# Patient Record
Sex: MALE | Race: WHITE | NOT HISPANIC OR LATINO
[De-identification: names, ages, dates, MRNs, and addresses within clinical notes are randomized per-mention and may not be internally consistent; named-entity substitution may affect disease eponyms.]

---

## 2017-01-09 ENCOUNTER — CHART COPY (OUTPATIENT)
Age: 82
End: 2017-01-09

## 2017-01-16 ENCOUNTER — APPOINTMENT (OUTPATIENT)
Dept: CARDIOLOGY | Facility: CLINIC | Age: 82
End: 2017-01-16

## 2017-01-16 VITALS — SYSTOLIC BLOOD PRESSURE: 121 MMHG | OXYGEN SATURATION: 93 % | HEART RATE: 68 BPM | DIASTOLIC BLOOD PRESSURE: 75 MMHG

## 2017-01-16 LAB
INR PPP: 2.6 RATIO
POCT-PROTHROMBIN TIME: 30.9 SECS
QUALITY CONTROL: YES

## 2017-01-19 ENCOUNTER — APPOINTMENT (OUTPATIENT)
Dept: FAMILY MEDICINE | Facility: CLINIC | Age: 82
End: 2017-01-19

## 2017-01-19 VITALS
RESPIRATION RATE: 16 BRPM | BODY MASS INDEX: 35.18 KG/M2 | HEART RATE: 68 BPM | TEMPERATURE: 97.8 F | DIASTOLIC BLOOD PRESSURE: 78 MMHG | OXYGEN SATURATION: 95 % | WEIGHT: 274 LBS | SYSTOLIC BLOOD PRESSURE: 130 MMHG

## 2017-02-06 ENCOUNTER — APPOINTMENT (OUTPATIENT)
Dept: CARDIOLOGY | Facility: CLINIC | Age: 82
End: 2017-02-06

## 2017-02-06 ENCOUNTER — OTHER (OUTPATIENT)
Age: 82
End: 2017-02-06

## 2017-02-06 VITALS
BODY MASS INDEX: 35.16 KG/M2 | WEIGHT: 274 LBS | SYSTOLIC BLOOD PRESSURE: 138 MMHG | HEIGHT: 74 IN | DIASTOLIC BLOOD PRESSURE: 80 MMHG | OXYGEN SATURATION: 99 % | HEART RATE: 75 BPM | RESPIRATION RATE: 17 BRPM

## 2017-02-06 VITALS
HEART RATE: 75 BPM | HEIGHT: 74 IN | WEIGHT: 274 LBS | TEMPERATURE: 97.8 F | DIASTOLIC BLOOD PRESSURE: 80 MMHG | BODY MASS INDEX: 35.16 KG/M2 | OXYGEN SATURATION: 99 % | SYSTOLIC BLOOD PRESSURE: 130 MMHG | RESPIRATION RATE: 17 BRPM

## 2017-02-06 LAB — INR PPP: 1.7 RATIO

## 2017-02-14 ENCOUNTER — APPOINTMENT (OUTPATIENT)
Dept: CARDIOLOGY | Facility: CLINIC | Age: 82
End: 2017-02-14

## 2017-02-14 VITALS — HEART RATE: 75 BPM | DIASTOLIC BLOOD PRESSURE: 70 MMHG | OXYGEN SATURATION: 95 % | SYSTOLIC BLOOD PRESSURE: 124 MMHG

## 2017-02-14 LAB
INR PPP: 2 RATIO
POCT-PROTHROMBIN TIME: 24.2 SECS
QUALITY CONTROL: YES

## 2017-02-15 ENCOUNTER — APPOINTMENT (OUTPATIENT)
Dept: CARDIOLOGY | Facility: CLINIC | Age: 82
End: 2017-02-15

## 2017-02-15 ENCOUNTER — NON-APPOINTMENT (OUTPATIENT)
Age: 82
End: 2017-02-15

## 2017-02-15 VITALS
OXYGEN SATURATION: 97 % | SYSTOLIC BLOOD PRESSURE: 127 MMHG | DIASTOLIC BLOOD PRESSURE: 75 MMHG | BODY MASS INDEX: 34.65 KG/M2 | RESPIRATION RATE: 17 BRPM | WEIGHT: 270 LBS | HEIGHT: 74 IN | HEART RATE: 60 BPM

## 2017-02-17 ENCOUNTER — APPOINTMENT (OUTPATIENT)
Dept: CARDIOLOGY | Facility: CLINIC | Age: 82
End: 2017-02-17

## 2017-02-28 ENCOUNTER — APPOINTMENT (OUTPATIENT)
Dept: CARDIOLOGY | Facility: CLINIC | Age: 82
End: 2017-02-28

## 2017-02-28 VITALS — OXYGEN SATURATION: 96 % | HEART RATE: 68 BPM | SYSTOLIC BLOOD PRESSURE: 123 MMHG | DIASTOLIC BLOOD PRESSURE: 75 MMHG

## 2017-02-28 LAB
INR PPP: 2.7 RATIO
POCT-PROTHROMBIN TIME: 32.3 SECS
QUALITY CONTROL: YES

## 2017-03-21 ENCOUNTER — APPOINTMENT (OUTPATIENT)
Dept: CARDIOLOGY | Facility: CLINIC | Age: 82
End: 2017-03-21

## 2017-03-21 VITALS — HEART RATE: 67 BPM | OXYGEN SATURATION: 97 % | DIASTOLIC BLOOD PRESSURE: 73 MMHG | SYSTOLIC BLOOD PRESSURE: 128 MMHG

## 2017-03-21 LAB
INR PPP: 2.8 RATIO
POCT-PROTHROMBIN TIME: 33.2 SECS
QUALITY CONTROL: YES

## 2017-04-07 ENCOUNTER — LABORATORY RESULT (OUTPATIENT)
Age: 82
End: 2017-04-07

## 2017-04-10 ENCOUNTER — APPOINTMENT (OUTPATIENT)
Dept: HEMATOLOGY ONCOLOGY | Facility: CLINIC | Age: 82
End: 2017-04-10

## 2017-04-10 VITALS
WEIGHT: 276 LBS | HEART RATE: 58 BPM | DIASTOLIC BLOOD PRESSURE: 56 MMHG | BODY MASS INDEX: 35.44 KG/M2 | SYSTOLIC BLOOD PRESSURE: 122 MMHG | TEMPERATURE: 97.6 F

## 2017-04-12 ENCOUNTER — APPOINTMENT (OUTPATIENT)
Dept: CARDIOLOGY | Facility: CLINIC | Age: 82
End: 2017-04-12

## 2017-04-20 ENCOUNTER — APPOINTMENT (OUTPATIENT)
Dept: CARDIOLOGY | Facility: CLINIC | Age: 82
End: 2017-04-20

## 2017-04-20 VITALS — OXYGEN SATURATION: 93 % | HEART RATE: 74 BPM

## 2017-04-20 LAB
INR PPP: 2.2 RATIO
POCT-PROTHROMBIN TIME: 25.9 SECS
QUALITY CONTROL: YES

## 2017-05-12 ENCOUNTER — APPOINTMENT (OUTPATIENT)
Dept: CARDIOLOGY | Facility: CLINIC | Age: 82
End: 2017-05-12

## 2017-05-12 VITALS
BODY MASS INDEX: 34.65 KG/M2 | HEART RATE: 60 BPM | DIASTOLIC BLOOD PRESSURE: 84 MMHG | SYSTOLIC BLOOD PRESSURE: 142 MMHG | OXYGEN SATURATION: 97 % | RESPIRATION RATE: 17 BRPM | WEIGHT: 270 LBS | HEIGHT: 74 IN

## 2017-05-12 LAB
INR PPP: 2.3 RATIO
POCT-PROTHROMBIN TIME: 27.2 SECS

## 2017-05-23 ENCOUNTER — NON-APPOINTMENT (OUTPATIENT)
Age: 82
End: 2017-05-23

## 2017-05-25 ENCOUNTER — RX RENEWAL (OUTPATIENT)
Age: 82
End: 2017-05-25

## 2017-05-25 ENCOUNTER — FORM ENCOUNTER (OUTPATIENT)
Age: 82
End: 2017-05-25

## 2017-05-26 ENCOUNTER — OUTPATIENT (OUTPATIENT)
Dept: OUTPATIENT SERVICES | Facility: HOSPITAL | Age: 82
LOS: 1 days | End: 2017-05-26
Payer: MEDICARE

## 2017-05-26 ENCOUNTER — APPOINTMENT (OUTPATIENT)
Dept: FAMILY MEDICINE | Facility: CLINIC | Age: 82
End: 2017-05-26

## 2017-05-26 ENCOUNTER — APPOINTMENT (OUTPATIENT)
Dept: RADIOLOGY | Facility: HOSPITAL | Age: 82
End: 2017-05-26

## 2017-05-26 VITALS
SYSTOLIC BLOOD PRESSURE: 130 MMHG | DIASTOLIC BLOOD PRESSURE: 80 MMHG | TEMPERATURE: 97 F | OXYGEN SATURATION: 98 % | WEIGHT: 274 LBS | RESPIRATION RATE: 16 BRPM | HEART RATE: 60 BPM | BODY MASS INDEX: 35.18 KG/M2

## 2017-05-26 DIAGNOSIS — R07.81 PLEURODYNIA: ICD-10-CM

## 2017-05-26 DIAGNOSIS — Z98.89 OTHER SPECIFIED POSTPROCEDURAL STATES: Chronic | ICD-10-CM

## 2017-05-26 DIAGNOSIS — Z47.1 AFTERCARE FOLLOWING JOINT REPLACEMENT SURGERY: ICD-10-CM

## 2017-05-26 DIAGNOSIS — S80.812A ABRASION, LEFT LOWER LEG, INITIAL ENCOUNTER: ICD-10-CM

## 2017-05-26 DIAGNOSIS — R07.89 OTHER CHEST PAIN: ICD-10-CM

## 2017-05-26 DIAGNOSIS — Z87.09 PERSONAL HISTORY OF OTHER DISEASES OF THE RESPIRATORY SYSTEM: ICD-10-CM

## 2017-05-26 DIAGNOSIS — M70.70 OTHER BURSITIS OF HIP, UNSPECIFIED HIP: ICD-10-CM

## 2017-05-26 DIAGNOSIS — R53.81 OTHER MALAISE: ICD-10-CM
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
## 2017-05-26 DIAGNOSIS — R53.83 OTHER MALAISE: ICD-10-CM

## 2017-05-26 PROCEDURE — 71101 X-RAY EXAM UNILAT RIBS/CHEST: CPT

## 2017-05-30 ENCOUNTER — APPOINTMENT (OUTPATIENT)
Dept: CARDIOLOGY | Facility: CLINIC | Age: 82
End: 2017-05-30

## 2017-05-30 VITALS — DIASTOLIC BLOOD PRESSURE: 82 MMHG | OXYGEN SATURATION: 94 % | SYSTOLIC BLOOD PRESSURE: 127 MMHG | HEART RATE: 77 BPM

## 2017-05-30 LAB
INR PPP: 2.3 RATIO
POCT-PROTHROMBIN TIME: 27.8 SECS
QUALITY CONTROL: YES

## 2017-05-31 ENCOUNTER — OUTPATIENT (OUTPATIENT)
Dept: OUTPATIENT SERVICES | Facility: HOSPITAL | Age: 82
LOS: 1 days | Discharge: HOME | End: 2017-05-31
Payer: MEDICARE

## 2017-05-31 ENCOUNTER — TRANSCRIPTION ENCOUNTER (OUTPATIENT)
Age: 82
End: 2017-05-31

## 2017-05-31 DIAGNOSIS — Z88.3 ALLERGY STATUS TO OTHER ANTI-INFECTIVE AGENTS: ICD-10-CM

## 2017-05-31 DIAGNOSIS — Z79.02 LONG TERM (CURRENT) USE OF ANTITHROMBOTICS/ANTIPLATELETS: ICD-10-CM

## 2017-05-31 DIAGNOSIS — G47.33 OBSTRUCTIVE SLEEP APNEA (ADULT) (PEDIATRIC): ICD-10-CM

## 2017-05-31 DIAGNOSIS — Z98.89 OTHER SPECIFIED POSTPROCEDURAL STATES: Chronic | ICD-10-CM

## 2017-05-31 DIAGNOSIS — I25.2 OLD MYOCARDIAL INFARCTION: ICD-10-CM

## 2017-05-31 DIAGNOSIS — H25.12 AGE-RELATED NUCLEAR CATARACT, LEFT EYE: ICD-10-CM

## 2017-05-31 DIAGNOSIS — Z95.810 PRESENCE OF AUTOMATIC (IMPLANTABLE) CARDIAC DEFIBRILLATOR: ICD-10-CM

## 2017-05-31 DIAGNOSIS — I25.10 ATHEROSCLEROTIC HEART DISEASE OF NATIVE CORONARY ARTERY WITHOUT ANGINA PECTORIS: ICD-10-CM

## 2017-05-31 DIAGNOSIS — I10 ESSENTIAL (PRIMARY) HYPERTENSION: ICD-10-CM

## 2017-05-31 PROCEDURE — C1780: CPT

## 2017-05-31 PROCEDURE — 66984 XCAPSL CTRC RMVL W/O ECP: CPT | Mod: LT

## 2017-06-15 ENCOUNTER — APPOINTMENT (OUTPATIENT)
Dept: FAMILY MEDICINE | Facility: CLINIC | Age: 82
End: 2017-06-15

## 2017-06-15 VITALS
HEART RATE: 60 BPM | SYSTOLIC BLOOD PRESSURE: 140 MMHG | OXYGEN SATURATION: 94 % | DIASTOLIC BLOOD PRESSURE: 80 MMHG | HEIGHT: 74 IN | RESPIRATION RATE: 16 BRPM | WEIGHT: 173 LBS | BODY MASS INDEX: 22.2 KG/M2 | TEMPERATURE: 97.8 F

## 2017-06-20 ENCOUNTER — APPOINTMENT (OUTPATIENT)
Dept: CARDIOLOGY | Facility: CLINIC | Age: 82
End: 2017-06-20

## 2017-06-20 VITALS — HEART RATE: 69 BPM | OXYGEN SATURATION: 93 % | DIASTOLIC BLOOD PRESSURE: 75 MMHG | SYSTOLIC BLOOD PRESSURE: 116 MMHG

## 2017-06-20 LAB
INR PPP: 1.8 RATIO
POCT-PROTHROMBIN TIME: 21.9 SECS
QUALITY CONTROL: YES

## 2017-06-21 ENCOUNTER — RX RENEWAL (OUTPATIENT)
Age: 82
End: 2017-06-21

## 2017-06-21 ENCOUNTER — APPOINTMENT (OUTPATIENT)
Dept: FAMILY MEDICINE | Facility: CLINIC | Age: 82
End: 2017-06-21

## 2017-06-21 VITALS
RESPIRATION RATE: 16 BRPM | SYSTOLIC BLOOD PRESSURE: 120 MMHG | HEIGHT: 74 IN | WEIGHT: 173 LBS | BODY MASS INDEX: 22.2 KG/M2 | DIASTOLIC BLOOD PRESSURE: 80 MMHG | HEART RATE: 60 BPM | OXYGEN SATURATION: 94 % | TEMPERATURE: 97.4 F

## 2017-06-21 RX ORDER — MOXIFLOXACIN HYDROCHLORIDE 5 MG/ML
0.5 SOLUTION/ DROPS OPHTHALMIC
Qty: 3 | Refills: 0 | Status: COMPLETED | COMMUNITY
Start: 2017-05-22

## 2017-06-21 RX ORDER — LINACLOTIDE 290 UG/1
290 CAPSULE, GELATIN COATED ORAL
Qty: 30 | Refills: 0 | Status: COMPLETED | COMMUNITY
Start: 2016-07-22

## 2017-06-26 ENCOUNTER — MEDICATION RENEWAL (OUTPATIENT)
Age: 82
End: 2017-06-26

## 2017-07-18 ENCOUNTER — APPOINTMENT (OUTPATIENT)
Dept: CARDIOLOGY | Facility: CLINIC | Age: 82
End: 2017-07-18

## 2017-07-20 ENCOUNTER — APPOINTMENT (OUTPATIENT)
Dept: CARDIOLOGY | Facility: CLINIC | Age: 82
End: 2017-07-20

## 2017-07-20 VITALS — SYSTOLIC BLOOD PRESSURE: 111 MMHG | HEART RATE: 59 BPM | OXYGEN SATURATION: 93 % | DIASTOLIC BLOOD PRESSURE: 74 MMHG

## 2017-08-03 ENCOUNTER — APPOINTMENT (OUTPATIENT)
Dept: CARDIOLOGY | Facility: CLINIC | Age: 82
End: 2017-08-03
Payer: MEDICARE

## 2017-08-03 VITALS — DIASTOLIC BLOOD PRESSURE: 74 MMHG | OXYGEN SATURATION: 95 % | HEART RATE: 62 BPM | SYSTOLIC BLOOD PRESSURE: 112 MMHG

## 2017-08-03 LAB
INR PPP: 2.5 RATIO
POCT-PROTHROMBIN TIME: 29.6 SECS
QUALITY CONTROL: YES

## 2017-08-03 PROCEDURE — 85610 PROTHROMBIN TIME: CPT | Mod: QW

## 2017-08-03 PROCEDURE — 99211 OFF/OP EST MAY X REQ PHY/QHP: CPT

## 2017-08-09 ENCOUNTER — APPOINTMENT (OUTPATIENT)
Dept: CARDIOLOGY | Facility: CLINIC | Age: 82
End: 2017-08-09
Payer: MEDICARE

## 2017-08-09 PROCEDURE — 93289 INTERROG DEVICE EVAL HEART: CPT

## 2017-08-11 ENCOUNTER — APPOINTMENT (OUTPATIENT)
Dept: CARDIOLOGY | Facility: CLINIC | Age: 82
End: 2017-08-11
Payer: MEDICARE

## 2017-08-11 ENCOUNTER — NON-APPOINTMENT (OUTPATIENT)
Age: 82
End: 2017-08-11

## 2017-08-11 VITALS
HEIGHT: 74 IN | RESPIRATION RATE: 16 BRPM | OXYGEN SATURATION: 93 % | WEIGHT: 273 LBS | BODY MASS INDEX: 35.04 KG/M2 | HEART RATE: 59 BPM | DIASTOLIC BLOOD PRESSURE: 71 MMHG | SYSTOLIC BLOOD PRESSURE: 112 MMHG

## 2017-08-11 PROCEDURE — 99214 OFFICE O/P EST MOD 30 MIN: CPT

## 2017-08-11 PROCEDURE — 93000 ELECTROCARDIOGRAM COMPLETE: CPT

## 2017-08-15 ENCOUNTER — LABORATORY RESULT (OUTPATIENT)
Age: 82
End: 2017-08-15

## 2017-08-16 ENCOUNTER — RX RENEWAL (OUTPATIENT)
Age: 82
End: 2017-08-16

## 2017-08-16 ENCOUNTER — MEDICATION RENEWAL (OUTPATIENT)
Age: 82
End: 2017-08-16

## 2017-08-18 ENCOUNTER — APPOINTMENT (OUTPATIENT)
Dept: HEMATOLOGY ONCOLOGY | Facility: CLINIC | Age: 82
End: 2017-08-18
Payer: MEDICARE

## 2017-08-18 VITALS
DIASTOLIC BLOOD PRESSURE: 60 MMHG | BODY MASS INDEX: 35.44 KG/M2 | WEIGHT: 276 LBS | TEMPERATURE: 98.1 F | HEART RATE: 60 BPM | SYSTOLIC BLOOD PRESSURE: 106 MMHG

## 2017-08-18 PROCEDURE — 99214 OFFICE O/P EST MOD 30 MIN: CPT

## 2017-08-18 RX ORDER — CEPHALEXIN 500 MG/1
500 CAPSULE ORAL 3 TIMES DAILY
Qty: 21 | Refills: 0 | Status: COMPLETED | COMMUNITY
Start: 2017-06-21 | End: 2017-08-18

## 2017-08-24 ENCOUNTER — APPOINTMENT (OUTPATIENT)
Dept: CARDIOLOGY | Facility: CLINIC | Age: 82
End: 2017-08-24
Payer: MEDICARE

## 2017-08-24 VITALS — RESPIRATION RATE: 97 BRPM | HEART RATE: 77 BPM

## 2017-08-24 LAB
INR PPP: 2.3 RATIO
POCT-PROTHROMBIN TIME: 27.7 SECS
QUALITY CONTROL: YES

## 2017-08-24 PROCEDURE — 99211 OFF/OP EST MAY X REQ PHY/QHP: CPT

## 2017-08-24 PROCEDURE — 85610 PROTHROMBIN TIME: CPT | Mod: QW

## 2017-08-29 ENCOUNTER — RX RENEWAL (OUTPATIENT)
Age: 82
End: 2017-08-29

## 2017-09-06 ENCOUNTER — RX RENEWAL (OUTPATIENT)
Age: 82
End: 2017-09-06

## 2017-09-11 ENCOUNTER — MEDICATION RENEWAL (OUTPATIENT)
Age: 82
End: 2017-09-11

## 2017-09-14 ENCOUNTER — APPOINTMENT (OUTPATIENT)
Dept: CARDIOLOGY | Facility: CLINIC | Age: 82
End: 2017-09-14
Payer: MEDICARE

## 2017-09-14 VITALS — DIASTOLIC BLOOD PRESSURE: 70 MMHG | HEART RATE: 80 BPM | SYSTOLIC BLOOD PRESSURE: 106 MMHG

## 2017-09-14 LAB
INR PPP: 2.5 RATIO
POCT-PROTHROMBIN TIME: 29.7 SECS
QUALITY CONTROL: YES

## 2017-09-14 PROCEDURE — 99211 OFF/OP EST MAY X REQ PHY/QHP: CPT

## 2017-09-14 PROCEDURE — 85610 PROTHROMBIN TIME: CPT | Mod: QW

## 2017-09-25 ENCOUNTER — RX RENEWAL (OUTPATIENT)
Age: 82
End: 2017-09-25

## 2017-10-10 ENCOUNTER — APPOINTMENT (OUTPATIENT)
Dept: CARDIOLOGY | Facility: CLINIC | Age: 82
End: 2017-10-10
Payer: MEDICARE

## 2017-10-10 VITALS — HEART RATE: 74 BPM | OXYGEN SATURATION: 93 % | SYSTOLIC BLOOD PRESSURE: 116 MMHG | DIASTOLIC BLOOD PRESSURE: 73 MMHG

## 2017-10-10 LAB
INR PPP: 1.7 RATIO
POCT-PROTHROMBIN TIME: 20.7 SECS
QUALITY CONTROL: YES

## 2017-10-10 PROCEDURE — 85610 PROTHROMBIN TIME: CPT | Mod: QW

## 2017-10-10 PROCEDURE — 99211 OFF/OP EST MAY X REQ PHY/QHP: CPT

## 2017-10-17 ENCOUNTER — RX RENEWAL (OUTPATIENT)
Age: 82
End: 2017-10-17

## 2017-10-26 ENCOUNTER — APPOINTMENT (OUTPATIENT)
Dept: CARDIOLOGY | Facility: CLINIC | Age: 82
End: 2017-10-26
Payer: MEDICARE

## 2017-10-26 VITALS — OXYGEN SATURATION: 94 % | TEMPERATURE: 97.9 F | HEART RATE: 77 BPM

## 2017-10-26 PROCEDURE — 99211 OFF/OP EST MAY X REQ PHY/QHP: CPT

## 2017-10-26 PROCEDURE — 85610 PROTHROMBIN TIME: CPT | Mod: QW

## 2017-11-06 ENCOUNTER — APPOINTMENT (OUTPATIENT)
Dept: FAMILY MEDICINE | Facility: CLINIC | Age: 82
End: 2017-11-06
Payer: MEDICARE

## 2017-11-06 VITALS
HEART RATE: 76 BPM | HEIGHT: 74 IN | BODY MASS INDEX: 34.91 KG/M2 | TEMPERATURE: 98 F | WEIGHT: 272 LBS | OXYGEN SATURATION: 94 % | SYSTOLIC BLOOD PRESSURE: 112 MMHG | DIASTOLIC BLOOD PRESSURE: 60 MMHG | RESPIRATION RATE: 14 BRPM

## 2017-11-06 PROCEDURE — 99213 OFFICE O/P EST LOW 20 MIN: CPT

## 2017-11-10 ENCOUNTER — APPOINTMENT (OUTPATIENT)
Dept: FAMILY MEDICINE | Facility: CLINIC | Age: 82
End: 2017-11-10

## 2017-11-14 ENCOUNTER — APPOINTMENT (OUTPATIENT)
Dept: CARDIOLOGY | Facility: CLINIC | Age: 82
End: 2017-11-14
Payer: MEDICARE

## 2017-11-14 VITALS — HEART RATE: 52 BPM | SYSTOLIC BLOOD PRESSURE: 120 MMHG | DIASTOLIC BLOOD PRESSURE: 81 MMHG

## 2017-11-14 LAB
INR PPP: 2.2 RATIO
POCT-PROTHROMBIN TIME: 26.3 SECS
QUALITY CONTROL: YES

## 2017-11-14 PROCEDURE — 99211 OFF/OP EST MAY X REQ PHY/QHP: CPT

## 2017-11-14 PROCEDURE — 85610 PROTHROMBIN TIME: CPT | Mod: QW

## 2017-11-15 ENCOUNTER — APPOINTMENT (OUTPATIENT)
Dept: FAMILY MEDICINE | Facility: CLINIC | Age: 82
End: 2017-11-15
Payer: MEDICARE

## 2017-11-15 VITALS — TEMPERATURE: 98.4 F

## 2017-11-15 PROCEDURE — G0008: CPT

## 2017-11-15 PROCEDURE — 90686 IIV4 VACC NO PRSV 0.5 ML IM: CPT

## 2017-12-13 ENCOUNTER — APPOINTMENT (OUTPATIENT)
Dept: CARDIOLOGY | Facility: CLINIC | Age: 82
End: 2017-12-13
Payer: MEDICARE

## 2017-12-13 VITALS — HEART RATE: 62 BPM | OXYGEN SATURATION: 90 % | SYSTOLIC BLOOD PRESSURE: 112 MMHG | DIASTOLIC BLOOD PRESSURE: 69 MMHG

## 2017-12-13 LAB
INR PPP: 2.5 RATIO
POCT-PROTHROMBIN TIME: 29.6 SECS
QUALITY CONTROL: YES

## 2017-12-13 PROCEDURE — 93289 INTERROG DEVICE EVAL HEART: CPT

## 2017-12-13 PROCEDURE — 85610 PROTHROMBIN TIME: CPT | Mod: QW

## 2017-12-13 PROCEDURE — 99211 OFF/OP EST MAY X REQ PHY/QHP: CPT | Mod: 25

## 2017-12-18 ENCOUNTER — RX RENEWAL (OUTPATIENT)
Age: 82
End: 2017-12-18

## 2017-12-24 ENCOUNTER — MOBILE ON CALL (OUTPATIENT)
Age: 82
End: 2017-12-24

## 2017-12-24 ENCOUNTER — EMERGENCY (EMERGENCY)
Facility: HOSPITAL | Age: 82
LOS: 1 days | Discharge: HOME | End: 2017-12-24
Admitting: EMERGENCY MEDICINE
Payer: MEDICARE

## 2017-12-24 DIAGNOSIS — K21.9 GASTRO-ESOPHAGEAL REFLUX DISEASE WITHOUT ESOPHAGITIS: ICD-10-CM

## 2017-12-24 DIAGNOSIS — Z79.02 LONG TERM (CURRENT) USE OF ANTITHROMBOTICS/ANTIPLATELETS: ICD-10-CM

## 2017-12-24 DIAGNOSIS — E78.5 HYPERLIPIDEMIA, UNSPECIFIED: ICD-10-CM

## 2017-12-24 DIAGNOSIS — Z79.01 LONG TERM (CURRENT) USE OF ANTICOAGULANTS: ICD-10-CM

## 2017-12-24 DIAGNOSIS — I25.10 ATHEROSCLEROTIC HEART DISEASE OF NATIVE CORONARY ARTERY WITHOUT ANGINA PECTORIS: ICD-10-CM

## 2017-12-24 DIAGNOSIS — Z98.89 OTHER SPECIFIED POSTPROCEDURAL STATES: Chronic | ICD-10-CM

## 2017-12-24 DIAGNOSIS — J06.9 ACUTE UPPER RESPIRATORY INFECTION, UNSPECIFIED: ICD-10-CM

## 2017-12-24 DIAGNOSIS — Z95.5 PRESENCE OF CORONARY ANGIOPLASTY IMPLANT AND GRAFT: ICD-10-CM

## 2017-12-24 DIAGNOSIS — B97.89 OTHER VIRAL AGENTS AS THE CAUSE OF DISEASES CLASSIFIED ELSEWHERE: ICD-10-CM

## 2017-12-24 DIAGNOSIS — Z79.899 OTHER LONG TERM (CURRENT) DRUG THERAPY: ICD-10-CM

## 2017-12-24 DIAGNOSIS — I50.9 HEART FAILURE, UNSPECIFIED: ICD-10-CM

## 2017-12-24 DIAGNOSIS — I11.0 HYPERTENSIVE HEART DISEASE WITH HEART FAILURE: ICD-10-CM

## 2017-12-24 DIAGNOSIS — I48.91 UNSPECIFIED ATRIAL FIBRILLATION: ICD-10-CM

## 2017-12-24 PROCEDURE — 71046 X-RAY EXAM CHEST 2 VIEWS: CPT

## 2017-12-24 PROCEDURE — 85027 COMPLETE CBC AUTOMATED: CPT

## 2017-12-24 PROCEDURE — 99284 EMERGENCY DEPT VISIT MOD MDM: CPT

## 2017-12-24 PROCEDURE — 71020: CPT | Mod: 26

## 2017-12-24 PROCEDURE — 99283 EMERGENCY DEPT VISIT LOW MDM: CPT | Mod: 25

## 2017-12-24 PROCEDURE — 80048 BASIC METABOLIC PNL TOTAL CA: CPT

## 2017-12-26 ENCOUNTER — APPOINTMENT (OUTPATIENT)
Dept: FAMILY MEDICINE | Facility: CLINIC | Age: 82
End: 2017-12-26
Payer: MEDICARE

## 2017-12-26 VITALS
HEART RATE: 64 BPM | OXYGEN SATURATION: 95 % | BODY MASS INDEX: 34.67 KG/M2 | TEMPERATURE: 98 F | WEIGHT: 270 LBS | SYSTOLIC BLOOD PRESSURE: 118 MMHG | DIASTOLIC BLOOD PRESSURE: 60 MMHG | RESPIRATION RATE: 16 BRPM

## 2017-12-26 PROCEDURE — 99214 OFFICE O/P EST MOD 30 MIN: CPT

## 2017-12-28 ENCOUNTER — RX RENEWAL (OUTPATIENT)
Age: 82
End: 2017-12-28

## 2018-01-08 ENCOUNTER — APPOINTMENT (OUTPATIENT)
Dept: FAMILY MEDICINE | Facility: CLINIC | Age: 83
End: 2018-01-08
Payer: MEDICARE

## 2018-01-08 ENCOUNTER — OUTPATIENT (OUTPATIENT)
Dept: OUTPATIENT SERVICES | Facility: HOSPITAL | Age: 83
LOS: 1 days | End: 2018-01-08
Payer: MEDICARE

## 2018-01-08 VITALS
DIASTOLIC BLOOD PRESSURE: 70 MMHG | TEMPERATURE: 98 F | SYSTOLIC BLOOD PRESSURE: 100 MMHG | RESPIRATION RATE: 14 BRPM | BODY MASS INDEX: 35.29 KG/M2 | WEIGHT: 275 LBS | HEIGHT: 74 IN | OXYGEN SATURATION: 98 % | HEART RATE: 60 BPM

## 2018-01-08 DIAGNOSIS — Z98.89 OTHER SPECIFIED POSTPROCEDURAL STATES: Chronic | ICD-10-CM

## 2018-01-08 DIAGNOSIS — R60.9 EDEMA, UNSPECIFIED: ICD-10-CM

## 2018-01-08 PROCEDURE — 93970 EXTREMITY STUDY: CPT | Mod: 26

## 2018-01-08 PROCEDURE — 99214 OFFICE O/P EST MOD 30 MIN: CPT

## 2018-01-08 PROCEDURE — 93970 EXTREMITY STUDY: CPT

## 2018-01-11 ENCOUNTER — APPOINTMENT (OUTPATIENT)
Dept: CARDIOLOGY | Facility: CLINIC | Age: 83
End: 2018-01-11
Payer: MEDICARE

## 2018-01-11 VITALS — SYSTOLIC BLOOD PRESSURE: 98 MMHG | HEART RATE: 71 BPM | OXYGEN SATURATION: 93 % | DIASTOLIC BLOOD PRESSURE: 61 MMHG

## 2018-01-11 LAB
INR PPP: 2.3 RATIO
POCT-PROTHROMBIN TIME: 27.4 SECS
QUALITY CONTROL: YES

## 2018-01-11 PROCEDURE — 85610 PROTHROMBIN TIME: CPT | Mod: QW

## 2018-01-11 PROCEDURE — 99211 OFF/OP EST MAY X REQ PHY/QHP: CPT

## 2018-01-24 ENCOUNTER — APPOINTMENT (OUTPATIENT)
Dept: CARDIOLOGY | Facility: CLINIC | Age: 83
End: 2018-01-24
Payer: MEDICARE

## 2018-01-24 ENCOUNTER — NON-APPOINTMENT (OUTPATIENT)
Age: 83
End: 2018-01-24

## 2018-01-24 VITALS
SYSTOLIC BLOOD PRESSURE: 124 MMHG | OXYGEN SATURATION: 97 % | BODY MASS INDEX: 35.29 KG/M2 | RESPIRATION RATE: 16 BRPM | HEIGHT: 74 IN | WEIGHT: 275 LBS | DIASTOLIC BLOOD PRESSURE: 76 MMHG | HEART RATE: 60 BPM

## 2018-01-24 PROCEDURE — 85610 PROTHROMBIN TIME: CPT | Mod: QW

## 2018-01-24 PROCEDURE — 93000 ELECTROCARDIOGRAM COMPLETE: CPT

## 2018-01-24 PROCEDURE — 99215 OFFICE O/P EST HI 40 MIN: CPT

## 2018-01-25 LAB
INR PPP: 1.7 RATIO
POCT-PROTHROMBIN TIME: 20.1 SECS
QUALITY CONTROL: YES

## 2018-02-12 ENCOUNTER — LABORATORY RESULT (OUTPATIENT)
Age: 83
End: 2018-02-12

## 2018-02-16 ENCOUNTER — APPOINTMENT (OUTPATIENT)
Dept: FAMILY MEDICINE | Facility: CLINIC | Age: 83
End: 2018-02-16
Payer: MEDICARE

## 2018-02-16 ENCOUNTER — APPOINTMENT (OUTPATIENT)
Dept: HEMATOLOGY ONCOLOGY | Facility: CLINIC | Age: 83
End: 2018-02-16
Payer: MEDICARE

## 2018-02-16 VITALS
BODY MASS INDEX: 35.69 KG/M2 | HEART RATE: 60 BPM | TEMPERATURE: 97.6 F | OXYGEN SATURATION: 94 % | WEIGHT: 278 LBS | DIASTOLIC BLOOD PRESSURE: 54 MMHG | SYSTOLIC BLOOD PRESSURE: 100 MMHG

## 2018-02-16 VITALS
HEART RATE: 60 BPM | TEMPERATURE: 97.8 F | SYSTOLIC BLOOD PRESSURE: 118 MMHG | RESPIRATION RATE: 16 BRPM | DIASTOLIC BLOOD PRESSURE: 80 MMHG | OXYGEN SATURATION: 96 %

## 2018-02-16 VITALS — SYSTOLIC BLOOD PRESSURE: 116 MMHG | DIASTOLIC BLOOD PRESSURE: 76 MMHG

## 2018-02-16 VITALS — SYSTOLIC BLOOD PRESSURE: 96 MMHG | DIASTOLIC BLOOD PRESSURE: 54 MMHG

## 2018-02-16 DIAGNOSIS — I87.2 VENOUS INSUFFICIENCY (CHRONIC) (PERIPHERAL): ICD-10-CM

## 2018-02-16 PROCEDURE — 99214 OFFICE O/P EST MOD 30 MIN: CPT

## 2018-02-16 RX ORDER — TOBRAMYCIN 3 MG/ML
0.3 SOLUTION/ DROPS OPHTHALMIC 4 TIMES DAILY
Qty: 1 | Refills: 0 | Status: COMPLETED | COMMUNITY
Start: 2017-12-26 | End: 2018-02-16

## 2018-02-16 RX ORDER — POTASSIUM CHLORIDE 750 MG/1
10 TABLET, FILM COATED, EXTENDED RELEASE ORAL DAILY
Qty: 180 | Refills: 0 | Status: COMPLETED | COMMUNITY
Start: 2017-08-16 | End: 2018-02-16

## 2018-02-16 RX ORDER — AZITHROMYCIN 250 MG/1
250 TABLET, FILM COATED ORAL
Qty: 1 | Refills: 0 | Status: COMPLETED | COMMUNITY
Start: 2017-11-06 | End: 2018-02-16

## 2018-02-16 RX ORDER — BENZONATATE 100 MG/1
100 CAPSULE ORAL
Qty: 12 | Refills: 0 | Status: COMPLETED | COMMUNITY
Start: 2017-12-24

## 2018-02-16 RX ORDER — LEVOFLOXACIN 500 MG/1
500 TABLET, FILM COATED ORAL DAILY
Qty: 7 | Refills: 0 | Status: COMPLETED | COMMUNITY
Start: 2017-12-28 | End: 2018-02-16

## 2018-02-16 RX ORDER — OXYMETAZOLINE HYDROCHLORIDE 0.05 G/100ML
0.05 SPRAY NASAL
Qty: 30 | Refills: 0 | Status: COMPLETED | COMMUNITY
Start: 2017-12-24

## 2018-02-16 RX ORDER — GUAIFENESIN 600 MG/1
600 TABLET, EXTENDED RELEASE ORAL
Qty: 10 | Refills: 0 | Status: COMPLETED | COMMUNITY
Start: 2017-12-24

## 2018-02-20 ENCOUNTER — APPOINTMENT (OUTPATIENT)
Dept: CARDIOLOGY | Facility: CLINIC | Age: 83
End: 2018-02-20
Payer: MEDICARE

## 2018-02-20 VITALS — HEART RATE: 60 BPM | OXYGEN SATURATION: 95 % | DIASTOLIC BLOOD PRESSURE: 89 MMHG | SYSTOLIC BLOOD PRESSURE: 143 MMHG

## 2018-02-20 LAB
INR PPP: 2.8 RATIO
POCT-PROTHROMBIN TIME: 33.9 SECS
QUALITY CONTROL: YES

## 2018-02-20 PROCEDURE — 99211 OFF/OP EST MAY X REQ PHY/QHP: CPT

## 2018-02-20 PROCEDURE — 85610 PROTHROMBIN TIME: CPT | Mod: QW

## 2018-03-09 ENCOUNTER — LABORATORY RESULT (OUTPATIENT)
Age: 83
End: 2018-03-09

## 2018-03-12 ENCOUNTER — RX RENEWAL (OUTPATIENT)
Age: 83
End: 2018-03-12

## 2018-03-14 ENCOUNTER — APPOINTMENT (OUTPATIENT)
Dept: CARDIOLOGY | Facility: CLINIC | Age: 83
End: 2018-03-14
Payer: MEDICARE

## 2018-03-14 PROCEDURE — 99214 OFFICE O/P EST MOD 30 MIN: CPT

## 2018-03-14 PROCEDURE — 93000 ELECTROCARDIOGRAM COMPLETE: CPT

## 2018-03-14 PROCEDURE — 85610 PROTHROMBIN TIME: CPT | Mod: QW

## 2018-03-14 PROCEDURE — 93306 TTE W/DOPPLER COMPLETE: CPT

## 2018-03-15 ENCOUNTER — APPOINTMENT (OUTPATIENT)
Dept: FAMILY MEDICINE | Facility: CLINIC | Age: 83
End: 2018-03-15
Payer: MEDICARE

## 2018-03-15 VITALS
OXYGEN SATURATION: 96 % | BODY MASS INDEX: 35.68 KG/M2 | WEIGHT: 278 LBS | TEMPERATURE: 97 F | SYSTOLIC BLOOD PRESSURE: 120 MMHG | HEART RATE: 60 BPM | DIASTOLIC BLOOD PRESSURE: 70 MMHG | HEIGHT: 74 IN | RESPIRATION RATE: 15 BRPM

## 2018-03-15 DIAGNOSIS — T14.8XXA OTHER INJURY OF UNSPECIFIED BODY REGION, INITIAL ENCOUNTER: ICD-10-CM

## 2018-03-15 DIAGNOSIS — Z86.69 PERSONAL HISTORY OF OTHER DISEASES OF THE NERVOUS SYSTEM AND SENSE ORGANS: ICD-10-CM

## 2018-03-15 LAB
INR PPP: 2.1 RATIO
POCT-PROTHROMBIN TIME: 25.1 SECS
QUALITY CONTROL: YES

## 2018-03-15 PROCEDURE — 99214 OFFICE O/P EST MOD 30 MIN: CPT

## 2018-03-29 ENCOUNTER — APPOINTMENT (OUTPATIENT)
Dept: CARDIOLOGY | Facility: CLINIC | Age: 83
End: 2018-03-29
Payer: MEDICARE

## 2018-03-29 PROCEDURE — A9500: CPT

## 2018-03-29 PROCEDURE — 78452 HT MUSCLE IMAGE SPECT MULT: CPT

## 2018-03-29 PROCEDURE — 93015 CV STRESS TEST SUPVJ I&R: CPT

## 2018-04-03 ENCOUNTER — APPOINTMENT (OUTPATIENT)
Dept: CARDIOLOGY | Facility: CLINIC | Age: 83
End: 2018-04-03
Payer: MEDICARE

## 2018-04-03 PROCEDURE — ZZZZZ: CPT

## 2018-04-11 ENCOUNTER — APPOINTMENT (OUTPATIENT)
Dept: CARDIOLOGY | Facility: CLINIC | Age: 83
End: 2018-04-11
Payer: MEDICARE

## 2018-04-11 LAB
INR PPP: 2.3 RATIO
POCT-PROTHROMBIN TIME: 27.1 SECS
QUALITY CONTROL: YES

## 2018-04-11 PROCEDURE — 85610 PROTHROMBIN TIME: CPT | Mod: QW

## 2018-04-11 PROCEDURE — 93289 INTERROG DEVICE EVAL HEART: CPT

## 2018-04-11 PROCEDURE — 99211 OFF/OP EST MAY X REQ PHY/QHP: CPT | Mod: 25

## 2018-04-11 PROCEDURE — 93000 ELECTROCARDIOGRAM COMPLETE: CPT | Mod: 59

## 2018-04-11 PROCEDURE — 99214 OFFICE O/P EST MOD 30 MIN: CPT

## 2018-04-20 ENCOUNTER — APPOINTMENT (OUTPATIENT)
Dept: CT IMAGING | Facility: HOSPITAL | Age: 83
End: 2018-04-20
Payer: MEDICARE

## 2018-04-20 ENCOUNTER — OUTPATIENT (OUTPATIENT)
Dept: OUTPATIENT SERVICES | Facility: HOSPITAL | Age: 83
LOS: 1 days | End: 2018-04-20
Payer: MEDICARE

## 2018-04-20 DIAGNOSIS — Z98.89 OTHER SPECIFIED POSTPROCEDURAL STATES: Chronic | ICD-10-CM

## 2018-04-20 DIAGNOSIS — R10.13 EPIGASTRIC PAIN: ICD-10-CM

## 2018-04-20 DIAGNOSIS — N28.1 CYST OF KIDNEY, ACQUIRED: ICD-10-CM

## 2018-04-20 DIAGNOSIS — Z00.8 ENCOUNTER FOR OTHER GENERAL EXAMINATION: ICD-10-CM

## 2018-04-20 PROCEDURE — 74177 CT ABD & PELVIS W/CONTRAST: CPT

## 2018-04-20 PROCEDURE — 74177 CT ABD & PELVIS W/CONTRAST: CPT | Mod: 26

## 2018-04-25 ENCOUNTER — APPOINTMENT (OUTPATIENT)
Dept: CARDIOLOGY | Facility: CLINIC | Age: 83
End: 2018-04-25
Payer: MEDICARE

## 2018-04-25 ENCOUNTER — NON-APPOINTMENT (OUTPATIENT)
Age: 83
End: 2018-04-25

## 2018-04-25 VITALS
BODY MASS INDEX: 35.94 KG/M2 | RESPIRATION RATE: 15 BRPM | SYSTOLIC BLOOD PRESSURE: 112 MMHG | HEART RATE: 70 BPM | HEIGHT: 74 IN | DIASTOLIC BLOOD PRESSURE: 71 MMHG | OXYGEN SATURATION: 95 % | WEIGHT: 280 LBS

## 2018-04-25 PROCEDURE — 93000 ELECTROCARDIOGRAM COMPLETE: CPT

## 2018-04-25 PROCEDURE — 99215 OFFICE O/P EST HI 40 MIN: CPT

## 2018-04-28 ENCOUNTER — RX RENEWAL (OUTPATIENT)
Age: 83
End: 2018-04-28

## 2018-05-07 ENCOUNTER — APPOINTMENT (OUTPATIENT)
Dept: CARDIOLOGY | Facility: CLINIC | Age: 83
End: 2018-05-07
Payer: MEDICARE

## 2018-05-07 ENCOUNTER — APPOINTMENT (OUTPATIENT)
Dept: CARDIOLOGY | Facility: CLINIC | Age: 83
End: 2018-05-07

## 2018-05-07 LAB
INR PPP: 2.3 RATIO
POCT-PROTHROMBIN TIME: 28 SECS
QUALITY CONTROL: YES

## 2018-05-07 PROCEDURE — 85610 PROTHROMBIN TIME: CPT | Mod: QW

## 2018-05-07 PROCEDURE — 99211 OFF/OP EST MAY X REQ PHY/QHP: CPT

## 2018-05-21 LAB
INR PPP: 1.96 RATIO
PT BLD: 22.5 SEC

## 2018-06-01 ENCOUNTER — RX RENEWAL (OUTPATIENT)
Age: 83
End: 2018-06-01

## 2018-06-04 ENCOUNTER — APPOINTMENT (OUTPATIENT)
Dept: CARDIOLOGY | Facility: CLINIC | Age: 83
End: 2018-06-04
Payer: MEDICARE

## 2018-06-04 VITALS — HEART RATE: 67 BPM | OXYGEN SATURATION: 93 %

## 2018-06-04 LAB
INR PPP: 2.7 RATIO
POCT-PROTHROMBIN TIME: 31.9 SECS
QUALITY CONTROL: YES

## 2018-06-04 PROCEDURE — 85610 PROTHROMBIN TIME: CPT | Mod: QW

## 2018-06-04 PROCEDURE — 99211 OFF/OP EST MAY X REQ PHY/QHP: CPT

## 2018-06-07 ENCOUNTER — RX RENEWAL (OUTPATIENT)
Age: 83
End: 2018-06-07

## 2018-06-11 ENCOUNTER — LABORATORY RESULT (OUTPATIENT)
Age: 83
End: 2018-06-11

## 2018-06-14 ENCOUNTER — APPOINTMENT (OUTPATIENT)
Dept: HEMATOLOGY ONCOLOGY | Facility: CLINIC | Age: 83
End: 2018-06-14
Payer: MEDICARE

## 2018-06-14 VITALS
WEIGHT: 282 LBS | SYSTOLIC BLOOD PRESSURE: 108 MMHG | TEMPERATURE: 97.2 F | BODY MASS INDEX: 36.21 KG/M2 | DIASTOLIC BLOOD PRESSURE: 64 MMHG | HEART RATE: 72 BPM

## 2018-06-14 PROCEDURE — 99214 OFFICE O/P EST MOD 30 MIN: CPT

## 2018-06-14 RX ORDER — BENZONATATE 200 MG/1
200 CAPSULE ORAL
Qty: 15 | Refills: 0 | Status: COMPLETED | COMMUNITY
Start: 2017-12-27 | End: 2018-06-14

## 2018-06-14 RX ORDER — DIFLUPREDNATE 0.5 MG/ML
0.05 EMULSION OPHTHALMIC
Qty: 5 | Refills: 0 | Status: COMPLETED | COMMUNITY
Start: 2017-05-22 | End: 2018-06-14

## 2018-06-15 ENCOUNTER — APPOINTMENT (OUTPATIENT)
Dept: FAMILY MEDICINE | Facility: CLINIC | Age: 83
End: 2018-06-15
Payer: MEDICARE

## 2018-06-15 VITALS
TEMPERATURE: 97.2 F | OXYGEN SATURATION: 95 % | BODY MASS INDEX: 36.32 KG/M2 | WEIGHT: 283 LBS | DIASTOLIC BLOOD PRESSURE: 60 MMHG | HEIGHT: 74 IN | RESPIRATION RATE: 15 BRPM | SYSTOLIC BLOOD PRESSURE: 124 MMHG | HEART RATE: 60 BPM

## 2018-06-15 VITALS — DIASTOLIC BLOOD PRESSURE: 60 MMHG | SYSTOLIC BLOOD PRESSURE: 110 MMHG

## 2018-06-15 DIAGNOSIS — Z23 ENCOUNTER FOR IMMUNIZATION: ICD-10-CM

## 2018-06-15 DIAGNOSIS — J06.9 ACUTE UPPER RESPIRATORY INFECTION, UNSPECIFIED: ICD-10-CM

## 2018-06-15 DIAGNOSIS — R07.81 PLEURODYNIA: ICD-10-CM

## 2018-06-15 PROCEDURE — 99214 OFFICE O/P EST MOD 30 MIN: CPT

## 2018-06-15 NOTE — HEALTH RISK ASSESSMENT
[] : No [No falls in past year] : Patient reported no falls in the past year [0] : 2) Feeling down, depressed, or hopeless: Not at all (0) [[id number]] : 0

## 2018-06-15 NOTE — REVIEW OF SYSTEMS
[Shortness Of Breath] : no shortness of breath [Wheezing] : no wheezing [Cough] : no cough [Dyspnea on Exertion] : dyspnea on exertion [Joint Pain] : no joint pain [Joint Stiffness] : no joint stiffness [Muscle Pain] : no muscle pain [Muscle Weakness] : no muscle weakness [Back Pain] : back pain [Joint Swelling] : joint swelling [Negative] : Heme/Lymph [FreeTextEntry9] : leg swelling

## 2018-06-15 NOTE — COUNSELING
[Weight management counseling provided] : Weight management [Healthy eating counseling provided] : healthy eating [Activity counseling provided] : activity [Decrease Portions] : Decrease food portions [Walking] : Walking [None] : None [Good understanding] : Patient has a good understanding of lifestyle changes and the steps needed to achieve self management goals

## 2018-06-15 NOTE — PHYSICAL EXAM
[No Acute Distress] : no acute distress [Well-Appearing] : well-appearing [Clear to Auscultation] : lungs were clear to auscultation bilaterally [No Accessory Muscle Use] : no accessory muscle use [Normal S1, S2] : normal S1 and S2 [Soft] : abdomen soft [Non-distended] : non-distended [de-identified] : in NAD [de-identified] : ventral diaphtheisi rectus muscles  [de-identified] : 2+ leg edema bilaterally

## 2018-06-15 NOTE — HISTORY OF PRESENT ILLNESS
[FreeTextEntry1] : f/u [de-identified] : 82 y/o here for f/u PMHX CHF pancytopenia. Fib on OAC, pacer in place recent eval cardio and Hem Onc. Pt c/o low back pain and legs swelling worse when in computer for over 1 1/2 hrs as well right hand middle finger get trapped and at times get pain. Pt no CP SOB palpitations or dizziness now here but states when walks 1/2 block gets SOB and when tried to get out chair in hematology office felt a bit off and had to sit down again. apt at that times notes he tried to move too quickly. pt did not experienced additional symptoms after

## 2018-06-26 ENCOUNTER — LABORATORY RESULT (OUTPATIENT)
Age: 83
End: 2018-06-26

## 2018-06-26 LAB
CHOLEST SERPL-MCNC: 143 MG/DL
CHOLEST/HDLC SERPL: 3.3 RATIO
HBA1C MFR BLD HPLC: 5.9 %
HDLC SERPL-MCNC: 43 MG/DL
LDLC SERPL CALC-MCNC: 78 MG/DL
TRIGL SERPL-MCNC: 111 MG/DL

## 2018-06-27 ENCOUNTER — APPOINTMENT (OUTPATIENT)
Dept: CARDIOLOGY | Facility: CLINIC | Age: 83
End: 2018-06-27
Payer: MEDICARE

## 2018-06-27 ENCOUNTER — NON-APPOINTMENT (OUTPATIENT)
Age: 83
End: 2018-06-27

## 2018-06-27 VITALS
SYSTOLIC BLOOD PRESSURE: 120 MMHG | HEIGHT: 74 IN | HEART RATE: 70 BPM | BODY MASS INDEX: 35.94 KG/M2 | DIASTOLIC BLOOD PRESSURE: 73 MMHG | WEIGHT: 280 LBS | OXYGEN SATURATION: 96 % | RESPIRATION RATE: 15 BRPM

## 2018-06-27 LAB — TSH SERPL-ACNC: 8.28 UIU/ML

## 2018-06-27 PROCEDURE — 93000 ELECTROCARDIOGRAM COMPLETE: CPT

## 2018-06-27 PROCEDURE — 99213 OFFICE O/P EST LOW 20 MIN: CPT

## 2018-07-02 ENCOUNTER — APPOINTMENT (OUTPATIENT)
Dept: CARDIOLOGY | Facility: CLINIC | Age: 83
End: 2018-07-02
Payer: MEDICARE

## 2018-07-02 VITALS — OXYGEN SATURATION: 94 % | HEART RATE: 67 BPM

## 2018-07-02 LAB
INR PPP: 3.2 RATIO
POCT-PROTHROMBIN TIME: 38.7 SECS
QUALITY CONTROL: YES

## 2018-07-02 PROCEDURE — 93793 ANTICOAG MGMT PT WARFARIN: CPT

## 2018-07-02 PROCEDURE — 85610 PROTHROMBIN TIME: CPT | Mod: QW

## 2018-07-11 ENCOUNTER — APPOINTMENT (OUTPATIENT)
Dept: CARDIOLOGY | Facility: CLINIC | Age: 83
End: 2018-07-11
Payer: MEDICARE

## 2018-07-11 ENCOUNTER — APPOINTMENT (OUTPATIENT)
Dept: CARDIOLOGY | Facility: CLINIC | Age: 83
End: 2018-07-11

## 2018-07-11 PROCEDURE — 93289 INTERROG DEVICE EVAL HEART: CPT

## 2018-07-23 ENCOUNTER — APPOINTMENT (OUTPATIENT)
Dept: CARDIOLOGY | Facility: CLINIC | Age: 83
End: 2018-07-23
Payer: MEDICARE

## 2018-07-23 VITALS — HEART RATE: 77 BPM | OXYGEN SATURATION: 96 %

## 2018-07-23 PROCEDURE — 93793 ANTICOAG MGMT PT WARFARIN: CPT

## 2018-07-23 PROCEDURE — 85610 PROTHROMBIN TIME: CPT | Mod: QW

## 2018-07-26 LAB
INR PPP: 2.6 RATIO
POCT-PROTHROMBIN TIME: 30.8 SECS
QUALITY CONTROL: YES

## 2018-08-02 ENCOUNTER — LABORATORY RESULT (OUTPATIENT)
Age: 83
End: 2018-08-02

## 2018-08-20 ENCOUNTER — APPOINTMENT (OUTPATIENT)
Dept: CARDIOLOGY | Facility: CLINIC | Age: 83
End: 2018-08-20
Payer: MEDICARE

## 2018-08-20 VITALS — DIASTOLIC BLOOD PRESSURE: 66 MMHG | SYSTOLIC BLOOD PRESSURE: 106 MMHG | OXYGEN SATURATION: 90 % | HEART RATE: 78 BPM

## 2018-08-20 LAB
INR PPP: 2.9 RATIO
POCT-PROTHROMBIN TIME: 34.9 SECS
QUALITY CONTROL: YES

## 2018-08-20 PROCEDURE — 85610 PROTHROMBIN TIME: CPT | Mod: QW

## 2018-08-20 PROCEDURE — 93793 ANTICOAG MGMT PT WARFARIN: CPT

## 2018-08-27 ENCOUNTER — RX RENEWAL (OUTPATIENT)
Age: 83
End: 2018-08-27

## 2018-09-17 ENCOUNTER — APPOINTMENT (OUTPATIENT)
Dept: CARDIOLOGY | Facility: CLINIC | Age: 83
End: 2018-09-17
Payer: MEDICARE

## 2018-09-17 VITALS — OXYGEN SATURATION: 94 % | HEART RATE: 67 BPM

## 2018-09-17 PROCEDURE — 85610 PROTHROMBIN TIME: CPT | Mod: QW

## 2018-09-17 PROCEDURE — 93793 ANTICOAG MGMT PT WARFARIN: CPT

## 2018-09-18 LAB
INR PPP: 2.3 RATIO
POCT-PROTHROMBIN TIME: 27.7 SECS
QUALITY CONTROL: YES

## 2018-09-25 ENCOUNTER — APPOINTMENT (OUTPATIENT)
Dept: FAMILY MEDICINE | Facility: CLINIC | Age: 83
End: 2018-09-25
Payer: MEDICARE

## 2018-09-25 VITALS
HEIGHT: 74 IN | OXYGEN SATURATION: 96 % | RESPIRATION RATE: 16 BRPM | SYSTOLIC BLOOD PRESSURE: 110 MMHG | WEIGHT: 275 LBS | BODY MASS INDEX: 35.29 KG/M2 | TEMPERATURE: 97.8 F | DIASTOLIC BLOOD PRESSURE: 70 MMHG | HEART RATE: 62 BPM
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
## 2018-09-25 PROCEDURE — G0008: CPT

## 2018-09-25 PROCEDURE — 90686 IIV4 VACC NO PRSV 0.5 ML IM: CPT

## 2018-09-25 PROCEDURE — 99213 OFFICE O/P EST LOW 20 MIN: CPT | Mod: 25

## 2018-09-26 NOTE — HISTORY OF PRESENT ILLNESS
[FreeTextEntry1] : f/u Visit [de-identified] : 82 y/o PMMHX CHF A Fib HTN HLD Hypothyroidism  bilateral leg edema here for f/u at times when walks from one room to next feels has to catch up his breath. Pt no CP palpitations or dizziness does have SOB on exertion  pt now here no pain bowel or bladder issues

## 2018-09-26 NOTE — PHYSICAL EXAM
[No Acute Distress] : no acute distress [No JVD] : no jugular venous distention [Supple] : supple [No Lymphadenopathy] : no lymphadenopathy [Clear to Auscultation] : lungs were clear to auscultation bilaterally [No Accessory Muscle Use] : no accessory muscle use [Normal S1, S2] : normal S1 and S2 [Soft] : abdomen soft [Non Tender] : non-tender [de-identified] : in NAD  Pleasant pt  [de-identified] : IRR [de-identified] : compression stockings on + edema

## 2018-09-26 NOTE — REVIEW OF SYSTEMS
[Fatigue] : fatigue [Lower Ext Edema] : lower extremity edema [Shortness Of Breath] : no shortness of breath [Wheezing] : no wheezing [Cough] : no cough [Dyspnea on Exertion] : dyspnea on exertion [Joint Pain] : joint pain [Joint Stiffness] : joint stiffness [Muscle Pain] : no muscle pain [Muscle Weakness] : no muscle weakness [Back Pain] : no back pain [Joint Swelling] : no joint swelling [Headache] : no headache [Dizziness] : no dizziness [Fainting] : no fainting [Confusion] : no confusion [Unsteady Walk] : ataxia [Memory Loss] : no memory loss [Negative] : Psychiatric

## 2018-10-05 ENCOUNTER — MEDICATION RENEWAL (OUTPATIENT)
Age: 83
End: 2018-10-05

## 2018-10-10 ENCOUNTER — APPOINTMENT (OUTPATIENT)
Dept: CARDIOLOGY | Facility: CLINIC | Age: 83
End: 2018-10-10

## 2018-10-15 ENCOUNTER — LABORATORY RESULT (OUTPATIENT)
Age: 83
End: 2018-10-15

## 2018-10-15 ENCOUNTER — APPOINTMENT (OUTPATIENT)
Dept: CARDIOLOGY | Facility: CLINIC | Age: 83
End: 2018-10-15
Payer: MEDICARE

## 2018-10-15 VITALS — DIASTOLIC BLOOD PRESSURE: 66 MMHG | SYSTOLIC BLOOD PRESSURE: 105 MMHG | HEART RATE: 67 BPM

## 2018-10-15 LAB
ALBUMIN SERPL ELPH-MCNC: 4.5 G/DL
ALP BLD-CCNC: 60 U/L
ALT SERPL-CCNC: 16 U/L
ANION GAP SERPL CALC-SCNC: 13 MMOL/L
AST SERPL-CCNC: 22 U/L
BILIRUB SERPL-MCNC: 0.5 MG/DL
BUN SERPL-MCNC: 19 MG/DL
CALCIUM SERPL-MCNC: 9 MG/DL
CHLORIDE SERPL-SCNC: 101 MMOL/L
CO2 SERPL-SCNC: 28 MMOL/L
CREAT SERPL-MCNC: 1.34 MG/DL
GLUCOSE SERPL-MCNC: 99 MG/DL
INR PPP: 2 RATIO
POCT-PROTHROMBIN TIME: 24.4 SECS
POTASSIUM SERPL-SCNC: 4.3 MMOL/L
PROT SERPL-MCNC: 7 G/DL
QUALITY CONTROL: YES
SODIUM SERPL-SCNC: 142 MMOL/L
TSH SERPL-ACNC: 8.93 UIU/ML

## 2018-10-15 PROCEDURE — 93793 ANTICOAG MGMT PT WARFARIN: CPT

## 2018-10-15 PROCEDURE — 85610 PROTHROMBIN TIME: CPT | Mod: QW

## 2018-10-16 ENCOUNTER — RX RENEWAL (OUTPATIENT)
Age: 83
End: 2018-10-16

## 2018-10-29 ENCOUNTER — APPOINTMENT (OUTPATIENT)
Dept: HEMATOLOGY ONCOLOGY | Facility: CLINIC | Age: 83
End: 2018-10-29
Payer: MEDICARE

## 2018-10-29 VITALS
WEIGHT: 278 LBS | BODY MASS INDEX: 35.69 KG/M2 | SYSTOLIC BLOOD PRESSURE: 102 MMHG | TEMPERATURE: 97.5 F | HEART RATE: 60 BPM | DIASTOLIC BLOOD PRESSURE: 56 MMHG

## 2018-10-29 PROCEDURE — 99213 OFFICE O/P EST LOW 20 MIN: CPT

## 2018-10-31 ENCOUNTER — APPOINTMENT (OUTPATIENT)
Dept: CARDIOLOGY | Facility: CLINIC | Age: 83
End: 2018-10-31
Payer: MEDICARE

## 2018-10-31 VITALS
RESPIRATION RATE: 15 BRPM | BODY MASS INDEX: 35.68 KG/M2 | HEART RATE: 65 BPM | SYSTOLIC BLOOD PRESSURE: 118 MMHG | HEIGHT: 74 IN | WEIGHT: 278 LBS | DIASTOLIC BLOOD PRESSURE: 76 MMHG | OXYGEN SATURATION: 95 %

## 2018-10-31 PROCEDURE — 93000 ELECTROCARDIOGRAM COMPLETE: CPT

## 2018-10-31 PROCEDURE — 99214 OFFICE O/P EST MOD 30 MIN: CPT

## 2018-11-05 ENCOUNTER — OUTPATIENT (OUTPATIENT)
Dept: OUTPATIENT SERVICES | Facility: HOSPITAL | Age: 83
LOS: 1 days | End: 2018-11-05
Payer: MEDICARE

## 2018-11-05 DIAGNOSIS — Z98.89 OTHER SPECIFIED POSTPROCEDURAL STATES: Chronic | ICD-10-CM

## 2018-11-05 DIAGNOSIS — R06.00 DYSPNEA, UNSPECIFIED: ICD-10-CM

## 2018-11-05 PROCEDURE — 94060 EVALUATION OF WHEEZING: CPT | Mod: 26

## 2018-11-05 PROCEDURE — 94726 PLETHYSMOGRAPHY LUNG VOLUMES: CPT | Mod: 26

## 2018-11-05 PROCEDURE — 94729 DIFFUSING CAPACITY: CPT | Mod: 26

## 2018-11-05 PROCEDURE — 94060 EVALUATION OF WHEEZING: CPT

## 2018-11-14 ENCOUNTER — APPOINTMENT (OUTPATIENT)
Dept: CARDIOLOGY | Facility: CLINIC | Age: 83
End: 2018-11-14
Payer: MEDICARE

## 2018-11-14 PROCEDURE — 93793 ANTICOAG MGMT PT WARFARIN: CPT

## 2018-11-14 PROCEDURE — 85610 PROTHROMBIN TIME: CPT | Mod: QW

## 2018-11-14 PROCEDURE — 93289 INTERROG DEVICE EVAL HEART: CPT

## 2018-11-14 NOTE — DISCUSSION/SUMMARY
[Pacemaker Function Normal] : normal pacemaker function [AICD Function Normal] : normal AICD function

## 2018-11-15 LAB
INR PPP: 2 RATIO
POCT-PROTHROMBIN TIME: 24.3 SECS
QUALITY CONTROL: YES

## 2018-12-04 ENCOUNTER — APPOINTMENT (OUTPATIENT)
Dept: CARDIOLOGY | Facility: CLINIC | Age: 83
End: 2018-12-04
Payer: MEDICARE

## 2018-12-04 LAB
INR PPP: 2 RATIO
POCT-PROTHROMBIN TIME: 24.3 SECS
QUALITY CONTROL: YES

## 2018-12-04 PROCEDURE — 85610 PROTHROMBIN TIME: CPT | Mod: QW

## 2018-12-04 PROCEDURE — 93793 ANTICOAG MGMT PT WARFARIN: CPT

## 2018-12-05 ENCOUNTER — RX RENEWAL (OUTPATIENT)
Age: 83
End: 2018-12-05

## 2018-12-07 ENCOUNTER — APPOINTMENT (OUTPATIENT)
Dept: CARDIOLOGY | Facility: CLINIC | Age: 83
End: 2018-12-07
Payer: MEDICARE

## 2018-12-07 LAB
INR PPP: 1.9 RATIO
POCT-PROTHROMBIN TIME: 23.2 SECS
QUALITY CONTROL: YES

## 2018-12-07 PROCEDURE — 85610 PROTHROMBIN TIME: CPT | Mod: QW

## 2018-12-07 PROCEDURE — G0250: CPT

## 2018-12-10 ENCOUNTER — RX RENEWAL (OUTPATIENT)
Age: 83
End: 2018-12-10

## 2018-12-11 ENCOUNTER — OUTPATIENT (OUTPATIENT)
Dept: OUTPATIENT SERVICES | Facility: HOSPITAL | Age: 83
LOS: 1 days | End: 2018-12-11
Payer: MEDICARE

## 2018-12-11 ENCOUNTER — APPOINTMENT (OUTPATIENT)
Dept: FAMILY MEDICINE | Facility: CLINIC | Age: 83
End: 2018-12-11

## 2018-12-11 VITALS
TEMPERATURE: 98 F | OXYGEN SATURATION: 98 % | DIASTOLIC BLOOD PRESSURE: 76 MMHG | WEIGHT: 274.92 LBS | HEART RATE: 67 BPM | RESPIRATION RATE: 20 BRPM | HEIGHT: 74 IN | SYSTOLIC BLOOD PRESSURE: 158 MMHG

## 2018-12-11 DIAGNOSIS — Z98.89 OTHER SPECIFIED POSTPROCEDURAL STATES: Chronic | ICD-10-CM

## 2018-12-11 DIAGNOSIS — Z45.02 ENCOUNTER FOR ADJUSTMENT AND MANAGEMENT OF AUTOMATIC IMPLANTABLE CARDIAC DEFIBRILLATOR: ICD-10-CM

## 2018-12-11 LAB
ANION GAP SERPL CALC-SCNC: 12 MMOL/L — SIGNIFICANT CHANGE UP (ref 5–17)
BLD GP AB SCN SERPL QL: NEGATIVE — SIGNIFICANT CHANGE UP
BUN SERPL-MCNC: 17 MG/DL — SIGNIFICANT CHANGE UP (ref 7–23)
CALCIUM SERPL-MCNC: 9.1 MG/DL — SIGNIFICANT CHANGE UP (ref 8.4–10.5)
CHLORIDE SERPL-SCNC: 101 MMOL/L — SIGNIFICANT CHANGE UP (ref 96–108)
CO2 SERPL-SCNC: 31 MMOL/L — SIGNIFICANT CHANGE UP (ref 22–31)
CREAT SERPL-MCNC: 1.29 MG/DL — SIGNIFICANT CHANGE UP (ref 0.5–1.3)
GLUCOSE SERPL-MCNC: 93 MG/DL — SIGNIFICANT CHANGE UP (ref 70–99)
HCT VFR BLD CALC: 38.8 % — LOW (ref 39–50)
HGB BLD-MCNC: 13.4 G/DL — SIGNIFICANT CHANGE UP (ref 13–17)
INR BLD: 1.58 RATIO — HIGH (ref 0.88–1.16)
MCHC RBC-ENTMCNC: 34.3 PG — HIGH (ref 27–34)
MCHC RBC-ENTMCNC: 34.6 GM/DL — SIGNIFICANT CHANGE UP (ref 32–36)
MCV RBC AUTO: 99.3 FL — SIGNIFICANT CHANGE UP (ref 80–100)
PLATELET # BLD AUTO: 102 K/UL — LOW (ref 150–400)
POTASSIUM SERPL-MCNC: 4.3 MMOL/L — SIGNIFICANT CHANGE UP (ref 3.5–5.3)
POTASSIUM SERPL-SCNC: 4.3 MMOL/L — SIGNIFICANT CHANGE UP (ref 3.5–5.3)
PROTHROM AB SERPL-ACNC: 18.3 SEC — HIGH (ref 10–12.9)
RBC # BLD: 3.91 M/UL — LOW (ref 4.2–5.8)
RBC # FLD: 11.3 % — SIGNIFICANT CHANGE UP (ref 10.3–14.5)
RH IG SCN BLD-IMP: POSITIVE — SIGNIFICANT CHANGE UP
SODIUM SERPL-SCNC: 144 MMOL/L — SIGNIFICANT CHANGE UP (ref 135–145)
WBC # BLD: 3 K/UL — LOW (ref 3.8–10.5)
WBC # FLD AUTO: 3 K/UL — LOW (ref 3.8–10.5)

## 2018-12-11 PROCEDURE — 93010 ELECTROCARDIOGRAM REPORT: CPT

## 2018-12-11 PROCEDURE — 86901 BLOOD TYPING SEROLOGIC RH(D): CPT

## 2018-12-11 PROCEDURE — 86850 RBC ANTIBODY SCREEN: CPT

## 2018-12-11 PROCEDURE — 93005 ELECTROCARDIOGRAM TRACING: CPT

## 2018-12-11 PROCEDURE — 33263 RMVL & RPLCMT DFB GEN 2 LEAD: CPT

## 2018-12-11 PROCEDURE — 99203 OFFICE O/P NEW LOW 30 MIN: CPT

## 2018-12-11 PROCEDURE — 80048 BASIC METABOLIC PNL TOTAL CA: CPT

## 2018-12-11 PROCEDURE — C1882: CPT

## 2018-12-11 PROCEDURE — 86900 BLOOD TYPING SEROLOGIC ABO: CPT

## 2018-12-11 PROCEDURE — 85610 PROTHROMBIN TIME: CPT

## 2018-12-11 PROCEDURE — 85027 COMPLETE CBC AUTOMATED: CPT

## 2018-12-11 RX ORDER — SODIUM CHLORIDE 9 MG/ML
3 INJECTION INTRAMUSCULAR; INTRAVENOUS; SUBCUTANEOUS EVERY 8 HOURS
Qty: 0 | Refills: 0 | Status: DISCONTINUED | OUTPATIENT
Start: 2018-12-11 | End: 2018-12-26

## 2018-12-11 RX ORDER — CEPHALEXIN 500 MG
1 CAPSULE ORAL
Qty: 3 | Refills: 0 | OUTPATIENT
Start: 2018-12-11 | End: 2018-12-11

## 2018-12-11 NOTE — H&P CARDIOLOGY - PMH
AICD (automatic cardioverter/defibrillator) present    Arthritis    Atrial fibrillation    Bilateral edema of lower extremity    Chronic systolic congestive heart failure    Coronary artery disease due to calcified coronary lesion    Essential hypertension    Gastroesophageal reflux disease without esophagitis    HLD (hyperlipidemia)    Iliac artery aneurysm    CJ (obstructive sleep apnea)    Other specified hypothyroidism    Skin cancer    Venous insufficiency

## 2018-12-17 ENCOUNTER — APPOINTMENT (OUTPATIENT)
Dept: ELECTROPHYSIOLOGY | Facility: CLINIC | Age: 83
End: 2018-12-17
Payer: MEDICARE

## 2018-12-17 ENCOUNTER — NON-APPOINTMENT (OUTPATIENT)
Age: 83
End: 2018-12-17

## 2018-12-17 VITALS
SYSTOLIC BLOOD PRESSURE: 130 MMHG | BODY MASS INDEX: 35.68 KG/M2 | WEIGHT: 278 LBS | HEIGHT: 74 IN | OXYGEN SATURATION: 96 % | DIASTOLIC BLOOD PRESSURE: 76 MMHG | HEART RATE: 62 BPM

## 2018-12-17 PROCEDURE — 99024 POSTOP FOLLOW-UP VISIT: CPT

## 2018-12-19 ENCOUNTER — RX RENEWAL (OUTPATIENT)
Age: 83
End: 2018-12-19

## 2018-12-21 ENCOUNTER — APPOINTMENT (OUTPATIENT)
Dept: CARDIOLOGY | Facility: CLINIC | Age: 83
End: 2018-12-21
Payer: MEDICARE

## 2018-12-21 ENCOUNTER — NON-APPOINTMENT (OUTPATIENT)
Age: 83
End: 2018-12-21

## 2018-12-21 VITALS
HEIGHT: 74 IN | BODY MASS INDEX: 35.16 KG/M2 | SYSTOLIC BLOOD PRESSURE: 118 MMHG | WEIGHT: 274 LBS | RESPIRATION RATE: 17 BRPM | DIASTOLIC BLOOD PRESSURE: 74 MMHG | OXYGEN SATURATION: 93 % | HEART RATE: 61 BPM

## 2018-12-21 LAB — INR PPP: 1.9 RATIO

## 2018-12-21 PROCEDURE — 99214 OFFICE O/P EST MOD 30 MIN: CPT

## 2018-12-21 PROCEDURE — 93000 ELECTROCARDIOGRAM COMPLETE: CPT

## 2018-12-21 PROCEDURE — 85610 PROTHROMBIN TIME: CPT | Mod: QW

## 2018-12-21 NOTE — PHYSICAL EXAM
[General Appearance - Well Developed] : well developed [Well Groomed] : well groomed [General Appearance - Well Nourished] : well nourished [General Appearance - In No Acute Distress] : no acute distress [Normal Conjunctiva] : the conjunctiva exhibited no abnormalities [Eyelids - No Xanthelasma] : the eyelids demonstrated no xanthelasmas [Normal Oral Mucosa] : normal oral mucosa [No Oral Pallor] : no oral pallor [No Oral Cyanosis] : no oral cyanosis [Normal Jugular Venous A Waves Present] : normal jugular venous A waves present [Normal Jugular Venous V Waves Present] : normal jugular venous V waves present [No Jugular Venous Perez A Waves] : no jugular venous perez A waves [Respiration, Rhythm And Depth] : normal respiratory rhythm and effort [Exaggerated Use Of Accessory Muscles For Inspiration] : no accessory muscle use [Auscultation Breath Sounds / Voice Sounds] : lungs were clear to auscultation bilaterally [Edema] : no peripheral edema present [Abdomen Soft] : soft [Abdomen Tenderness] : non-tender [Abdomen Mass (___ Cm)] : no abdominal mass palpated [Skin Color & Pigmentation] : normal skin color and pigmentation [] : no rash [No Venous Stasis] : no venous stasis [Skin Lesions] : no skin lesions [No Skin Ulcers] : no skin ulcer [No Xanthoma] : no  xanthoma was observed [Oriented To Time, Place, And Person] : oriented to person, place, and time [Affect] : the affect was normal [Mood] : the mood was normal [No Anxiety] : not feeling anxious [Left Infraclavicular] : left infraclavicular area [FreeTextEntry1] : clean protruding wire but no erythema or sings of dermatologic erythema or compromise

## 2018-12-21 NOTE — REVIEW OF SYSTEMS
[Feeling Fatigued] : feeling fatigued [Shortness Of Breath] : shortness of breath [Anxiety] : anxiety [Negative] : Heme/Lymph

## 2018-12-21 NOTE — CARDIOLOGY SUMMARY
[No Ischemia] : no Ischemia [Fixed Defect] : fixed defect [LVEF ___%] : LVEF [unfilled]% [Moderate] : moderate LV dysfunction [Enlarged] : enlarged LA size [___] : [unfilled]

## 2018-12-21 NOTE — DISCUSSION/SUMMARY
[INR Low] : the patient's INR is sub-therapeutic [Medication Changes Per Orders] : as documented in orders [Not Responding to Treatment] : not responding to treatment [Heart Failure Diastolic] : diastolic heart failure [Asthma] : asthma [COPD] : chronic obstructive pulmonary disease [Pulmonary Function Tests] : pulmonary function tests [Patient] : the patient [Family] : the patient's family [FreeTextEntry1] : currently of clopidogrel given a remote mesenteric stent

## 2018-12-21 NOTE — REASON FOR VISIT
[Follow-Up - Clinic] : a clinic follow-up of [Anticoagulation] : anticoagulation [Atrial Fibrillation] : atrial fibrillation [Medication Management] : Medication management [Spouse] : spouse [FreeTextEntry1] : Jj has sob when he walks and when he bends over. he sometime sob has to stop to catch breath. he has leg edema. He underwent a successful pacer replacement at [facility]. he is somewhat under anticoagulated and his meds are adjusted.

## 2018-12-27 ENCOUNTER — APPOINTMENT (OUTPATIENT)
Dept: CARDIOLOGY | Facility: CLINIC | Age: 83
End: 2018-12-27
Payer: MEDICARE

## 2018-12-27 VITALS — DIASTOLIC BLOOD PRESSURE: 64 MMHG | OXYGEN SATURATION: 95 % | HEART RATE: 64 BPM | SYSTOLIC BLOOD PRESSURE: 102 MMHG

## 2018-12-27 LAB
INR PPP: 2.7 RATIO
POCT-PROTHROMBIN TIME: 33.7 SECS
QUALITY CONTROL: YES

## 2018-12-27 PROCEDURE — 85610 PROTHROMBIN TIME: CPT | Mod: QW

## 2018-12-27 PROCEDURE — 93793 ANTICOAG MGMT PT WARFARIN: CPT

## 2019-01-09 ENCOUNTER — FORM ENCOUNTER (OUTPATIENT)
Age: 84
End: 2019-01-09

## 2019-01-10 ENCOUNTER — APPOINTMENT (OUTPATIENT)
Dept: FAMILY MEDICINE | Facility: CLINIC | Age: 84
End: 2019-01-10
Payer: MEDICARE

## 2019-01-10 ENCOUNTER — OUTPATIENT (OUTPATIENT)
Dept: OUTPATIENT SERVICES | Facility: HOSPITAL | Age: 84
LOS: 1 days | End: 2019-01-10
Payer: MEDICARE

## 2019-01-10 VITALS
HEART RATE: 60 BPM | SYSTOLIC BLOOD PRESSURE: 152 MMHG | RESPIRATION RATE: 16 BRPM | WEIGHT: 278 LBS | OXYGEN SATURATION: 94 % | HEIGHT: 74 IN | TEMPERATURE: 97.3 F | BODY MASS INDEX: 35.68 KG/M2 | DIASTOLIC BLOOD PRESSURE: 78 MMHG

## 2019-01-10 DIAGNOSIS — H26.9 UNSPECIFIED CATARACT: ICD-10-CM

## 2019-01-10 DIAGNOSIS — E78.5 HYPERLIPIDEMIA, UNSPECIFIED: ICD-10-CM

## 2019-01-10 DIAGNOSIS — M54.5 LOW BACK PAIN: ICD-10-CM

## 2019-01-10 DIAGNOSIS — Z98.89 OTHER SPECIFIED POSTPROCEDURAL STATES: Chronic | ICD-10-CM

## 2019-01-10 PROCEDURE — 72100 X-RAY EXAM L-S SPINE 2/3 VWS: CPT | Mod: 26

## 2019-01-10 PROCEDURE — 99214 OFFICE O/P EST MOD 30 MIN: CPT

## 2019-01-10 PROCEDURE — 72100 X-RAY EXAM L-S SPINE 2/3 VWS: CPT

## 2019-01-10 NOTE — PHYSICAL EXAM
[No Acute Distress] : no acute distress [PERRL] : pupils equal round and reactive to light [EOMI] : extraocular movements intact [Normal Oropharynx] : the oropharynx was normal [Normal TMs] : both tympanic membranes were normal [No JVD] : no jugular venous distention [Supple] : supple [No Respiratory Distress] : no respiratory distress  [Clear to Auscultation] : lungs were clear to auscultation bilaterally [Normal S1, S2] : normal S1 and S2 [Normal Affect] : the affect was normal [Normal Insight/Judgement] : insight and judgment were intact [de-identified] : obese in distress secondary to back pain improved after vomitted and sat for a while [de-identified] : small pupils [de-identified] : missing molars crowded oropharynx [de-identified] : left upper chest wall scar clean healing well [de-identified] : IRR [de-identified] : LS spine tenderness L5-L4 area bilateral perispinal muscle spasm negative leg raise no rashes on skin [de-identified] : 3+ leg edema symmetric pitting no rashes on skin + skin vascular changes from venus stasis [de-identified] : antalgic gait uses cane to ambulate Difficult gettign from stading to strtcher

## 2019-01-10 NOTE — COUNSELING
[Weight management counseling provided] : Weight management [Healthy eating counseling provided] : healthy eating [Activity counseling provided] : activity [Weight Self Once Weekly] : Weight self once weekly [Decrease Portions] : Decrease food portions [Low Salt Diet] : Low salt diet [___ min/wk activity recommended] : [unfilled] min/wk activity recommended [Walking] : Walking [None] : None [Good understanding] : Patient has a good understanding of lifestyle changes and the steps needed to achieve self management goals

## 2019-01-10 NOTE — RESULTS/DATA
[] : results reviewed [de-identified] : INR 1.58 states Coumadin was adjusted f/u cardiology [de-identified] : CKD 3 stable

## 2019-01-10 NOTE — REVIEW OF SYSTEMS
[Lower Ext Edema] : lower extremity edema [Dyspnea on Exertion] : dyspnea on exertion [Back Pain] : back pain [Negative] : Heme/Lymph [Vision Problems] : no vision problems [Chest Pain] : no chest pain [Palpitations] : no palpitations [Claudication] : no  leg claudication [Orthopena] : no orthopnea [Shortness Of Breath] : no shortness of breath [Wheezing] : no wheezing [Cough] : no cough [FreeTextEntry3] : cataracts  [FreeTextEntry4] : hard of hearing  [FreeTextEntry9] : severe back pain now present for 3 days

## 2019-01-10 NOTE — RESULTS/DATA
[] : results reviewed [de-identified] : INR 1.58 states Coumadin was adjusted f/u cardiology [de-identified] : CKD 3 stable

## 2019-01-10 NOTE — HISTORY OF PRESENT ILLNESS
[de-identified] : 83 y/o PMHX A Fib CHF HTN HLD SOB, CKD #, Pre-DM2 LUCIUS s/p AICD  changed at [facility] here for f/u on 12/13/18 Pt now here states scar is healing and now c/o severe pain LS Spine now present 2 days no falls or trauma and states just woke up in pain and is persistent now 10/10 does not radiate to lower extremities  [Atrial Fibrillation] : atrial fibrillation [Coronary Artery Disease] : coronary artery disease [Implantable Device/Pacemaker] : implantable device/pacemaker [Sleep Apnea] : sleep apnea [Chronic Anticoagulation] : chronic anticoagulation [Chronic Kidney Disease] : chronic kidney disease [(Patient denies any chest pain, claudication, dyspnea on exertion, orthopnea, palpitations or syncope)] : Patient denies any chest pain, claudication, dyspnea on exertion, orthopnea, palpitations or syncope [Recent Myocardial Infarction] : no recent myocardial infarction [Asthma] : no asthma [COPD] : no COPD [Smoker] : not a smoker [Family Member] : no family member with adverse anesthesia reaction/sudden death [Self] : no previous adverse anesthesia reaction [Diabetes] : no diabetes [FreeTextEntry1] : Right eye Cataracts removal [FreeTextEntry2] : 1/16/2019 [FreeTextEntry3] : Dr Portillo [FreeTextEntry4] : 85 y/o states is here for preoperative evaluation for right eye cataract removal. Pt had left one done in the past. Pt PMXH  A Fib,  bilateral leg edema PAD with h/o iliac stents, CKD 3, CJ,  CHF HTN HLD CHF with recent placement of new AICD still with some dyspnea on exertion. Recent LVEF 48%. Now here c/o severe LS spine pain when tries to move 10/10 not able to straighten out. Pt states present for 3 days no fall trauma just woke up in pain states needs change mattress. Pt no skin rashes no pain radiating to legs no bowel or bladder issues. Pt states scar from ICD is healing well. pt got severe pain when moved in room felt nauseated and vomited once then continue exam felt much better [FreeTextEntry6] : Does c/o SOB and has apt with pulmonary MD evaluate for dyspnea

## 2019-01-10 NOTE — ASSESSMENT
[Patient Optimized for Surgery] : Patient optimized for surgery [No Further Testing Recommended] : no further testing recommended [Continue anticoagulant treatment as is] : Continue current anticoagulant treatment [Continue anti-platelet treatment as is] : Continue current anti-platelet treatment [As per surgery] : as per surgery [FreeTextEntry7] : take HTN meds with little water the morning of surgery

## 2019-01-10 NOTE — HISTORY OF PRESENT ILLNESS
[de-identified] : 85 y/o PMHX A Fib CHF HTN HLD SOB, CKD #, Pre-DM2 LUCIUS s/p AICD  changed at [facility] [place] here for f/u on 12/13/18 Pt now here states scar is healing and now c/o severe pain LS Spine now present 2 days no falls or trauma and states just woke up in pain and is persistent now 10/10 does not radiate to lower extremities  [Atrial Fibrillation] : atrial fibrillation [Coronary Artery Disease] : coronary artery disease [Implantable Device/Pacemaker] : implantable device/pacemaker [Sleep Apnea] : sleep apnea [Chronic Anticoagulation] : chronic anticoagulation [Chronic Kidney Disease] : chronic kidney disease [(Patient denies any chest pain, claudication, dyspnea on exertion, orthopnea, palpitations or syncope)] : Patient denies any chest pain, claudication, dyspnea on exertion, orthopnea, palpitations or syncope [Recent Myocardial Infarction] : no recent myocardial infarction [Asthma] : no asthma [COPD] : no COPD [Smoker] : not a smoker [Family Member] : no family member with adverse anesthesia reaction/sudden death [Self] : no previous adverse anesthesia reaction [Diabetes] : no diabetes [FreeTextEntry1] : Right eye Cataracts removal [FreeTextEntry2] : 1/16/2019 [FreeTextEntry3] : Dr Portillo [FreeTextEntry4] : 83 y/o states is here for preoperative evaluation for right eye cataract removal. Pt had left one done in the past. Pt PMXH  A Fib,  bilateral leg edema PAD with h/o iliac stents, CKD 3, CJ,  CHF HTN HLD CHF with recent placement of new AICD still with some dyspnea on exertion. Recent LVEF 48%. Now here c/o severe LS spine pain when tries to move 10/10 not able to straighten out. Pt states present for 3 days no fall trauma just woke up in pain states needs change mattress. Pt no skin rashes no pain radiating to legs no bowel or bladder issues. Pt states scar from ICD is healing well. pt got severe pain when moved in room felt nauseated and vomited once then continue exam felt much better [FreeTextEntry6] : Does c/o SOB and has apt with pulmonary MD evaluate for dyspnea

## 2019-01-10 NOTE — PHYSICAL EXAM
[No Acute Distress] : no acute distress [PERRL] : pupils equal round and reactive to light [EOMI] : extraocular movements intact [Normal Oropharynx] : the oropharynx was normal [Normal TMs] : both tympanic membranes were normal [No JVD] : no jugular venous distention [Supple] : supple [No Respiratory Distress] : no respiratory distress  [Clear to Auscultation] : lungs were clear to auscultation bilaterally [Normal S1, S2] : normal S1 and S2 [Normal Affect] : the affect was normal [Normal Insight/Judgement] : insight and judgment were intact [de-identified] : obese in distress secondary to back pain improved after vomitted and sat for a while [de-identified] : small pupils [de-identified] : missing molars crowded oropharynx [de-identified] : left upper chest wall scar clean healing well [de-identified] : IRR [de-identified] : LS spine tenderness L5-L4 area bilateral perispinal muscle spasm negative leg raise no rashes on skin [de-identified] : 3+ leg edema symmetric pitting no rashes on skin + skin vascular changes from venus stasis [de-identified] : antalgic gait uses cane to ambulate Difficult gettign from stading to strtcher

## 2019-01-15 ENCOUNTER — TRANSCRIPTION ENCOUNTER (OUTPATIENT)
Age: 84
End: 2019-01-15

## 2019-01-15 RX ORDER — ACETAMINOPHEN 500 MG
650 TABLET ORAL EVERY 6 HOURS
Qty: 0 | Refills: 0 | Status: DISCONTINUED | OUTPATIENT
Start: 2019-01-16 | End: 2019-01-31

## 2019-01-16 ENCOUNTER — OUTPATIENT (OUTPATIENT)
Dept: OUTPATIENT SERVICES | Facility: HOSPITAL | Age: 84
LOS: 1 days | End: 2019-01-16
Payer: MEDICARE

## 2019-01-16 VITALS
TEMPERATURE: 98 F | DIASTOLIC BLOOD PRESSURE: 76 MMHG | HEIGHT: 74 IN | RESPIRATION RATE: 18 BRPM | SYSTOLIC BLOOD PRESSURE: 134 MMHG | WEIGHT: 274.92 LBS | OXYGEN SATURATION: 96 % | HEART RATE: 69 BPM

## 2019-01-16 VITALS
TEMPERATURE: 99 F | HEART RATE: 66 BPM | RESPIRATION RATE: 14 BRPM | OXYGEN SATURATION: 99 % | SYSTOLIC BLOOD PRESSURE: 126 MMHG | DIASTOLIC BLOOD PRESSURE: 77 MMHG

## 2019-01-16 DIAGNOSIS — Z98.89 OTHER SPECIFIED POSTPROCEDURAL STATES: Chronic | ICD-10-CM

## 2019-01-16 DIAGNOSIS — H25.11 AGE-RELATED NUCLEAR CATARACT, RIGHT EYE: ICD-10-CM

## 2019-01-16 PROCEDURE — 66984 XCAPSL CTRC RMVL W/O ECP: CPT | Mod: RT

## 2019-01-16 PROCEDURE — V2632: CPT

## 2019-01-16 RX ORDER — CYCLOPENTOLATE HYDROCHLORIDE 10 MG/ML
1 SOLUTION/ DROPS OPHTHALMIC
Qty: 0 | Refills: 0 | Status: COMPLETED | OUTPATIENT
Start: 2019-01-16 | End: 2019-01-16

## 2019-01-16 RX ORDER — KETOROLAC TROMETHAMINE 0.5 %
1 DROPS OPHTHALMIC (EYE)
Qty: 0 | Refills: 0 | Status: COMPLETED | OUTPATIENT
Start: 2019-01-16 | End: 2019-01-16

## 2019-01-16 RX ORDER — SODIUM CHLORIDE 9 MG/ML
1000 INJECTION, SOLUTION INTRAVENOUS
Qty: 0 | Refills: 0 | Status: DISCONTINUED | OUTPATIENT
Start: 2019-01-16 | End: 2019-01-16

## 2019-01-16 RX ORDER — TROPICAMIDE 1 %
1 DROPS OPHTHALMIC (EYE)
Qty: 0 | Refills: 0 | Status: COMPLETED | OUTPATIENT
Start: 2019-01-16 | End: 2019-01-16

## 2019-01-16 RX ORDER — PHENYLEPHRINE HCL 2.5 %
1 DROPS OPHTHALMIC (EYE)
Qty: 0 | Refills: 0 | Status: COMPLETED | OUTPATIENT
Start: 2019-01-16 | End: 2019-01-16

## 2019-01-16 RX ADMIN — CYCLOPENTOLATE HYDROCHLORIDE 1 DROP(S): 10 SOLUTION/ DROPS OPHTHALMIC at 06:20

## 2019-01-16 RX ADMIN — Medication 1 DROP(S): at 06:15

## 2019-01-16 RX ADMIN — SODIUM CHLORIDE 40 MILLILITER(S): 9 INJECTION, SOLUTION INTRAVENOUS at 06:33

## 2019-01-16 RX ADMIN — Medication 1 DROP(S): at 06:20

## 2019-01-16 RX ADMIN — CYCLOPENTOLATE HYDROCHLORIDE 1 DROP(S): 10 SOLUTION/ DROPS OPHTHALMIC at 06:15

## 2019-01-16 RX ADMIN — Medication 1 DROP(S): at 06:25

## 2019-01-16 RX ADMIN — CYCLOPENTOLATE HYDROCHLORIDE 1 DROP(S): 10 SOLUTION/ DROPS OPHTHALMIC at 06:25

## 2019-01-16 NOTE — ASU DISCHARGE PLAN (ADULT/PEDIATRIC). - NOTIFY
Swelling that continues/Pain not relieved by Medications/Persistent Nausea and Vomiting/Inability to Tolerate Liquids or Foods/Fever greater than 101/Bleeding that does not stop

## 2019-01-16 NOTE — BRIEF OPERATIVE NOTE - PROCEDURE
<<-----Click on this checkbox to enter Procedure Phacoemulsification of cataract of right eye with implantation of foldable intraocular lens  01/16/2019    Active  AGIRARDI

## 2019-01-22 ENCOUNTER — APPOINTMENT (OUTPATIENT)
Dept: CARDIOLOGY | Facility: CLINIC | Age: 84
End: 2019-01-22
Payer: MEDICARE

## 2019-01-22 VITALS — OXYGEN SATURATION: 99 % | SYSTOLIC BLOOD PRESSURE: 124 MMHG | DIASTOLIC BLOOD PRESSURE: 76 MMHG | HEART RATE: 59 BPM

## 2019-01-22 LAB
INR PPP: 3.3 RATIO
POCT-PROTHROMBIN TIME: 40 SECS
QUALITY CONTROL: YES

## 2019-01-22 PROCEDURE — 85610 PROTHROMBIN TIME: CPT | Mod: QW

## 2019-01-22 PROCEDURE — 93793 ANTICOAG MGMT PT WARFARIN: CPT

## 2019-01-25 ENCOUNTER — APPOINTMENT (OUTPATIENT)
Dept: CT IMAGING | Facility: HOSPITAL | Age: 84
End: 2019-01-25
Payer: MEDICARE

## 2019-01-25 ENCOUNTER — OUTPATIENT (OUTPATIENT)
Dept: OUTPATIENT SERVICES | Facility: HOSPITAL | Age: 84
LOS: 1 days | End: 2019-01-25
Payer: MEDICARE

## 2019-01-25 DIAGNOSIS — Z98.89 OTHER SPECIFIED POSTPROCEDURAL STATES: Chronic | ICD-10-CM

## 2019-01-25 DIAGNOSIS — R10.13 EPIGASTRIC PAIN: ICD-10-CM

## 2019-01-25 PROCEDURE — 82565 ASSAY OF CREATININE: CPT

## 2019-01-25 PROCEDURE — 74177 CT ABD & PELVIS W/CONTRAST: CPT | Mod: 26

## 2019-01-25 PROCEDURE — 74177 CT ABD & PELVIS W/CONTRAST: CPT

## 2019-02-05 ENCOUNTER — OUTPATIENT (OUTPATIENT)
Dept: OUTPATIENT SERVICES | Facility: HOSPITAL | Age: 84
LOS: 1 days | End: 2019-02-05
Payer: MEDICARE

## 2019-02-05 ENCOUNTER — APPOINTMENT (OUTPATIENT)
Dept: RADIOLOGY | Facility: HOSPITAL | Age: 84
End: 2019-02-05
Payer: MEDICARE

## 2019-02-05 ENCOUNTER — RX RENEWAL (OUTPATIENT)
Age: 84
End: 2019-02-05

## 2019-02-05 ENCOUNTER — APPOINTMENT (OUTPATIENT)
Dept: CARDIOLOGY | Facility: CLINIC | Age: 84
End: 2019-02-05
Payer: MEDICARE

## 2019-02-05 DIAGNOSIS — Z98.89 OTHER SPECIFIED POSTPROCEDURAL STATES: Chronic | ICD-10-CM

## 2019-02-05 DIAGNOSIS — Z00.8 ENCOUNTER FOR OTHER GENERAL EXAMINATION: ICD-10-CM

## 2019-02-05 LAB
INR PPP: 3.1 RATIO
POCT-PROTHROMBIN TIME: 37.8 SECS
QUALITY CONTROL: YES

## 2019-02-05 PROCEDURE — 85610 PROTHROMBIN TIME: CPT | Mod: QW

## 2019-02-05 PROCEDURE — 99211 OFF/OP EST MAY X REQ PHY/QHP: CPT | Mod: 25

## 2019-02-05 PROCEDURE — 71046 X-RAY EXAM CHEST 2 VIEWS: CPT

## 2019-02-05 PROCEDURE — 71046 X-RAY EXAM CHEST 2 VIEWS: CPT | Mod: 26

## 2019-02-20 DIAGNOSIS — H91.90 UNSPECIFIED HEARING LOSS, UNSPECIFIED EAR: ICD-10-CM

## 2019-02-21 ENCOUNTER — APPOINTMENT (OUTPATIENT)
Dept: CARDIOLOGY | Facility: CLINIC | Age: 84
End: 2019-02-21
Payer: MEDICARE

## 2019-02-21 VITALS — OXYGEN SATURATION: 93 % | HEART RATE: 59 BPM

## 2019-02-21 PROCEDURE — 85610 PROTHROMBIN TIME: CPT | Mod: QW

## 2019-02-21 PROCEDURE — 99211 OFF/OP EST MAY X REQ PHY/QHP: CPT | Mod: 25

## 2019-02-25 LAB
INR PPP: 2.3 RATIO
POCT-PROTHROMBIN TIME: 27.1 SECS
QUALITY CONTROL: YES

## 2019-03-06 ENCOUNTER — LABORATORY RESULT (OUTPATIENT)
Age: 84
End: 2019-03-06

## 2019-03-06 ENCOUNTER — RX RENEWAL (OUTPATIENT)
Age: 84
End: 2019-03-06

## 2019-03-19 ENCOUNTER — APPOINTMENT (OUTPATIENT)
Dept: CARDIOLOGY | Facility: CLINIC | Age: 84
End: 2019-03-19
Payer: MEDICARE

## 2019-03-19 VITALS — OXYGEN SATURATION: 97 % | HEART RATE: 60 BPM

## 2019-03-19 LAB
INR PPP: 2.6 RATIO
POCT-PROTHROMBIN TIME: 31 SECS
QUALITY CONTROL: YES

## 2019-03-19 PROCEDURE — 99211 OFF/OP EST MAY X REQ PHY/QHP: CPT | Mod: 25

## 2019-03-19 PROCEDURE — 85610 PROTHROMBIN TIME: CPT | Mod: QW

## 2019-03-22 ENCOUNTER — APPOINTMENT (OUTPATIENT)
Dept: CARDIOLOGY | Facility: CLINIC | Age: 84
End: 2019-03-22
Payer: MEDICARE

## 2019-03-22 ENCOUNTER — NON-APPOINTMENT (OUTPATIENT)
Age: 84
End: 2019-03-22

## 2019-03-22 VITALS
DIASTOLIC BLOOD PRESSURE: 65 MMHG | HEART RATE: 60 BPM | HEIGHT: 74 IN | SYSTOLIC BLOOD PRESSURE: 103 MMHG | BODY MASS INDEX: 35.29 KG/M2 | OXYGEN SATURATION: 93 % | WEIGHT: 275 LBS | RESPIRATION RATE: 17 BRPM

## 2019-03-22 PROCEDURE — 93000 ELECTROCARDIOGRAM COMPLETE: CPT

## 2019-03-22 PROCEDURE — 99213 OFFICE O/P EST LOW 20 MIN: CPT

## 2019-03-24 NOTE — CARDIOLOGY SUMMARY
[No Ischemia] : no Ischemia [Fixed Defect] : fixed defect [LVEF ___%] : LVEF [unfilled]% [Moderate] : moderate LV dysfunction None [Enlarged] : enlarged LA size [___] : [unfilled]

## 2019-03-24 NOTE — DISCUSSION/SUMMARY
[Congestive Heart Failure] : congestive heart failure [Coronary Artery Disease] : coronary artery disease [Stable] : stable [Responding to Treatment] : responding to treatment

## 2019-03-24 NOTE — REASON FOR VISIT
[FreeTextEntry1] : WE discussed the rationale for informed consent for  pacemaker change. He had a Coumadin check 2 days ago. He  saw dr Rivas from pulmonary in [address] location on Monday at dr peterson office.

## 2019-03-26 ENCOUNTER — APPOINTMENT (OUTPATIENT)
Dept: ELECTROPHYSIOLOGY | Facility: HOSPITAL | Age: 84
End: 2019-03-26
Payer: MEDICARE

## 2019-03-26 PROCEDURE — 93295 DEV INTERROG REMOTE 1/2/MLT: CPT

## 2019-03-26 PROCEDURE — 93296 REM INTERROG EVL PM/IDS: CPT

## 2019-04-16 ENCOUNTER — APPOINTMENT (OUTPATIENT)
Dept: CARDIOLOGY | Facility: CLINIC | Age: 84
End: 2019-04-16
Payer: MEDICARE

## 2019-04-16 VITALS — HEART RATE: 73 BPM | OXYGEN SATURATION: 96 %

## 2019-04-16 LAB
INR PPP: 2.1 RATIO
POCT-PROTHROMBIN TIME: 25.8 SECS
QUALITY CONTROL: YES

## 2019-04-16 PROCEDURE — 99211 OFF/OP EST MAY X REQ PHY/QHP: CPT | Mod: 25

## 2019-04-16 PROCEDURE — 85610 PROTHROMBIN TIME: CPT | Mod: QW

## 2019-05-02 ENCOUNTER — APPOINTMENT (OUTPATIENT)
Dept: FAMILY MEDICINE | Facility: CLINIC | Age: 84
End: 2019-05-02
Payer: MEDICARE

## 2019-05-02 VITALS
WEIGHT: 268 LBS | SYSTOLIC BLOOD PRESSURE: 114 MMHG | HEIGHT: 74 IN | BODY MASS INDEX: 34.39 KG/M2 | OXYGEN SATURATION: 96 % | TEMPERATURE: 97.2 F | DIASTOLIC BLOOD PRESSURE: 60 MMHG | RESPIRATION RATE: 18 BRPM | HEART RATE: 71 BPM

## 2019-05-02 DIAGNOSIS — H26.9 UNSPECIFIED CATARACT: ICD-10-CM

## 2019-05-02 DIAGNOSIS — Z87.39 PERSONAL HISTORY OF OTHER DISEASES OF THE MUSCULOSKELETAL SYSTEM AND CONNECTIVE TISSUE: ICD-10-CM

## 2019-05-02 DIAGNOSIS — N50.811 RIGHT TESTICULAR PAIN: ICD-10-CM

## 2019-05-02 DIAGNOSIS — Z87.898 PERSONAL HISTORY OF OTHER SPECIFIED CONDITIONS: ICD-10-CM

## 2019-05-02 DIAGNOSIS — Z23 ENCOUNTER FOR IMMUNIZATION: ICD-10-CM

## 2019-05-02 PROCEDURE — 99214 OFFICE O/P EST MOD 30 MIN: CPT

## 2019-05-02 NOTE — COUNSELING
[Weight management counseling provided] : Weight management [Healthy eating counseling provided] : healthy eating [Activity counseling provided] : activity [Fall prevention counseling provided] : fall prevention  [Weight Self Once Weekly] : Weight self once weekly [Low Salt Diet] : Low salt diet [Decrease Portions] : Decrease food portions [Keep Food Diary] : Keep food diary [___ min/wk activity recommended] : [unfilled] min/wk activity recommended [None] : None [Walking] : Walking [Good understanding] : Patient has a good understanding of lifestyle changes and the steps needed to achieve self management goals

## 2019-05-02 NOTE — HEALTH RISK ASSESSMENT
[Intercurrent hospitalizations] : was admitted to the hospital  [No falls in past year] : Patient reported no falls in the past year [0] : 1) Little interest or pleasure doing things: Not at all (0) [de-identified] : for replacement AICD [] : No [[id number]] : 0

## 2019-05-02 NOTE — PHYSICAL EXAM
[Well-Appearing] : well-appearing [No Acute Distress] : no acute distress [No JVD] : no jugular venous distention [Supple] : supple [No Respiratory Distress] : no respiratory distress  [Clear to Auscultation] : lungs were clear to auscultation bilaterally [No Accessory Muscle Use] : no accessory muscle use [Normal S1, S2] : normal S1 and S2 [Soft] : abdomen soft [Non Tender] : non-tender [Penis Abnormality] : normal uncircumcised penis [Epididymis] : the epididymides were normal [Testes Tenderness] : no tenderness of the testes [Testes Mass (___cm)] : there were no testicular masses [No CVA Tenderness] : no CVA  tenderness [No Spinal Tenderness] : no spinal tenderness [de-identified] : in NAD AA Ox3 [de-identified] : ventral hernia present [FreeTextEntry1] : in light c/o pain sent sono [de-identified] : decreased ROM right shoulder pain AC joint no skin changes or rashes no lesions noted 5/5 strenght  [de-identified] : antalgic gait 2+ piting edema varicosites lower extremities present

## 2019-05-02 NOTE — HISTORY OF PRESENT ILLNESS
[FreeTextEntry1] : f/u [de-identified] : 85 y/o PMHX CAD CHF dilated cardiomyopathy AICD in place Pre-Dm2 HTN HLD A fib on OAC hypothyroid thrombocytopenia, LUCIUS, [place] CKD here for f/u. Recent cardia evaluation c/o pain at site AICD had it replaced in [facility] outpatient states sent home and now doing better no pain at site and lost 7 lbs since then. Pt denies CP SOB at rest does get SOB on exertion. Pt saw Pulmonary med and placed on medications for it has f/u pulmonary medicine  leg edema persistent  pt states is able to use on of MDI given and albe to walk better but there is one he cannot manage to use gives him cough right away

## 2019-05-02 NOTE — REVIEW OF SYSTEMS
[Lower Ext Edema] : lower extremity edema [Dyspnea on Exertion] : dyspnea on exertion [Joint Stiffness] : joint stiffness [Negative] : Psychiatric [Chest Pain] : no chest pain [Palpitations] : no palpitations [Claudication] : no  leg claudication [Orthopena] : no orthopnea [Paroysmal Nocturnal Dyspnea] : no paroysmal nocturnal dyspnea [Shortness Of Breath] : no shortness of breath [Wheezing] : no wheezing [Cough] : no cough [FreeTextEntry9] : edema legs

## 2019-05-07 ENCOUNTER — LABORATORY RESULT (OUTPATIENT)
Age: 84
End: 2019-05-07

## 2019-05-08 ENCOUNTER — APPOINTMENT (OUTPATIENT)
Dept: CARDIOLOGY | Facility: CLINIC | Age: 84
End: 2019-05-08
Payer: MEDICARE

## 2019-05-08 PROCEDURE — 93289 INTERROG DEVICE EVAL HEART: CPT

## 2019-05-08 NOTE — PROCEDURE
[CRT-D] : Cardiac resynchronization therapy defibrillator [ICD] : Implantable cardioverter-defibrillator [VVIR] : VVIR [Longevity: ___ months] : The estimated remaining battery life is [unfilled] months [Threshold Testing Performed] : Threshold testing was performed [Lead Imp:  ___ohms] : lead impedance was [unfilled] ohms [___V @] : [unfilled] V [___ ms] : [unfilled] ms [None] : none [Pace ___ %] : Pace [unfilled]% [de-identified] : bi v paced [de-identified] : st jud[facility] [de-identified] : 2476 [de-identified] : 10/16/2015 [de-identified] : [address] [de-identified] : normal function\par no therapies\par Given dependence recommended that device be changed\par

## 2019-05-08 NOTE — PROCEDURE
[CRT-D] : Cardiac resynchronization therapy defibrillator [ICD] : Implantable cardioverter-defibrillator [VVIR] : VVIR [Longevity: ___ months] : The estimated remaining battery life is [unfilled] months [Threshold Testing Performed] : Threshold testing was performed [Lead Imp:  ___ohms] : lead impedance was [unfilled] ohms [___V @] : [unfilled] V [___ ms] : [unfilled] ms [None] : none [Pace ___ %] : Pace [unfilled]% [de-identified] : bi v paced [de-identified] : [facility] judes [de-identified] : 0368 [de-identified] : 10/16/2015 [de-identified] : [address] [de-identified] : normal function\par no therapies\par Given dependence recommended that device be changed\par

## 2019-05-09 LAB
ALBUMIN SERPL ELPH-MCNC: 4.4 G/DL
ALP BLD-CCNC: 55 U/L
ALT SERPL-CCNC: 19 U/L
ANION GAP SERPL CALC-SCNC: 10 MMOL/L
AST SERPL-CCNC: 20 U/L
BILIRUB SERPL-MCNC: 0.5 MG/DL
BUN SERPL-MCNC: 20 MG/DL
CALCIUM SERPL-MCNC: 9 MG/DL
CHLORIDE SERPL-SCNC: 105 MMOL/L
CO2 SERPL-SCNC: 30 MMOL/L
CREAT SERPL-MCNC: 1.18 MG/DL
GLUCOSE SERPL-MCNC: 104 MG/DL
POTASSIUM SERPL-SCNC: 4.3 MMOL/L
PROT SERPL-MCNC: 6.6 G/DL
SODIUM SERPL-SCNC: 145 MMOL/L
TSH SERPL-ACNC: 6.73 UIU/ML

## 2019-05-14 ENCOUNTER — APPOINTMENT (OUTPATIENT)
Dept: CARDIOLOGY | Facility: CLINIC | Age: 84
End: 2019-05-14
Payer: MEDICARE

## 2019-05-14 VITALS — OXYGEN SATURATION: 98 % | HEART RATE: 65 BPM

## 2019-05-14 LAB
INR PPP: 2.9 RATIO
POCT-PROTHROMBIN TIME: 34.9 SECS
QUALITY CONTROL: YES

## 2019-05-14 PROCEDURE — 99211 OFF/OP EST MAY X REQ PHY/QHP: CPT | Mod: 25

## 2019-05-14 PROCEDURE — 85610 PROTHROMBIN TIME: CPT | Mod: QW

## 2019-05-15 ENCOUNTER — FORM ENCOUNTER (OUTPATIENT)
Age: 84
End: 2019-05-15

## 2019-05-16 ENCOUNTER — OUTPATIENT (OUTPATIENT)
Dept: OUTPATIENT SERVICES | Facility: HOSPITAL | Age: 84
LOS: 1 days | End: 2019-05-16
Payer: MEDICARE

## 2019-05-16 ENCOUNTER — APPOINTMENT (OUTPATIENT)
Dept: ULTRASOUND IMAGING | Facility: HOSPITAL | Age: 84
End: 2019-05-16
Payer: MEDICARE

## 2019-05-16 DIAGNOSIS — N50.811 RIGHT TESTICULAR PAIN: ICD-10-CM

## 2019-05-16 DIAGNOSIS — Z98.89 OTHER SPECIFIED POSTPROCEDURAL STATES: Chronic | ICD-10-CM

## 2019-05-16 PROCEDURE — 76870 US EXAM SCROTUM: CPT | Mod: 26

## 2019-05-16 PROCEDURE — 76870 US EXAM SCROTUM: CPT

## 2019-06-03 ENCOUNTER — APPOINTMENT (OUTPATIENT)
Age: 84
End: 2019-06-03
Payer: MEDICARE

## 2019-06-03 ENCOUNTER — LABORATORY RESULT (OUTPATIENT)
Age: 84
End: 2019-06-03

## 2019-06-03 VITALS
TEMPERATURE: 98.7 F | SYSTOLIC BLOOD PRESSURE: 145 MMHG | BODY MASS INDEX: 35.68 KG/M2 | HEART RATE: 66 BPM | DIASTOLIC BLOOD PRESSURE: 76 MMHG | HEIGHT: 74 IN | WEIGHT: 278 LBS

## 2019-06-03 LAB
HCT VFR BLD CALC: 36.8 %
HGB BLD-MCNC: 13 G/DL
MCHC RBC-ENTMCNC: 34.2 PG
MCHC RBC-ENTMCNC: 35.3 GM/DL
MCV RBC AUTO: 97 FL
PLATELET # BLD AUTO: 96 K/UL
RBC # BLD: 3.79 M/UL
RBC # FLD: 12 %
WBC # FLD AUTO: 3.7 K/UL

## 2019-06-03 PROCEDURE — 36415 COLL VENOUS BLD VENIPUNCTURE: CPT

## 2019-06-03 PROCEDURE — 85025 COMPLETE CBC W/AUTO DIFF WBC: CPT

## 2019-06-03 PROCEDURE — 99213 OFFICE O/P EST LOW 20 MIN: CPT | Mod: 25

## 2019-06-03 RX ORDER — METHYLPREDNISOLONE 4 MG/1
4 TABLET ORAL
Qty: 1 | Refills: 0 | Status: DISCONTINUED | COMMUNITY
Start: 2019-01-10 | End: 2019-06-03

## 2019-06-03 NOTE — RESULTS/DATA
[FreeTextEntry1] : March 6, 2019:\par WBC 3.5, hemoglobin 12.9 g/ dL, platelet 103,000\par \par I reviewed  today's blood work results with patient\par

## 2019-06-03 NOTE — PHYSICAL EXAM
[Ambulatory and capable of all self care but unable to carry out any work activities] : Status 2- Ambulatory and capable of all self care but unable to carry out any work activities. Up and about more than 50% of waking hours [Obese] : obese [Normal] : normal appearance, no rash, nodules, vesicles, ulcers, erythema [de-identified] : chronic venous stasis changes [de-identified] : bilateral lower extremities edema

## 2019-06-03 NOTE — HISTORY OF PRESENT ILLNESS
[de-identified] : 84 year-old male of [place] descent presenting pancytopenia with macrocytosis since June 2012. [FreeTextEntry1] : \par expectant surveillance,\par - bone marrow studies October 6, 2016. [de-identified] : Returning for biannual followup; last seen in the office in October 2018- patient with persistent neutropenia, mild anemia and thrombocytopenia. Most recent (3/6/19) blood work indicates pancytopenia, with slight improvement in WBC (3.5 K).Bone marrow studies were done in October of 2016 and failed to indicate an underlying myelodysplastic process. He has been under surveillance ever since, and continues to be clinically stable. There has been no changes in appetite or weight. Since last seen patient underwent cataract surgery, with improvement of his vision. Today's BW essentially unchanged compared with March 2019. Accompanied by his wife.

## 2019-06-03 NOTE — REVIEW OF SYSTEMS
[Lower Ext Edema] : lower extremity edema [Fatigue] : fatigue [Joint Pain] : joint pain [SOB on Exertion] : shortness of breath during exertion [Joint Stiffness] : joint stiffness [Difficulty Walking] : difficulty walking [Fever] : no fever [Night Sweats] : no night sweats [Recent Change In Weight] : ~T no recent weight change [Skin Rash] : no skin rash [Confused] : no confusion [Dizziness] : no dizziness [Negative] : Integumentary [FreeTextEntry2] : ambulates with cane

## 2019-06-11 ENCOUNTER — APPOINTMENT (OUTPATIENT)
Dept: CARDIOLOGY | Facility: CLINIC | Age: 84
End: 2019-06-11
Payer: MEDICARE

## 2019-06-11 PROCEDURE — 99211 OFF/OP EST MAY X REQ PHY/QHP: CPT | Mod: 25

## 2019-06-11 PROCEDURE — 85610 PROTHROMBIN TIME: CPT | Mod: QW

## 2019-06-13 ENCOUNTER — MEDICATION RENEWAL (OUTPATIENT)
Age: 84
End: 2019-06-13

## 2019-06-15 ENCOUNTER — MEDICATION RENEWAL (OUTPATIENT)
Age: 84
End: 2019-06-15

## 2019-06-28 ENCOUNTER — APPOINTMENT (OUTPATIENT)
Dept: ELECTROPHYSIOLOGY | Facility: HOSPITAL | Age: 84
End: 2019-06-28
Payer: MEDICARE

## 2019-06-28 PROCEDURE — 93295 DEV INTERROG REMOTE 1/2/MLT: CPT

## 2019-06-28 PROCEDURE — 93296 REM INTERROG EVL PM/IDS: CPT

## 2019-07-05 ENCOUNTER — NON-APPOINTMENT (OUTPATIENT)
Age: 84
End: 2019-07-05

## 2019-07-05 ENCOUNTER — APPOINTMENT (OUTPATIENT)
Dept: CARDIOLOGY | Facility: CLINIC | Age: 84
End: 2019-07-05
Payer: MEDICARE

## 2019-07-05 VITALS
HEIGHT: 74 IN | DIASTOLIC BLOOD PRESSURE: 79 MMHG | SYSTOLIC BLOOD PRESSURE: 121 MMHG | WEIGHT: 279 LBS | RESPIRATION RATE: 17 BRPM | HEART RATE: 60 BPM | BODY MASS INDEX: 35.81 KG/M2 | OXYGEN SATURATION: 95 %

## 2019-07-05 LAB
INR PPP: 2
INR PPP: 2.2 RATIO
POCT-PROTHROMBIN TIME: 26.4 SECS
QUALITY CONTROL: YES

## 2019-07-05 PROCEDURE — 93000 ELECTROCARDIOGRAM COMPLETE: CPT

## 2019-07-05 PROCEDURE — 85610 PROTHROMBIN TIME: CPT | Mod: QW

## 2019-07-05 PROCEDURE — 99213 OFFICE O/P EST LOW 20 MIN: CPT | Mod: 25

## 2019-07-06 NOTE — DISCUSSION/SUMMARY
[Cardiomyopathy] : cardiomyopathy [Coronary Artery Disease] : coronary artery disease [Responding to Treatment] : responding to treatment [Stable] : stable [None] : none

## 2019-07-06 NOTE — PHYSICAL EXAM
[General Appearance - Well Developed] : well developed [Well Groomed] : well groomed [General Appearance - Well Nourished] : well nourished [General Appearance - In No Acute Distress] : no acute distress [Normal Conjunctiva] : the conjunctiva exhibited no abnormalities [Eyelids - No Xanthelasma] : the eyelids demonstrated no xanthelasmas [No Oral Cyanosis] : no oral cyanosis [Normal Oral Mucosa] : normal oral mucosa [Normal Jugular Venous A Waves Present] : normal jugular venous A waves present [No Oral Pallor] : no oral pallor [Normal Jugular Venous V Waves Present] : normal jugular venous V waves present [No Jugular Venous Perez A Waves] : no jugular venous perez A waves [Exaggerated Use Of Accessory Muscles For Inspiration] : no accessory muscle use [Auscultation Breath Sounds / Voice Sounds] : lungs were clear to auscultation bilaterally [Respiration, Rhythm And Depth] : normal respiratory rhythm and effort [Abdomen Tenderness] : non-tender [Edema] : no peripheral edema present [Abdomen Soft] : soft [Abdomen Mass (___ Cm)] : no abdominal mass palpated [] : no rash [No Venous Stasis] : no venous stasis [Skin Color & Pigmentation] : normal skin color and pigmentation [No Skin Ulcers] : no skin ulcer [Skin Lesions] : no skin lesions [No Xanthoma] : no  xanthoma was observed [Oriented To Time, Place, And Person] : oriented to person, place, and time [No Anxiety] : not feeling anxious [Affect] : the affect was normal [Mood] : the mood was normal [Left Infraclavicular] : left infraclavicular area [FreeTextEntry1] : clean protruding wire but no erythema or sings of dermatologic erythema or compromise

## 2019-07-06 NOTE — REVIEW OF SYSTEMS
[Shortness Of Breath] : shortness of breath [Anxiety] : anxiety [Feeling Fatigued] : feeling fatigued [Negative] : Heme/Lymph

## 2019-07-06 NOTE — REASON FOR VISIT
[Cardiomyopathy] : cardiomyopathy [Follow-Up - Clinic] : a clinic follow-up of [FreeTextEntry1] : Jj felt his skin palpitating . It lasted a minute then radiated into his groin and then went away. no cp . he always has sob. but no different.

## 2019-07-09 ENCOUNTER — APPOINTMENT (OUTPATIENT)
Dept: CARDIOLOGY | Facility: CLINIC | Age: 84
End: 2019-07-09

## 2019-07-11 ENCOUNTER — APPOINTMENT (OUTPATIENT)
Dept: CARDIOLOGY | Facility: CLINIC | Age: 84
End: 2019-07-11
Payer: MEDICARE

## 2019-07-11 VITALS — HEART RATE: 60 BPM | SYSTOLIC BLOOD PRESSURE: 114 MMHG | DIASTOLIC BLOOD PRESSURE: 74 MMHG

## 2019-07-11 LAB
INR PPP: 2.2 RATIO
POCT-PROTHROMBIN TIME: 26.9 SECS
QUALITY CONTROL: YES

## 2019-07-11 PROCEDURE — 85610 PROTHROMBIN TIME: CPT | Mod: QW

## 2019-07-11 PROCEDURE — 99211 OFF/OP EST MAY X REQ PHY/QHP: CPT | Mod: 25

## 2019-08-08 ENCOUNTER — APPOINTMENT (OUTPATIENT)
Dept: CARDIOLOGY | Facility: CLINIC | Age: 84
End: 2019-08-08
Payer: MEDICARE

## 2019-08-08 ENCOUNTER — FORM ENCOUNTER (OUTPATIENT)
Age: 84
End: 2019-08-08

## 2019-08-08 LAB
INR PPP: 2.7 RATIO
POCT-PROTHROMBIN TIME: 33 SECS
QUALITY CONTROL: YES

## 2019-08-08 PROCEDURE — 85610 PROTHROMBIN TIME: CPT | Mod: QW

## 2019-08-08 PROCEDURE — 99211 OFF/OP EST MAY X REQ PHY/QHP: CPT | Mod: 25

## 2019-08-09 ENCOUNTER — APPOINTMENT (OUTPATIENT)
Dept: FAMILY MEDICINE | Facility: CLINIC | Age: 84
End: 2019-08-09
Payer: MEDICARE

## 2019-08-09 ENCOUNTER — APPOINTMENT (OUTPATIENT)
Dept: RADIOLOGY | Facility: HOSPITAL | Age: 84
End: 2019-08-09
Payer: MEDICARE

## 2019-08-09 ENCOUNTER — OUTPATIENT (OUTPATIENT)
Dept: OUTPATIENT SERVICES | Facility: HOSPITAL | Age: 84
LOS: 1 days | End: 2019-08-09
Payer: MEDICARE

## 2019-08-09 VITALS
SYSTOLIC BLOOD PRESSURE: 130 MMHG | TEMPERATURE: 98 F | WEIGHT: 277 LBS | HEIGHT: 74 IN | HEART RATE: 61 BPM | RESPIRATION RATE: 16 BRPM | OXYGEN SATURATION: 97 % | DIASTOLIC BLOOD PRESSURE: 70 MMHG | BODY MASS INDEX: 35.55 KG/M2

## 2019-08-09 DIAGNOSIS — Z98.89 OTHER SPECIFIED POSTPROCEDURAL STATES: Chronic | ICD-10-CM

## 2019-08-09 DIAGNOSIS — M25.551 PAIN IN RIGHT HIP: ICD-10-CM

## 2019-08-09 DIAGNOSIS — M79.604 PAIN IN RIGHT LEG: ICD-10-CM

## 2019-08-09 PROCEDURE — 73502 X-RAY EXAM HIP UNI 2-3 VIEWS: CPT

## 2019-08-09 PROCEDURE — 73552 X-RAY EXAM OF FEMUR 2/>: CPT

## 2019-08-09 PROCEDURE — 99213 OFFICE O/P EST LOW 20 MIN: CPT

## 2019-08-09 PROCEDURE — 73552 X-RAY EXAM OF FEMUR 2/>: CPT | Mod: 26,RT

## 2019-08-09 PROCEDURE — 73502 X-RAY EXAM HIP UNI 2-3 VIEWS: CPT | Mod: 26,RT

## 2019-08-12 NOTE — PHYSICAL EXAM
[No Acute Distress] : no acute distress [Well Nourished] : well nourished [Well Developed] : well developed [Well-Appearing] : well-appearing [Thyroid Normal, No Nodules] : the thyroid was normal and there were no nodules present [No Varicosities] : no varicosities [Pedal Pulses Present] : the pedal pulses are present [No Edema] : there was no peripheral edema [Normal Posterior Cervical Nodes] : no posterior cervical lymphadenopathy [Normal Anterior Cervical Nodes] : no anterior cervical lymphadenopathy [No Spinal Tenderness] : no spinal tenderness [No CVA Tenderness] : no CVA  tenderness [Grossly Normal Strength/Tone] : grossly normal strength/tone [No Joint Swelling] : no joint swelling [No Rash] : no rash [Coordination Grossly Intact] : coordination grossly intact [No Focal Deficits] : no focal deficits [Normal Gait] : normal gait [Deep Tendon Reflexes (DTR)] : deep tendon reflexes were 2+ and symmetric [Normal Insight/Judgement] : insight and judgment were intact [Normal Affect] : the affect was normal [de-identified] : R mid anterlateral thigh w/ no skin changes; no edema; no masses; no tenderness to palpation; gait nml

## 2019-08-12 NOTE — HISTORY OF PRESENT ILLNESS
[FreeTextEntry8] : cc: R leg pain x 3 wks\par 83 yo M s/p R hip replacement 20 yrs ago presents w/ R mid anterolateral pain of thigh x 3 wks.\par Pain most noticable when getting up and placing weight on leg.  some radiation superiorly.  no trauma, no falls, no injury.  no fevers, wt loss, or night sweats.\par pain improved w/ rest\par no new symptoms of L leg but has chronic DJD of L knee

## 2019-08-12 NOTE — ASSESSMENT
[FreeTextEntry1] : 83 yo M w/ subacute R thigh pain; no sig physical findings; in view of prior R hip replacement and age check x-rays; if nml consider starting muscle relaxant -methocarbamol

## 2019-09-05 ENCOUNTER — APPOINTMENT (OUTPATIENT)
Dept: FAMILY MEDICINE | Facility: CLINIC | Age: 84
End: 2019-09-05
Payer: MEDICARE

## 2019-09-05 VITALS
DIASTOLIC BLOOD PRESSURE: 70 MMHG | WEIGHT: 275 LBS | RESPIRATION RATE: 15 BRPM | TEMPERATURE: 98 F | BODY MASS INDEX: 35.31 KG/M2 | SYSTOLIC BLOOD PRESSURE: 130 MMHG | HEART RATE: 68 BPM | OXYGEN SATURATION: 95 %

## 2019-09-05 DIAGNOSIS — T82.118A BREAKDOWN (MECHANICAL) OF OTHER CARDIAC ELECTRONIC DEVICE, INITIAL ENCOUNTER: ICD-10-CM

## 2019-09-05 PROCEDURE — G0008: CPT

## 2019-09-05 PROCEDURE — 99214 OFFICE O/P EST MOD 30 MIN: CPT | Mod: 25

## 2019-09-05 PROCEDURE — 90686 IIV4 VACC NO PRSV 0.5 ML IM: CPT

## 2019-09-05 NOTE — HEALTH RISK ASSESSMENT
[Intercurrent hospitalizations] : was admitted to the hospital  [No falls in past year] : Patient reported no falls in the past year [0] : 2) Feeling down, depressed, or hopeless: Not at all (0) [] : No [de-identified] : for replacement AICD [[id number]] : 0

## 2019-09-05 NOTE — COUNSELING
[Use proper foot wear] : Use proper foot wear [Fall prevention counseling provided] : Fall prevention counseling provided [Use recommended devices] : Use recommended devices [Encouraged to maintain food diary] : Encouraged to maintain food diary [Potential consequences of obesity discussed] : Potential consequences of obesity discussed [Benefits of weight loss discussed] : Benefits of weight loss discussed [Encouraged to increase physical activity] : Encouraged to increase physical activity [None] : None [Weigh Self Weekly] : weigh self weekly [Good understanding] : Patient has a good understanding of lifestyle changes and steps needed to achieve self management goal

## 2019-09-05 NOTE — HISTORY OF PRESENT ILLNESS
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 Recent acute visit right hip and femur pain. Pt states pain hip and right shoulder pain much better. pt also edema improving 0-3/10 on scale. when has pain goes to 3 now no pain. pt otherwise states is stable feeling a bit better and able to do all activities as usual does gets SOB when bends down to tie shoes

## 2019-09-05 NOTE — PHYSICAL EXAM
[Normal] : no acute distress, well nourished, well developed and well-appearing [No Accessory Muscle Use] : no accessory muscle use [No JVD] : no jugular venous distention [Clear to Auscultation] : lungs were clear to auscultation bilaterally [Normal S1, S2] : normal S1 and S2 [No CVA Tenderness] : no CVA  tenderness [No Spinal Tenderness] : no spinal tenderness [No Rash] : no rash [de-identified] : obese in NAD [de-identified] : 3+ leg edema much improved from last visit [de-identified] : II AS murmur

## 2019-09-05 NOTE — REVIEW OF SYSTEMS
[Chest Pain] : no chest pain [Palpitations] : no palpitations [Claudication] : no  leg claudication [Lower Ext Edema] : lower extremity edema [Orthopena] : no orthopnea [Shortness Of Breath] : no shortness of breath [Paroysmal Nocturnal Dyspnea] : no paroysmal nocturnal dyspnea [Cough] : no cough [Wheezing] : no wheezing [Dyspnea on Exertion] : dyspnea on exertion [Joint Pain] : joint pain [Joint Stiffness] : joint stiffness [Muscle Pain] : no muscle pain [Muscle Weakness] : no muscle weakness [Joint Swelling] : joint swelling [Back Pain] : back pain [Dizziness] : no dizziness [Headache] : no headache [Fainting] : no fainting [Confusion] : no confusion [Unsteady Walk] : ataxia [Memory Loss] : no memory loss [Negative] : Heme/Lymph

## 2019-09-10 ENCOUNTER — RESULT CHARGE (OUTPATIENT)
Age: 84
End: 2019-09-10

## 2019-09-11 ENCOUNTER — APPOINTMENT (OUTPATIENT)
Dept: CARDIOLOGY | Facility: CLINIC | Age: 84
End: 2019-09-11
Payer: MEDICARE

## 2019-09-11 PROCEDURE — 93289 INTERROG DEVICE EVAL HEART: CPT

## 2019-09-11 PROCEDURE — 85610 PROTHROMBIN TIME: CPT | Mod: QW

## 2019-09-11 PROCEDURE — 99211 OFF/OP EST MAY X REQ PHY/QHP: CPT | Mod: 25

## 2019-09-11 NOTE — PROCEDURE
[ICD] : Implantable cardioverter-defibrillator [VVIR] : VVIR [Longevity: ___ months] : The estimated remaining battery life is [unfilled] months [Threshold Testing Performed] : Threshold testing was performed [___V @] : [unfilled] V [Lead Imp:  ___ohms] : lead impedance was [unfilled] ohms [___ ms] : [unfilled] ms [None] : none [Sense ___ %] : Sense [unfilled]% [Pace ___ %] : Pace [unfilled]% [de-identified] : bi v paced [de-identified] : [facility] [de-identified] : [address] [de-identified] : [address] [de-identified] : 12/11/2018 [de-identified] : normal function\par no therapies\par \par

## 2019-09-12 LAB
INR PPP: 3.2 RATIO
POCT-PROTHROMBIN TIME: 38.5 SECS
QUALITY CONTROL: YES

## 2019-09-30 ENCOUNTER — APPOINTMENT (OUTPATIENT)
Dept: ELECTROPHYSIOLOGY | Facility: CLINIC | Age: 84
End: 2019-09-30
Payer: MEDICARE

## 2019-09-30 PROCEDURE — 93295 DEV INTERROG REMOTE 1/2/MLT: CPT

## 2019-09-30 PROCEDURE — 93296 REM INTERROG EVL PM/IDS: CPT

## 2019-10-02 ENCOUNTER — APPOINTMENT (OUTPATIENT)
Dept: CARDIOLOGY | Facility: CLINIC | Age: 84
End: 2019-10-02
Payer: MEDICARE

## 2019-10-02 LAB
INR PPP: 2.5 RATIO
POCT-PROTHROMBIN TIME: 29.7 SECS
QUALITY CONTROL: YES

## 2019-10-02 PROCEDURE — 99211 OFF/OP EST MAY X REQ PHY/QHP: CPT | Mod: 25

## 2019-10-02 PROCEDURE — 85610 PROTHROMBIN TIME: CPT | Mod: QW

## 2019-10-16 ENCOUNTER — NON-APPOINTMENT (OUTPATIENT)
Age: 84
End: 2019-10-16

## 2019-10-16 ENCOUNTER — APPOINTMENT (OUTPATIENT)
Dept: CARDIOLOGY | Facility: CLINIC | Age: 84
End: 2019-10-16
Payer: MEDICARE

## 2019-10-16 VITALS
HEIGHT: 74 IN | HEART RATE: 60 BPM | SYSTOLIC BLOOD PRESSURE: 116 MMHG | DIASTOLIC BLOOD PRESSURE: 78 MMHG | BODY MASS INDEX: 35.42 KG/M2 | OXYGEN SATURATION: 95 % | RESPIRATION RATE: 16 BRPM | WEIGHT: 276 LBS

## 2019-10-16 PROCEDURE — 99213 OFFICE O/P EST LOW 20 MIN: CPT

## 2019-10-16 PROCEDURE — 93000 ELECTROCARDIOGRAM COMPLETE: CPT

## 2019-10-20 NOTE — DISCUSSION/SUMMARY
[Peripheral Vascular Disease] : peripheral vascular disease [Not Responding to Treatment] : not responding to treatment [INR Therapeutic] : the patient's INR is therapeutic [de-identified] : consider arterila dopplers seeing dr bowser

## 2019-10-20 NOTE — PHYSICAL EXAM
[General Appearance - Well Developed] : well developed [Well Groomed] : well groomed [General Appearance - Well Nourished] : well nourished [General Appearance - In No Acute Distress] : no acute distress [Normal Conjunctiva] : the conjunctiva exhibited no abnormalities [Eyelids - No Xanthelasma] : the eyelids demonstrated no xanthelasmas [No Oral Pallor] : no oral pallor [Normal Oral Mucosa] : normal oral mucosa [No Oral Cyanosis] : no oral cyanosis [Normal Jugular Venous V Waves Present] : normal jugular venous V waves present [Normal Jugular Venous A Waves Present] : normal jugular venous A waves present [No Jugular Venous Perez A Waves] : no jugular venous perez A waves [Respiration, Rhythm And Depth] : normal respiratory rhythm and effort [Auscultation Breath Sounds / Voice Sounds] : lungs were clear to auscultation bilaterally [Edema] : no peripheral edema present [Exaggerated Use Of Accessory Muscles For Inspiration] : no accessory muscle use [Abdomen Tenderness] : non-tender [Abdomen Soft] : soft [Abdomen Mass (___ Cm)] : no abdominal mass palpated [Skin Color & Pigmentation] : normal skin color and pigmentation [Skin Lesions] : no skin lesions [] : no rash [No Venous Stasis] : no venous stasis [No Xanthoma] : no  xanthoma was observed [No Skin Ulcers] : no skin ulcer [Affect] : the affect was normal [No Anxiety] : not feeling anxious [Mood] : the mood was normal [Oriented To Time, Place, And Person] : oriented to person, place, and time [Left Infraclavicular] : left infraclavicular area [FreeTextEntry1] : clean protruding wire but no erythema or sings of dermatologic erythema or compromise

## 2019-10-20 NOTE — REVIEW OF SYSTEMS
[Anxiety] : anxiety [Shortness Of Breath] : shortness of breath [Feeling Fatigued] : feeling fatigued [Negative] : Heme/Lymph

## 2019-10-20 NOTE — CARDIOLOGY SUMMARY
[Fixed Defect] : fixed defect [No Ischemia] : no Ischemia [LVEF ___%] : LVEF [unfilled]% [Moderate] : moderate LV dysfunction [Enlarged] : enlarged LA size [___] : [unfilled]

## 2019-10-20 NOTE — REASON FOR VISIT
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 his  pain ? arthritis he notes some selling and pains in the lower extremities and a vascular evaluation may be indicated.

## 2019-10-28 ENCOUNTER — APPOINTMENT (OUTPATIENT)
Dept: CARDIOLOGY | Facility: CLINIC | Age: 84
End: 2019-10-28
Payer: MEDICARE

## 2019-10-28 PROCEDURE — 85610 PROTHROMBIN TIME: CPT | Mod: QW

## 2019-10-28 PROCEDURE — 99211 OFF/OP EST MAY X REQ PHY/QHP: CPT | Mod: 25

## 2019-10-29 ENCOUNTER — FORM ENCOUNTER (OUTPATIENT)
Age: 84
End: 2019-10-29

## 2019-10-29 ENCOUNTER — APPOINTMENT (OUTPATIENT)
Dept: FAMILY MEDICINE | Facility: CLINIC | Age: 84
End: 2019-10-29
Payer: MEDICARE

## 2019-10-29 VITALS
OXYGEN SATURATION: 96 % | WEIGHT: 279 LBS | BODY MASS INDEX: 35.81 KG/M2 | HEIGHT: 74 IN | SYSTOLIC BLOOD PRESSURE: 140 MMHG | DIASTOLIC BLOOD PRESSURE: 80 MMHG | RESPIRATION RATE: 16 BRPM | TEMPERATURE: 97.4 F | HEART RATE: 60 BPM

## 2019-10-29 DIAGNOSIS — M17.12 UNILATERAL PRIMARY OSTEOARTHRITIS, LEFT KNEE: ICD-10-CM

## 2019-10-29 LAB
INR PPP: 2.7 RATIO
POCT-PROTHROMBIN TIME: 33 SECS
QUALITY CONTROL: YES

## 2019-10-29 PROCEDURE — 99214 OFFICE O/P EST MOD 30 MIN: CPT

## 2019-10-29 RX ORDER — PANTOPRAZOLE 40 MG/1
40 TABLET, DELAYED RELEASE ORAL DAILY
Qty: 90 | Refills: 0 | Status: DISCONTINUED | COMMUNITY
Start: 2019-06-03 | End: 2019-10-29

## 2019-10-29 RX ORDER — RAMIPRIL 2.5 MG/1
2.5 CAPSULE ORAL DAILY
Refills: 0 | Status: DISCONTINUED | COMMUNITY
Start: 2019-06-03 | End: 2019-10-29

## 2019-10-29 RX ORDER — PREDNISONE 20 MG/1
20 TABLET ORAL
Qty: 10 | Refills: 0 | Status: COMPLETED | COMMUNITY
Start: 2019-10-29 | End: 2019-11-03

## 2019-10-30 ENCOUNTER — APPOINTMENT (OUTPATIENT)
Dept: RADIOLOGY | Facility: HOSPITAL | Age: 84
End: 2019-10-30

## 2019-10-30 ENCOUNTER — LABORATORY RESULT (OUTPATIENT)
Age: 84
End: 2019-10-30

## 2019-10-30 ENCOUNTER — OUTPATIENT (OUTPATIENT)
Dept: OUTPATIENT SERVICES | Facility: HOSPITAL | Age: 84
LOS: 1 days | End: 2019-10-30
Payer: MEDICARE

## 2019-10-30 DIAGNOSIS — Z98.89 OTHER SPECIFIED POSTPROCEDURAL STATES: Chronic | ICD-10-CM

## 2019-10-30 DIAGNOSIS — Z00.8 ENCOUNTER FOR OTHER GENERAL EXAMINATION: ICD-10-CM

## 2019-10-30 PROCEDURE — 73562 X-RAY EXAM OF KNEE 3: CPT

## 2019-10-30 PROCEDURE — 73562 X-RAY EXAM OF KNEE 3: CPT | Mod: 26,LT

## 2019-10-30 NOTE — HISTORY OF PRESENT ILLNESS
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 Here for acute visit c/o left knee and left ankle pain worsening. pt notes more swelling bilateral leg not using support stockings  pt no redness no skin rashes no warm to touch no recent trauma or fall no missed step. pt does have sandals he wears and when walks is a bit on side sole pain on scale  6-10 no calf tenderness.

## 2019-10-30 NOTE — HEALTH RISK ASSESSMENT
[Intercurrent hospitalizations] : was admitted to the hospital  [No falls in past year] : Patient reported no falls in the past year [0] : 2) Feeling down, depressed, or hopeless: Not at all (0) [] : No [de-identified] : for replacement AICD [[id number]] : 0

## 2019-10-30 NOTE — ASSESSMENT
[FreeTextEntry1] : leg elevation\par sent xray likely related to side stepping and advance OA obesity\par Aircast to left foot and trial 5 days prednisone on full stomach\par consider PT

## 2019-10-30 NOTE — PHYSICAL EXAM
[No Acute Distress] : no acute distress [Well-Appearing] : well-appearing [No JVD] : no jugular venous distention [No Accessory Muscle Use] : no accessory muscle use [Clear to Auscultation] : lungs were clear to auscultation bilaterally [Normal S1, S2] : normal S1 and S2 [Soft] : abdomen soft [Non Tender] : non-tender [de-identified] : AAOX3 in NAD uses cane to ambulate with limp  [de-identified] : M present IRR [de-identified] : pain and crepitus on ROM left knee no effusion no tapping on patella pain lateral aspect tibia on exam but no bruising or skin changes no calf tenderness  pain also on eversion of left foot lateral malleolus

## 2019-10-30 NOTE — REVIEW OF SYSTEMS
[Fatigue] : fatigue [Lower Ext Edema] : lower extremity edema [Dyspnea on Exertion] : dyspnea on exertion [Joint Pain] : joint pain [Joint Stiffness] : joint stiffness [Unsteady Walk] : ataxia [Easy Bruising] : easy bruising [Negative] : Psychiatric [Fever] : no fever [Chills] : no chills [Hot Flashes] : no hot flashes [Night Sweats] : no night sweats [Recent Change In Weight] : ~T no recent weight change [Chest Pain] : no chest pain [Palpitations] : no palpitations [Orthopena] : no orthopnea [Paroxysmal Nocturnal Dyspnea] : no paroxysmal nocturnal dyspnea [Shortness Of Breath] : no shortness of breath [Wheezing] : no wheezing [Cough] : no cough [Muscle Pain] : no muscle pain [Muscle Weakness] : no muscle weakness [Back Pain] : no back pain [Joint Swelling] : no joint swelling [de-identified] : on OAC

## 2019-11-06 ENCOUNTER — MEDICATION RENEWAL (OUTPATIENT)
Age: 84
End: 2019-11-06

## 2019-11-18 ENCOUNTER — APPOINTMENT (OUTPATIENT)
Dept: CARDIOLOGY | Facility: CLINIC | Age: 84
End: 2019-11-18
Payer: MEDICARE

## 2019-11-18 LAB
INR PPP: 2.8 RATIO
POCT-PROTHROMBIN TIME: 32.4 SECS
QUALITY CONTROL: YES

## 2019-11-18 PROCEDURE — 85610 PROTHROMBIN TIME: CPT | Mod: QW

## 2019-11-18 PROCEDURE — 99211 OFF/OP EST MAY X REQ PHY/QHP: CPT | Mod: 25

## 2019-11-25 ENCOUNTER — APPOINTMENT (OUTPATIENT)
Dept: FAMILY MEDICINE | Facility: CLINIC | Age: 84
End: 2019-11-25
Payer: MEDICARE

## 2019-11-25 VITALS
DIASTOLIC BLOOD PRESSURE: 72 MMHG | HEART RATE: 60 BPM | WEIGHT: 275 LBS | SYSTOLIC BLOOD PRESSURE: 110 MMHG | BODY MASS INDEX: 35.29 KG/M2 | OXYGEN SATURATION: 98 % | RESPIRATION RATE: 15 BRPM | TEMPERATURE: 98 F | HEIGHT: 74 IN

## 2019-11-25 PROCEDURE — 99214 OFFICE O/P EST MOD 30 MIN: CPT

## 2019-11-26 NOTE — ASSESSMENT
[FreeTextEntry1] : Low back pain\par On AC coumadin so to avoid NSAIDS\par Trial of lidoderm patches, muscle relaxants \par Warm compresses\par and Physical therapy

## 2019-11-26 NOTE — COUNSELING
[Fall prevention counseling provided] : Fall prevention counseling provided [Behavioral health counseling provided] : Behavioral health counseling provided [Engage in a relaxing activity] : Engage in a relaxing activity [Good understanding] : Patient has a good understanding of lifestyle changes and steps needed to achieve self management goal [None] : None

## 2019-11-26 NOTE — HEALTH RISK ASSESSMENT
[No] : No [] : No [No falls in past year] : Patient reported no falls in the past year [0] : 1) Little interest or pleasure doing things: Not at all (0)

## 2019-11-26 NOTE — PHYSICAL EXAM
[Well Nourished] : well nourished [No Acute Distress] : no acute distress [Well Developed] : well developed [Well-Appearing] : well-appearing [Normal Sclera/Conjunctiva] : normal sclera/conjunctiva [PERRL] : pupils equal round and reactive to light [Normal Outer Ear/Nose] : the outer ears and nose were normal in appearance [EOMI] : extraocular movements intact [Normal Oropharynx] : the oropharynx was normal [No JVD] : no jugular venous distention [No Lymphadenopathy] : no lymphadenopathy [Supple] : supple [Thyroid Normal, No Nodules] : the thyroid was normal and there were no nodules present [No Accessory Muscle Use] : no accessory muscle use [No Respiratory Distress] : no respiratory distress  [Clear to Auscultation] : lungs were clear to auscultation bilaterally [Normal Rate] : normal rate  [Regular Rhythm] : with a regular rhythm [Normal S1, S2] : normal S1 and S2 [No Murmur] : no murmur heard [No Carotid Bruits] : no carotid bruits [No Abdominal Bruit] : a ~M bruit was not heard ~T in the abdomen [Pedal Pulses Present] : the pedal pulses are present [No Varicosities] : no varicosities [No Palpable Aorta] : no palpable aorta [No Edema] : there was no peripheral edema [No Extremity Clubbing/Cyanosis] : no extremity clubbing/cyanosis [Soft] : abdomen soft [Non Tender] : non-tender [No Masses] : no abdominal mass palpated [No HSM] : no HSM [Non-distended] : non-distended [Normal Posterior Cervical Nodes] : no posterior cervical lymphadenopathy [Normal Bowel Sounds] : normal bowel sounds [Normal Anterior Cervical Nodes] : no anterior cervical lymphadenopathy [No CVA Tenderness] : no CVA  tenderness [No Spinal Tenderness] : no spinal tenderness [No Joint Swelling] : no joint swelling [Grossly Normal Strength/Tone] : grossly normal strength/tone [No Rash] : no rash [Coordination Grossly Intact] : coordination grossly intact [Normal Gait] : normal gait [No Focal Deficits] : no focal deficits [Normal Insight/Judgement] : insight and judgment were intact [Deep Tendon Reflexes (DTR)] : deep tendon reflexes were 2+ and symmetric [Normal Affect] : the affect was normal [de-identified] : PST LS spine, Neg straight leg test.

## 2019-12-06 ENCOUNTER — RX RENEWAL (OUTPATIENT)
Age: 84
End: 2019-12-06

## 2019-12-08 ENCOUNTER — RX RENEWAL (OUTPATIENT)
Age: 84
End: 2019-12-08

## 2019-12-16 ENCOUNTER — APPOINTMENT (OUTPATIENT)
Dept: CARDIOLOGY | Facility: CLINIC | Age: 84
End: 2019-12-16
Payer: MEDICARE

## 2019-12-16 PROCEDURE — 85610 PROTHROMBIN TIME: CPT | Mod: QW

## 2019-12-16 PROCEDURE — 99211 OFF/OP EST MAY X REQ PHY/QHP: CPT | Mod: 25

## 2019-12-18 ENCOUNTER — NON-APPOINTMENT (OUTPATIENT)
Age: 84
End: 2019-12-18

## 2019-12-18 ENCOUNTER — APPOINTMENT (OUTPATIENT)
Dept: CARDIOLOGY | Facility: CLINIC | Age: 84
End: 2019-12-18
Payer: MEDICARE

## 2019-12-18 VITALS
HEIGHT: 74 IN | SYSTOLIC BLOOD PRESSURE: 125 MMHG | WEIGHT: 272 LBS | HEART RATE: 60 BPM | BODY MASS INDEX: 34.91 KG/M2 | OXYGEN SATURATION: 97 % | DIASTOLIC BLOOD PRESSURE: 77 MMHG | RESPIRATION RATE: 16 BRPM

## 2019-12-18 LAB
INR PPP: 3.1 RATIO
POCT-PROTHROMBIN TIME: 37.1 SECS
QUALITY CONTROL: YES

## 2019-12-18 PROCEDURE — 99214 OFFICE O/P EST MOD 30 MIN: CPT

## 2019-12-18 PROCEDURE — 93000 ELECTROCARDIOGRAM COMPLETE: CPT

## 2019-12-23 NOTE — REVIEW OF SYSTEMS
[Shortness Of Breath] : shortness of breath [Feeling Fatigued] : feeling fatigued [Anxiety] : anxiety [Negative] : Neurological

## 2019-12-23 NOTE — DISCUSSION/SUMMARY
[Procedure Low Risk] : the procedure risk is low [Patient Intermediate Risk] : the patient is an intermediate risk [Optimized for Surgery] : the patient is optimized for surgery

## 2019-12-24 NOTE — PHYSICAL EXAM
[General Appearance - Well Developed] : well developed [Well Groomed] : well groomed [General Appearance - Well Nourished] : well nourished [General Appearance - In No Acute Distress] : no acute distress [Eyelids - No Xanthelasma] : the eyelids demonstrated no xanthelasmas [Normal Conjunctiva] : the conjunctiva exhibited no abnormalities [Normal Oral Mucosa] : normal oral mucosa [No Oral Pallor] : no oral pallor [No Oral Cyanosis] : no oral cyanosis [Normal Jugular Venous A Waves Present] : normal jugular venous A waves present [Normal Jugular Venous V Waves Present] : normal jugular venous V waves present [No Jugular Venous Perez A Waves] : no jugular venous perez A waves [Respiration, Rhythm And Depth] : normal respiratory rhythm and effort [Auscultation Breath Sounds / Voice Sounds] : lungs were clear to auscultation bilaterally [Exaggerated Use Of Accessory Muscles For Inspiration] : no accessory muscle use [Edema] : no peripheral edema present [Abdomen Soft] : soft [Abdomen Tenderness] : non-tender [Abdomen Mass (___ Cm)] : no abdominal mass palpated [] : no rash [Skin Color & Pigmentation] : normal skin color and pigmentation [No Skin Ulcers] : no skin ulcer [No Venous Stasis] : no venous stasis [Skin Lesions] : no skin lesions [No Xanthoma] : no  xanthoma was observed [Oriented To Time, Place, And Person] : oriented to person, place, and time [Affect] : the affect was normal [No Anxiety] : not feeling anxious [Mood] : the mood was normal [Left Infraclavicular] : left infraclavicular area [FreeTextEntry1] : clean protruding wire but no erythema or sings of dermatologic erythema or compromise

## 2019-12-24 NOTE — HISTORY OF PRESENT ILLNESS
[Scheduled Procedure ___] : a [unfilled] [Stable] : Stable [Surgeon Name ___] : surgeon: [unfilled] [FreeTextEntry1] : Jj comes today for clarification of  his  overall cardiovascular risk. He was advised to undergo a complete cardiac evaluation. He denies chest pains shortness of breath or loss of consciousness.

## 2019-12-24 NOTE — ASSESSMENT
[FreeTextEntry1] : Without evidence for recent infarction overt heart failure or unstable angina and based upon satisfactory non invasive cardiac testing, Jj undergo planned low risk dental extraction surgery molar at an ASA class II anesthesia risk. If a simple extraction is considered then would recommend that anticoagulants be continued through the perioperative period if acceptable from a dental standpoint. \par \par medical necessity\par this is s high risk encounter based upon the need to manage anticoagulants in the perioperative period with a risk of stroke off medications and bleeding while on medications.

## 2019-12-30 ENCOUNTER — APPOINTMENT (OUTPATIENT)
Dept: ELECTROPHYSIOLOGY | Facility: CLINIC | Age: 84
End: 2019-12-30
Payer: MEDICARE

## 2019-12-30 PROCEDURE — 93296 REM INTERROG EVL PM/IDS: CPT

## 2019-12-30 PROCEDURE — 93295 DEV INTERROG REMOTE 1/2/MLT: CPT

## 2020-01-08 ENCOUNTER — APPOINTMENT (OUTPATIENT)
Dept: CARDIOLOGY | Facility: CLINIC | Age: 85
End: 2020-01-08
Payer: MEDICARE

## 2020-01-08 PROCEDURE — 93289 INTERROG DEVICE EVAL HEART: CPT

## 2020-01-08 NOTE — PROCEDURE
[ICD] : Implantable cardioverter-defibrillator [VVIR] : VVIR [Longevity: ___ months] : The estimated remaining battery life is [unfilled] months [Threshold Testing Performed] : Threshold testing was performed [Lead Imp:  ___ohms] : lead impedance was [unfilled] ohms [___ ms] : [unfilled] ms [___V @] : [unfilled] V [None] : none [Pace ___ %] : Pace [unfilled]% [Sense ___ %] : Sense [unfilled]% [de-identified] : bi v paced [de-identified] : [address] [de-identified] : [facility] [de-identified] : 12/11/2018 [de-identified] : [address] [de-identified] : normal function\par no therapies\par \par

## 2020-01-09 ENCOUNTER — APPOINTMENT (OUTPATIENT)
Dept: FAMILY MEDICINE | Facility: CLINIC | Age: 85
End: 2020-01-09
Payer: MEDICARE

## 2020-01-09 VITALS
DIASTOLIC BLOOD PRESSURE: 80 MMHG | RESPIRATION RATE: 16 BRPM | SYSTOLIC BLOOD PRESSURE: 150 MMHG | BODY MASS INDEX: 35.18 KG/M2 | HEART RATE: 61 BPM | WEIGHT: 274 LBS | TEMPERATURE: 98.4 F | OXYGEN SATURATION: 96 %

## 2020-01-09 DIAGNOSIS — M25.562 PAIN IN LEFT KNEE: ICD-10-CM

## 2020-01-09 PROCEDURE — 99214 OFFICE O/P EST MOD 30 MIN: CPT

## 2020-01-09 NOTE — PHYSICAL EXAM
[No Acute Distress] : no acute distress [Well-Appearing] : well-appearing [Normal] : no respiratory distress, lungs were clear to auscultation bilaterally and no accessory muscle use [Normal S1, S2] : normal S1 and S2 [Soft] : abdomen soft [Non-distended] : non-distended [Non Tender] : non-tender [de-identified] : using  cane to ambulate  [de-identified] : IRR [de-identified] : 2_ leg edema [de-identified] : pain LS Spine at L5-S1 level reproduced negative straight leg raise bialterrally  pain on ROM left knee present as well no swelling of effusion 2+ leg edema bilaterally secondary to venus insufficiency [de-identified] : unstable gait slow paced

## 2020-01-09 NOTE — REVIEW OF SYSTEMS
[Joint Pain] : joint pain [Muscle Pain] : muscle pain [Muscle Weakness] : no muscle weakness [Joint Stiffness] : joint stiffness [Back Pain] : back pain [Joint Swelling] : no joint swelling [Headache] : no headache [Fainting] : no fainting [Dizziness] : no dizziness [Confusion] : no confusion [Unsteady Walk] : ataxia [Memory Loss] : no memory loss [Negative] : Heme/Lymph

## 2020-01-09 NOTE — COUNSELING
[Potential consequences of obesity discussed] : Potential consequences of obesity discussed [Benefits of weight loss discussed] : Benefits of weight loss discussed [Encouraged to maintain food diary] : Encouraged to maintain food diary [Weigh Self Weekly] : weigh self weekly

## 2020-01-09 NOTE — ASSESSMENT
[FreeTextEntry1] : d/w pt continue PT \par repeat muscle relaxant as pt request\par d/w pt stretching in the home as well and ? swimming exercises\par pt's questions answered verbalized understanding

## 2020-01-09 NOTE — HISTORY OF PRESENT ILLNESS
[FreeTextEntry1] : persistent LBP  [de-identified] : Pt here with wife for f/u LBP states PT is helping and medication is complete states still having problems gettign out of the bed has to roll to the side to get up and pain radiates to LLE most of the time. pt denies bowel or bladder issues no saddle anesthesia no recent fall. Pt still pain 3/10 worse in am and states even after OPT pain gets worse but it has gotten easier to ambulate. Pt notes could not even walk when p[ain started. Also notes legs better edema much better in winter months. pt also a lot pain left knee\par pt no CP SOB palpitations or dizziness at present

## 2020-01-13 ENCOUNTER — APPOINTMENT (OUTPATIENT)
Dept: CARDIOLOGY | Facility: CLINIC | Age: 85
End: 2020-01-13
Payer: MEDICARE

## 2020-01-13 VITALS — OXYGEN SATURATION: 97 % | HEART RATE: 60 BPM

## 2020-01-13 LAB
INR PPP: 3.3 RATIO
POCT-PROTHROMBIN TIME: 40 SECS
QUALITY CONTROL: YES

## 2020-01-13 PROCEDURE — 99211 OFF/OP EST MAY X REQ PHY/QHP: CPT | Mod: 25

## 2020-01-13 PROCEDURE — 85610 PROTHROMBIN TIME: CPT | Mod: QW

## 2020-01-27 ENCOUNTER — APPOINTMENT (OUTPATIENT)
Dept: CARDIOLOGY | Facility: CLINIC | Age: 85
End: 2020-01-27
Payer: MEDICARE

## 2020-01-27 LAB
INR PPP: 2.7 RATIO
POCT-PROTHROMBIN TIME: 32.1 SECS
QUALITY CONTROL: YES

## 2020-01-27 PROCEDURE — 85610 PROTHROMBIN TIME: CPT | Mod: QW

## 2020-01-27 PROCEDURE — 99211 OFF/OP EST MAY X REQ PHY/QHP: CPT | Mod: 25

## 2020-01-30 ENCOUNTER — RX RENEWAL (OUTPATIENT)
Age: 85
End: 2020-01-30

## 2020-02-03 ENCOUNTER — APPOINTMENT (OUTPATIENT)
Dept: CARDIOLOGY | Facility: CLINIC | Age: 85
End: 2020-02-03

## 2020-02-10 ENCOUNTER — APPOINTMENT (OUTPATIENT)
Dept: CARDIOLOGY | Facility: CLINIC | Age: 85
End: 2020-02-10
Payer: MEDICARE

## 2020-02-10 LAB
INR PPP: 2.5 RATIO
POCT-PROTHROMBIN TIME: 30 SECS
QUALITY CONTROL: YES

## 2020-02-10 PROCEDURE — 85610 PROTHROMBIN TIME: CPT | Mod: QW

## 2020-02-10 PROCEDURE — 99211 OFF/OP EST MAY X REQ PHY/QHP: CPT | Mod: 25

## 2020-02-17 ENCOUNTER — RX RENEWAL (OUTPATIENT)
Age: 85
End: 2020-02-17

## 2020-03-04 ENCOUNTER — RX RENEWAL (OUTPATIENT)
Age: 85
End: 2020-03-04

## 2020-03-09 ENCOUNTER — APPOINTMENT (OUTPATIENT)
Dept: CARDIOLOGY | Facility: CLINIC | Age: 85
End: 2020-03-09
Payer: MEDICARE

## 2020-03-09 LAB
INR PPP: 1.8 RATIO
POCT-PROTHROMBIN TIME: 22 SECS
QUALITY CONTROL: YES

## 2020-03-09 PROCEDURE — 99211 OFF/OP EST MAY X REQ PHY/QHP: CPT | Mod: 25

## 2020-03-09 PROCEDURE — 85610 PROTHROMBIN TIME: CPT | Mod: QW

## 2020-03-30 ENCOUNTER — APPOINTMENT (OUTPATIENT)
Dept: ELECTROPHYSIOLOGY | Facility: CLINIC | Age: 85
End: 2020-03-30
Payer: MEDICARE

## 2020-03-30 PROCEDURE — 93295 DEV INTERROG REMOTE 1/2/MLT: CPT

## 2020-03-30 PROCEDURE — 93296 REM INTERROG EVL PM/IDS: CPT

## 2020-03-31 ENCOUNTER — APPOINTMENT (OUTPATIENT)
Dept: CARDIOLOGY | Facility: CLINIC | Age: 85
End: 2020-03-31

## 2020-05-13 ENCOUNTER — APPOINTMENT (OUTPATIENT)
Dept: CARDIOLOGY | Facility: CLINIC | Age: 85
End: 2020-05-13
Payer: MEDICARE

## 2020-05-13 PROCEDURE — 99214 OFFICE O/P EST MOD 30 MIN: CPT

## 2020-05-15 NOTE — HISTORY OF PRESENT ILLNESS
[Home] : at home, [unfilled] , at the time of the visit. [Medical Office: ([facility])___] : at the medical office located in  [Spouse] : spouse [FreeTextEntry1] : Jj has shortness of  breath. on two water pills a day. He falls asleep at the drop of a hat. He  may have an element of CO2 narcosis. his weight runs about 267. He calls today for clarification of his       overall cardiovascular risk\par \par We would consider arterial blood gas and sleep apnea study pulmonary  evaluation  consider pulmonary evaluation . He is currently on anticoagulation. His defib will be evaluated based upon COVID constraints. \par \par Jj requests a local MD like Dr. Viera .Would also consider Dr. Dunlap or Dr. Calderón for a telephone consultation. He  may have component of obstructive sleep apnea as well.  [Patient] : the patient

## 2020-05-15 NOTE — HISTORY OF PRESENT ILLNESS
[Home] : at home, [unfilled] , at the time of the visit. [Spouse] : spouse [Medical Office: ([facility])___] : at the medical office located in  [Patient] : the patient [FreeTextEntry1] : Jj has shortness of  breath. on two water pills a day. He falls asleep at the drop of a hat. He  may have an element of CO2 narcosis. his weight runs about 267. He calls today for clarification of his       overall cardiovascular risk\par \par We would consider arterial blood gas and sleep apnea study pulmonary  evaluation  consider pulmonary evaluation . He is currently on anticoagulation. His defib will be evaluated based upon COVID constraints. \par \par Jj requests a local MD like Dr. Viera .Would also consider Dr. Dunlap or Dr. Calderón for a telephone consultation. He  may have component of obstructive sleep apnea as well.

## 2020-05-20 ENCOUNTER — APPOINTMENT (OUTPATIENT)
Dept: CARDIOLOGY | Facility: CLINIC | Age: 85
End: 2020-05-20

## 2020-05-26 LAB — INR PPP: 3.7 RATIO

## 2020-06-04 LAB
INR PPP: 2.5 RATIO
QUALITY CONTROL: YES

## 2020-06-10 ENCOUNTER — APPOINTMENT (OUTPATIENT)
Dept: CARDIOLOGY | Facility: CLINIC | Age: 85
End: 2020-06-10
Payer: MEDICARE

## 2020-06-10 PROCEDURE — 93289 INTERROG DEVICE EVAL HEART: CPT

## 2020-06-10 NOTE — PROCEDURE
[CRT-D] : Cardiac resynchronization therapy defibrillator [VVIR] : VVIR [Longevity: ___ months] : The estimated remaining battery life is [unfilled] months [Threshold Testing Performed] : Threshold testing was performed [Lead Imp:  ___ohms] : lead impedance was [unfilled] ohms [___V @] : [unfilled] V [___ ms] : [unfilled] ms [None] : none [Sense ___ %] : Sense [unfilled]% [Pace ___ %] : Pace [unfilled]% [de-identified] : bi v paced [de-identified] : st judes [de-identified] : 5320 [de-identified] : 12/11/2018 [de-identified] : [address] [de-identified] : normal function\par no therapies\par \par

## 2020-06-12 ENCOUNTER — OUTPATIENT (OUTPATIENT)
Dept: OUTPATIENT SERVICES | Facility: HOSPITAL | Age: 85
LOS: 1 days | End: 2020-06-12
Payer: MEDICARE

## 2020-06-12 ENCOUNTER — APPOINTMENT (OUTPATIENT)
Dept: FAMILY MEDICINE | Facility: CLINIC | Age: 85
End: 2020-06-12
Payer: MEDICARE

## 2020-06-12 ENCOUNTER — APPOINTMENT (OUTPATIENT)
Dept: RADIOLOGY | Facility: HOSPITAL | Age: 85
End: 2020-06-12
Payer: MEDICARE

## 2020-06-12 VITALS
BODY MASS INDEX: 35.05 KG/M2 | HEART RATE: 72 BPM | RESPIRATION RATE: 16 BRPM | TEMPERATURE: 97.3 F | OXYGEN SATURATION: 96 % | DIASTOLIC BLOOD PRESSURE: 80 MMHG | WEIGHT: 273 LBS | SYSTOLIC BLOOD PRESSURE: 150 MMHG

## 2020-06-12 DIAGNOSIS — Z98.89 OTHER SPECIFIED POSTPROCEDURAL STATES: Chronic | ICD-10-CM

## 2020-06-12 DIAGNOSIS — K08.89 OTHER SPECIFIED DISORDERS OF TEETH AND SUPPORTING STRUCTURES: ICD-10-CM

## 2020-06-12 DIAGNOSIS — R22.0 LOCALIZED SWELLING, MASS AND LUMP, HEAD: ICD-10-CM

## 2020-06-12 PROCEDURE — 99214 OFFICE O/P EST MOD 30 MIN: CPT

## 2020-06-12 PROCEDURE — 70360 X-RAY EXAM OF NECK: CPT | Mod: 26

## 2020-06-12 PROCEDURE — 70360 X-RAY EXAM OF NECK: CPT

## 2020-06-14 NOTE — HISTORY OF PRESENT ILLNESS
[FreeTextEntry8] : 89 y/o PMHX CAD A Fib CHF states about 4 weeks ago was cleaning teeth with pick and pinched area gum and has been doing  salt and hydrogen peroxide 3 times a day and not getting better went to dentist had xray nothing done and still swelling in area right lower jaw and pain on and off  no fevers or chills

## 2020-06-14 NOTE — REVIEW OF SYSTEMS
[Earache] : no earache [Hearing Loss] : hearing loss [Nosebleeds] : no nosebleeds [Nasal Discharge] : no nasal discharge [Postnasal Drip] : no postnasal drip [Sore Throat] : sore throat [Hoarseness] : no hoarseness [Chest Pain] : no chest pain [Palpitations] : no palpitations [Lower Ext Edema] : lower extremity edema [Claudication] : no  leg claudication [Paroxysmal Nocturnal Dyspnea] : no paroxysmal nocturnal dyspnea [Orthopena] : no orthopnea [Wheezing] : no wheezing [Shortness Of Breath] : no shortness of breath [Dyspnea on Exertion] : dyspnea on exertion [Cough] : no cough [Unsteady Walk] : ataxia [Negative] : Heme/Lymph [FreeTextEntry4] : mouth pain and discomfort even since used dental metal pick to clean teeth  [FreeTextEntry9] : leg swelling owrsening

## 2020-06-14 NOTE — PHYSICAL EXAM
[Normal Oropharynx] : the oropharynx was normal [Normal S1, S2] : normal S1 and S2 [Normal TMs] : both tympanic membranes were normal [Soft] : abdomen soft [Non Tender] : non-tender [de-identified] : nmo salivary glan enlargement no LAD notes right side anterir neck swelling soft nontender no war, sent xray  [de-identified] : irr [de-identified] : 3+ leg swelling

## 2020-06-22 ENCOUNTER — OUTPATIENT (OUTPATIENT)
Dept: OUTPATIENT SERVICES | Facility: HOSPITAL | Age: 85
LOS: 1 days | Discharge: HOME | End: 2020-06-22

## 2020-06-22 DIAGNOSIS — D64.9 ANEMIA, UNSPECIFIED: ICD-10-CM

## 2020-06-22 DIAGNOSIS — Z98.89 OTHER SPECIFIED POSTPROCEDURAL STATES: Chronic | ICD-10-CM

## 2020-06-23 ENCOUNTER — APPOINTMENT (OUTPATIENT)
Dept: HEMATOLOGY ONCOLOGY | Facility: CLINIC | Age: 85
End: 2020-06-23
Payer: MEDICARE

## 2020-06-23 DIAGNOSIS — Z80.9 FAMILY HISTORY OF MALIGNANT NEOPLASM, UNSPECIFIED: ICD-10-CM

## 2020-06-23 PROCEDURE — 99215 OFFICE O/P EST HI 40 MIN: CPT | Mod: 95

## 2020-06-23 RX ORDER — AMOXICILLIN 500 MG/1
500 CAPSULE ORAL EVERY 8 HOURS
Qty: 30 | Refills: 0 | Status: COMPLETED | COMMUNITY
Start: 2020-06-12 | End: 2020-06-23

## 2020-06-23 NOTE — REVIEW OF SYSTEMS
[Negative] : Heme/Lymph [Lower Ext Edema] : lower extremity edema [Fatigue] : fatigue [SOB on Exertion] : shortness of breath during exertion [Joint Pain] : joint pain [FreeTextEntry9] : especially left knee and ankle

## 2020-06-23 NOTE — CONSULT LETTER
[Courtesy Letter:] : I had the pleasure of seeing your patient, [unfilled], in my office today. [Consult Closing:] : Thank you very much for allowing me to participate in the care of this patient.  If you have any questions, please do not hesitate to contact me. [Please see my note below.] : Please see my note below. [Dear  ___] : Dear  [unfilled], [Sincerely,] : Sincerely, [FreeTextEntry3] : Kyra Alonzo MD

## 2020-06-23 NOTE — HISTORY OF PRESENT ILLNESS
[de-identified] : 2012–Pancytopenia with macrocytosis.\par 10/2016–Bone marrow biopsy and bone marrow aspirate showed a hypercellular bone marrow with trilineage hematopoiesis and maturation, increased iron stores.  Flow cytometry myeloid immunophenotypic findings showed no increase in myeloid immaturity.  The lymphocyte immunophenotypic findings showed T-cell predominance and rare B cells.  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 Alcazar who monitored his CBC with differential. [de-identified] : Telehealth visit due to COVID-19 pandemic.\par This is my initial office visit with patient who is been under the hematologic care/surveillance of Dr. Alcazar.  [place] [facility] [place] His main medical issues have appeared to evolve around his cardiac disease.  He has no current complaints of chest pain, or palpitations.  He has dyspnea on minimal exertion, and gets fatigued easily.  No abnormal bruising or bleeding reported.  No fevers, cough. No abdominal pain. He enjoys a good appetite.

## 2020-06-23 NOTE — PHYSICAL EXAM
[Normal] : affect appropriate [de-identified] : breathing appeared unlabored [de-identified] : coloration appeared normal

## 2020-06-23 NOTE — RESULTS/DATA
[FreeTextEntry1] : 10/2019–hemoglobin 12, hematocrit 37.5, , WBC 7.62 with 59% neutrophils, 11% lymphocytes, 24% monocytes, platelet count 108,000.

## 2020-06-23 NOTE — REASON FOR VISIT
[Home] : at home, [unfilled] , at the time of the visit. [Medical Office: ([facility])___] : at the medical office located in  [Verbal consent obtained from patient] : the patient, [unfilled] [Spouse] : spouse [Follow-Up Visit] : a follow-up visit for [FreeTextEntry2] : pancytopenia

## 2020-06-23 NOTE — ASSESSMENT
[FreeTextEntry1] : Patient with history of pancytopenia with elevated MCV and increased monocyte percentage–discussed with patient and his wife, differential diagnosis.  I am concerned regarding the possibility of an underlying myelodysplastic process/CMML evolving.  Have recommended follow-up lab work to start, including peripheral blood flow cytometry.  Pending this and course, may need to consider follow-up bone marrow evaluation.  Considering patient's advanced age and comorbidities, supportive hematologic care also not unreasonable in this situation currently.\par Patient was given the opportunity to ask questions.  His questions have been answered to his apparent satisfaction at this time.  He is agreeable to recommended follow-up.

## 2020-06-29 LAB
INR PPP: 2.3 RATIO
QUALITY CONTROL: YES

## 2020-06-30 ENCOUNTER — LABORATORY RESULT (OUTPATIENT)
Age: 85
End: 2020-06-30

## 2020-06-30 ENCOUNTER — APPOINTMENT (OUTPATIENT)
Dept: ELECTROPHYSIOLOGY | Facility: CLINIC | Age: 85
End: 2020-06-30
Payer: MEDICARE

## 2020-06-30 LAB
FERRITIN SERPL-MCNC: 181 NG/ML
FOLATE SERPL-MCNC: 7.7 NG/ML
HCYS SERPL-MCNC: 15.5 UMOL/L
IRON SATN MFR SERPL: 23 %
IRON SERPL-MCNC: 63 UG/DL
LDH SERPL-CCNC: 223 U/L
RBC # BLD: 3.48 M/UL
RETICS # AUTO: 1.3 %
RETICS AGGREG/RBC NFR: 45.6 K/UL
TIBC SERPL-MCNC: 274 UG/DL
TSH SERPL-ACNC: 7.2 UIU/ML
UIBC SERPL-MCNC: 211 UG/DL
VIT B12 SERPL-MCNC: 354 PG/ML

## 2020-06-30 PROCEDURE — 93295 DEV INTERROG REMOTE 1/2/MLT: CPT

## 2020-06-30 PROCEDURE — 93296 REM INTERROG EVL PM/IDS: CPT

## 2020-07-01 ENCOUNTER — LABORATORY RESULT (OUTPATIENT)
Age: 85
End: 2020-07-01

## 2020-07-01 LAB
BASOPHILS # BLD AUTO: 0 K/UL
BASOPHILS NFR BLD AUTO: 0 %
EOSINOPHIL # BLD AUTO: 0.03 K/UL
EOSINOPHIL NFR BLD AUTO: 0.9 %
HCT VFR BLD CALC: 35.7 %
HGB BLD-MCNC: 11.1 G/DL
LYMPHOCYTES # BLD AUTO: 0.65 K/UL
LYMPHOCYTES NFR BLD AUTO: 20 %
MAN DIFF?: NORMAL
MCHC RBC-ENTMCNC: 31.1 GM/DL
MCHC RBC-ENTMCNC: 31.9 PG
MCV RBC AUTO: 102.6 FL
MONOCYTES # BLD AUTO: 0.42 K/UL
MONOCYTES NFR BLD AUTO: 13 %
NEUTROPHILS # BLD AUTO: 2.1 K/UL
NEUTROPHILS NFR BLD AUTO: 64.3 %
PLATELET # BLD AUTO: 104 K/UL
RBC # BLD: 3.48 M/UL
RBC # FLD: 13.6 %
WBC # FLD AUTO: 3.26 K/UL

## 2020-07-02 ENCOUNTER — LABORATORY RESULT (OUTPATIENT)
Age: 85
End: 2020-07-02

## 2020-07-03 LAB — METHYLMALONATE SERPL-SCNC: 264 NMOL/L

## 2020-07-07 LAB — T(9;22)(ABL1,BCR)/CONTROL BLD/T: NORMAL

## 2020-07-23 ENCOUNTER — APPOINTMENT (OUTPATIENT)
Dept: CARDIOLOGY | Facility: CLINIC | Age: 85
End: 2020-07-23
Payer: MEDICARE

## 2020-07-23 LAB
INR PPP: 2.1 RATIO
POCT-PROTHROMBIN TIME: 24.6 SECS
QUALITY CONTROL: YES

## 2020-07-23 PROCEDURE — 85610 PROTHROMBIN TIME: CPT | Mod: QW

## 2020-07-23 PROCEDURE — 99211 OFF/OP EST MAY X REQ PHY/QHP: CPT | Mod: 25

## 2020-07-30 ENCOUNTER — OUTPATIENT (OUTPATIENT)
Dept: OUTPATIENT SERVICES | Facility: HOSPITAL | Age: 85
LOS: 1 days | Discharge: HOME | End: 2020-07-30

## 2020-07-30 DIAGNOSIS — D64.9 ANEMIA, UNSPECIFIED: ICD-10-CM

## 2020-07-30 DIAGNOSIS — Z98.89 OTHER SPECIFIED POSTPROCEDURAL STATES: Chronic | ICD-10-CM

## 2020-08-05 ENCOUNTER — APPOINTMENT (OUTPATIENT)
Dept: HEMATOLOGY ONCOLOGY | Facility: CLINIC | Age: 85
End: 2020-08-05
Payer: MEDICARE

## 2020-08-05 PROCEDURE — 99213 OFFICE O/P EST LOW 20 MIN: CPT | Mod: 95

## 2020-08-05 NOTE — REASON FOR VISIT
[Home] : at home, [unfilled] , at the time of the visit. [Medical Office: ([facility])___] : at the medical office located in  [Verbal consent obtained from patient] : the patient, [unfilled] [Follow-Up Visit] : a follow-up visit for [FreeTextEntry2] : pancytopenia

## 2020-08-05 NOTE — HISTORY OF PRESENT ILLNESS
[de-identified] : 2012–Pancytopenia with macrocytosis.\par 10/2016–Bone marrow biopsy and bone marrow aspirate showed a hypercellular bone marrow with trilineage hematopoiesis and maturation, increased iron stores.  Flow cytometry myeloid immunophenotypic findings showed no increase in myeloid immaturity.  The lymphocyte immunophenotypic findings showed T-cell predominance and rare B cells.  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 Alcazar who monitored his CBC with differential.\par 7/2020–Peripheral blood flow cytometry lymphocyte immunophenotypic findings showed no diagnostic abnormalities with lymphopenia.  Myeloid immunophenotypic findings showed normal myeloid granularity with no increase in myeloid immaturity.  No significant absolute lymphocytosis, monocytosis or neutropenia.  MDS FISH panel normal. [de-identified] : Telehealth visit due to COVID-19 pandemic.\par Saw Dr. Ornelas in vascular f/u (sees him yearly) since last visit. Has peripheral edema-not new.\par Patient has no current complaints of chest pain, or palpitations.  No abnormal bruising or bleeding reported.  No fevers, cough. No abdominal pain.

## 2020-08-05 NOTE — PHYSICAL EXAM
[Normal] : affect appropriate [de-identified] : coloration appeared normal [de-identified] : breathing appeared unlabored

## 2020-09-15 ENCOUNTER — APPOINTMENT (OUTPATIENT)
Dept: FAMILY MEDICINE | Facility: CLINIC | Age: 85
End: 2020-09-15
Payer: MEDICARE

## 2020-09-15 VITALS
OXYGEN SATURATION: 97 % | WEIGHT: 270 LBS | HEART RATE: 60 BPM | DIASTOLIC BLOOD PRESSURE: 80 MMHG | BODY MASS INDEX: 34.65 KG/M2 | TEMPERATURE: 96.2 F | SYSTOLIC BLOOD PRESSURE: 140 MMHG | HEIGHT: 74 IN | RESPIRATION RATE: 16 BRPM

## 2020-09-15 DIAGNOSIS — M79.604 PAIN IN RIGHT LEG: ICD-10-CM

## 2020-09-15 DIAGNOSIS — R22.0 LOCALIZED SWELLING, MASS AND LUMP, HEAD: ICD-10-CM

## 2020-09-15 DIAGNOSIS — Z01.810 ENCOUNTER FOR PREPROCEDURAL CARDIOVASCULAR EXAMINATION: ICD-10-CM

## 2020-09-15 DIAGNOSIS — Z45.018 ENCOUNTER FOR ADJUSTMENT AND MANAGEMENT OF OTHER PART OF CARDIAC PACEMAKER: ICD-10-CM

## 2020-09-15 DIAGNOSIS — Z01.818 ENCOUNTER FOR OTHER PREPROCEDURAL EXAMINATION: ICD-10-CM

## 2020-09-15 DIAGNOSIS — Z87.2 PERSONAL HISTORY OF DISEASES OF THE SKIN AND SUBCUTANEOUS TISSUE: ICD-10-CM

## 2020-09-15 DIAGNOSIS — Z85.828 PERSONAL HISTORY OF OTHER MALIGNANT NEOPLASM OF SKIN: ICD-10-CM

## 2020-09-15 DIAGNOSIS — I25.2 OLD MYOCARDIAL INFARCTION: ICD-10-CM

## 2020-09-15 DIAGNOSIS — I87.8 OTHER SPECIFIED DISORDERS OF VEINS: ICD-10-CM

## 2020-09-15 PROCEDURE — 99214 OFFICE O/P EST MOD 30 MIN: CPT | Mod: 25

## 2020-09-15 PROCEDURE — 90662 IIV NO PRSV INCREASED AG IM: CPT

## 2020-09-15 PROCEDURE — G0008: CPT

## 2020-09-15 NOTE — PHYSICAL EXAM
[No Acute Distress] : no acute distress [Well-Appearing] : well-appearing [No Accessory Muscle Use] : no accessory muscle use [Clear to Auscultation] : lungs were clear to auscultation bilaterally [Regular Rhythm] : with a regular rhythm [Normal S1, S2] : normal S1 and S2 [Soft] : abdomen soft [Non Tender] : non-tender [No Joint Swelling] : no joint swelling [de-identified] : mild right costalmargin pain no rashes no swelling no skin changes no CVA tenderness [de-identified] : decreased ROM left knee with crepitus bone ot bone pain lateral aspect ischial band area ankle edema present no instability

## 2020-09-15 NOTE — HISTORY OF PRESENT ILLNESS
[FreeTextEntry1] : f/u Knee pain [de-identified] : 84 y/o PMHX CAD CHF IVD HTN HLD Hypothyroidism chronic leg edema OA knees here for c/o worsening lateral left knee and lateral left ankle pain states is having troubles walking. pt notes did stepped wrong and ever since got pain no swelling no redness Pt notes cannot wear support shoes since cannot find any shoes that fit him. pt wears sandals does not have good support shoes

## 2020-09-15 NOTE — ASSESSMENT
[FreeTextEntry1] : Advised left knee support sleeve with gap or whole on knee area to wear only when walking or active\par air cast to left ankle \par stretching area d/w pt\par pt declined PT referral at this time \par pt agreed to flu vaccination

## 2020-09-15 NOTE — REVIEW OF SYSTEMS
[Chest Pain] : no chest pain [Palpitations] : no palpitations [Claudication] : no  leg claudication [Lower Ext Edema] : lower extremity edema [Orthopena] : no orthopnea [Paroxysmal Nocturnal Dyspnea] : no paroxysmal nocturnal dyspnea [Shortness Of Breath] : no shortness of breath [Wheezing] : no wheezing [Cough] : no cough [Dyspnea on Exertion] : dyspnea on exertion [Joint Pain] : joint pain [Joint Stiffness] : joint stiffness [Muscle Pain] : muscle pain [Muscle Weakness] : no muscle weakness [Back Pain] : back pain [Joint Swelling] : no joint swelling [Headache] : no headache [Dizziness] : no dizziness [Fainting] : no fainting [Confusion] : no confusion [Unsteady Walk] : ataxia [Memory Loss] : no memory loss [Negative] : Psychiatric [FreeTextEntry9] : left knee nad left ankle lateral aspect pain

## 2020-09-28 ENCOUNTER — OUTPATIENT (OUTPATIENT)
Dept: OUTPATIENT SERVICES | Facility: HOSPITAL | Age: 85
LOS: 1 days | End: 2020-09-28
Payer: MEDICARE

## 2020-09-28 ENCOUNTER — LABORATORY RESULT (OUTPATIENT)
Age: 85
End: 2020-09-28

## 2020-09-28 ENCOUNTER — APPOINTMENT (OUTPATIENT)
Dept: RADIOLOGY | Facility: HOSPITAL | Age: 85
End: 2020-09-28
Payer: MEDICARE

## 2020-09-28 DIAGNOSIS — Z98.89 OTHER SPECIFIED POSTPROCEDURAL STATES: Chronic | ICD-10-CM

## 2020-09-28 DIAGNOSIS — Z00.8 ENCOUNTER FOR OTHER GENERAL EXAMINATION: ICD-10-CM

## 2020-09-28 LAB
FOLATE SERPL-MCNC: >20 NG/ML
VIT B12 SERPL-MCNC: 538 PG/ML

## 2020-09-28 PROCEDURE — 73562 X-RAY EXAM OF KNEE 3: CPT | Mod: 26,LT

## 2020-09-28 PROCEDURE — 73562 X-RAY EXAM OF KNEE 3: CPT

## 2020-09-29 ENCOUNTER — APPOINTMENT (OUTPATIENT)
Dept: ELECTROPHYSIOLOGY | Facility: CLINIC | Age: 85
End: 2020-09-29
Payer: MEDICARE

## 2020-09-29 LAB
BASOPHILS # BLD AUTO: 0.04 K/UL
BASOPHILS NFR BLD AUTO: 0.8 %
EOSINOPHIL # BLD AUTO: 0 K/UL
EOSINOPHIL NFR BLD AUTO: 0 %
HCT VFR BLD CALC: 37.9 %
HGB BLD-MCNC: 12.2 G/DL
LYMPHOCYTES # BLD AUTO: 0.77 K/UL
LYMPHOCYTES NFR BLD AUTO: 15.9 %
MAN DIFF?: NORMAL
MCHC RBC-ENTMCNC: 32.2 GM/DL
MCHC RBC-ENTMCNC: 32.6 PG
MCV RBC AUTO: 101.3 FL
MONOCYTES # BLD AUTO: 0.89 K/UL
MONOCYTES NFR BLD AUTO: 18.3 %
NEUTROPHILS # BLD AUTO: 3.17 K/UL
NEUTROPHILS NFR BLD AUTO: 65 %
PLATELET # BLD AUTO: 143 K/UL
RBC # BLD: 3.74 M/UL
RBC # FLD: 13.4 %
WBC # FLD AUTO: 4.87 K/UL

## 2020-09-29 PROCEDURE — 93296 REM INTERROG EVL PM/IDS: CPT

## 2020-09-29 PROCEDURE — 93295 DEV INTERROG REMOTE 1/2/MLT: CPT

## 2020-10-07 ENCOUNTER — RX RENEWAL (OUTPATIENT)
Age: 85
End: 2020-10-07

## 2020-10-26 ENCOUNTER — APPOINTMENT (OUTPATIENT)
Dept: CARDIOLOGY | Facility: CLINIC | Age: 85
End: 2020-10-26
Payer: MEDICARE

## 2020-10-26 LAB
INR PPP: 2.9 RATIO
POCT-PROTHROMBIN TIME: 34.8 SECS
QUALITY CONTROL: YES

## 2020-10-26 PROCEDURE — 85610 PROTHROMBIN TIME: CPT | Mod: QW

## 2020-10-26 PROCEDURE — 99211 OFF/OP EST MAY X REQ PHY/QHP: CPT | Mod: 25

## 2020-11-10 ENCOUNTER — LABORATORY RESULT (OUTPATIENT)
Age: 85
End: 2020-11-10

## 2020-11-11 ENCOUNTER — APPOINTMENT (OUTPATIENT)
Dept: CARDIOLOGY | Facility: CLINIC | Age: 85
End: 2020-11-11
Payer: MEDICARE

## 2020-11-11 PROCEDURE — 93289 INTERROG DEVICE EVAL HEART: CPT

## 2020-11-11 NOTE — PROCEDURE
[CRT-D] : Cardiac resynchronization therapy defibrillator [VVIR] : VVIR [Longevity: ___ months] : The estimated remaining battery life is [unfilled] months [Threshold Testing Performed] : Threshold testing was performed [Lead Imp:  ___ohms] : lead impedance was [unfilled] ohms [___V @] : [unfilled] V [___ ms] : [unfilled] ms [None] : none [Pace ___ %] : Pace [unfilled]% [de-identified] : bi v paced [de-identified] : st judes [de-identified] : 5631-38h [de-identified] : 12/11/2018 [de-identified] : [address] [de-identified] : normal function\par no therapies\par \par

## 2020-11-12 ENCOUNTER — TRANSCRIPTION ENCOUNTER (OUTPATIENT)
Age: 85
End: 2020-11-12

## 2020-11-18 ENCOUNTER — OUTPATIENT (OUTPATIENT)
Dept: OUTPATIENT SERVICES | Facility: HOSPITAL | Age: 85
LOS: 1 days | Discharge: HOME | End: 2020-11-18

## 2020-11-18 DIAGNOSIS — Z98.89 OTHER SPECIFIED POSTPROCEDURAL STATES: Chronic | ICD-10-CM

## 2020-11-18 DIAGNOSIS — D64.9 ANEMIA, UNSPECIFIED: ICD-10-CM

## 2020-11-19 ENCOUNTER — APPOINTMENT (OUTPATIENT)
Dept: HEMATOLOGY ONCOLOGY | Facility: CLINIC | Age: 85
End: 2020-11-19
Payer: MEDICARE

## 2020-11-19 VITALS
RESPIRATION RATE: 17 BRPM | WEIGHT: 277.78 LBS | HEIGHT: 73.98 IN | BODY MASS INDEX: 35.65 KG/M2 | HEART RATE: 65 BPM | OXYGEN SATURATION: 96 % | TEMPERATURE: 97.2 F | SYSTOLIC BLOOD PRESSURE: 160 MMHG | DIASTOLIC BLOOD PRESSURE: 81 MMHG

## 2020-11-19 PROCEDURE — 99213 OFFICE O/P EST LOW 20 MIN: CPT

## 2020-11-19 NOTE — ASSESSMENT
[FreeTextEntry1] : Patient with history of pancytopenia with elevated MCV and increased monocyte percentage–lab work reviewed.\par It remains possible the patient has an underlying myelodysplastic process/CMML.  At this time, no plans for treatment for his cytopenias.  Patient is agreeable to interval follow-up.  Should hematologic scenario worsen/change, then further evaluation with follow-up BMB would be warranted should he wish to pursue this.\par Patient to continue p.o. vitamin B12 supplement for vitamin B-12 level less than 400, and p.o. folic acid (h/o slightly elevated homocysteine level).\par Patient was given the opportunity to ask questions.  His questions have been answered to his apparent satisfaction at this time.

## 2020-11-19 NOTE — HISTORY OF PRESENT ILLNESS
[de-identified] : 2012–Pancytopenia with macrocytosis.\par 10/2016–Bone marrow biopsy and bone marrow aspirate showed a hypercellular bone marrow with trilineage hematopoiesis and maturation, increased iron stores.  Flow cytometry myeloid immunophenotypic findings showed no increase in myeloid immaturity.  The lymphocyte immunophenotypic findings showed T-cell predominance and rare B cells.  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 Alcazar who monitored his CBC with differential.\par 7/2020–Peripheral blood flow cytometry lymphocyte immunophenotypic findings showed no diagnostic abnormalities with lymphopenia.  Myeloid immunophenotypic findings showed normal myeloid granularity with no increase in myeloid immaturity.  No significant absolute lymphocytosis, monocytosis or neutropenia.  MDS FISH panel normal. [de-identified] : Getting gel injections for left knee arthritis.\par Has chronic peripheral edema. Takes diuretic for this with mild relief.\par Patient has no current complaints of chest pain, or palpitations.  No abnormal bruising or bleeding reported.  No fevers, cough. No abdominal pain.

## 2020-11-19 NOTE — PHYSICAL EXAM
[Normal] : affect appropriate [de-identified] : B/L LE edema; no calf tenderness; support stockings in place

## 2020-11-24 ENCOUNTER — APPOINTMENT (OUTPATIENT)
Dept: CARDIOLOGY | Facility: CLINIC | Age: 85
End: 2020-11-24
Payer: MEDICARE

## 2020-11-24 LAB
INR PPP: 2.4 RATIO
POCT-PROTHROMBIN TIME: 28.7 SECS
QUALITY CONTROL: YES

## 2020-11-24 PROCEDURE — 99211 OFF/OP EST MAY X REQ PHY/QHP: CPT | Mod: 25

## 2020-11-24 PROCEDURE — 85610 PROTHROMBIN TIME: CPT | Mod: QW

## 2020-11-27 ENCOUNTER — TRANSCRIPTION ENCOUNTER (OUTPATIENT)
Age: 85
End: 2020-11-27

## 2020-12-22 ENCOUNTER — APPOINTMENT (OUTPATIENT)
Dept: CARDIOLOGY | Facility: CLINIC | Age: 85
End: 2020-12-22
Payer: MEDICARE

## 2020-12-22 LAB
INR PPP: 1.6 RATIO
POCT-PROTHROMBIN TIME: 19.6 SECS
QUALITY CONTROL: YES

## 2020-12-22 PROCEDURE — 99211 OFF/OP EST MAY X REQ PHY/QHP: CPT | Mod: 25

## 2020-12-22 PROCEDURE — 85610 PROTHROMBIN TIME: CPT | Mod: QW

## 2021-01-14 ENCOUNTER — NON-APPOINTMENT (OUTPATIENT)
Age: 86
End: 2021-01-14

## 2021-01-20 ENCOUNTER — APPOINTMENT (OUTPATIENT)
Dept: CARDIOLOGY | Facility: CLINIC | Age: 86
End: 2021-01-20
Payer: MEDICARE

## 2021-01-20 ENCOUNTER — NON-APPOINTMENT (OUTPATIENT)
Age: 86
End: 2021-01-20

## 2021-01-20 VITALS
WEIGHT: 276 LBS | HEART RATE: 79 BPM | TEMPERATURE: 97.6 F | HEIGHT: 73 IN | RESPIRATION RATE: 17 BRPM | BODY MASS INDEX: 36.58 KG/M2 | OXYGEN SATURATION: 96 % | SYSTOLIC BLOOD PRESSURE: 104 MMHG | DIASTOLIC BLOOD PRESSURE: 67 MMHG

## 2021-01-20 LAB
INR PPP: 1.8 RATIO
POCT-PROTHROMBIN TIME: 22.2 SECS
QUALITY CONTROL: YES

## 2021-01-20 PROCEDURE — 85610 PROTHROMBIN TIME: CPT | Mod: QW

## 2021-01-20 PROCEDURE — 99213 OFFICE O/P EST LOW 20 MIN: CPT

## 2021-01-20 PROCEDURE — 93000 ELECTROCARDIOGRAM COMPLETE: CPT

## 2021-01-21 NOTE — PHYSICAL EXAM
[General Appearance - Well Developed] : well developed [Well Groomed] : well groomed [General Appearance - Well Nourished] : well nourished [General Appearance - In No Acute Distress] : no acute distress [Normal Conjunctiva] : the conjunctiva exhibited no abnormalities [Normal Oral Mucosa] : normal oral mucosa [Eyelids - No Xanthelasma] : the eyelids demonstrated no xanthelasmas [No Oral Pallor] : no oral pallor [No Oral Cyanosis] : no oral cyanosis [Normal Jugular Venous A Waves Present] : normal jugular venous A waves present [Normal Jugular Venous V Waves Present] : normal jugular venous V waves present [No Jugular Venous Perez A Waves] : no jugular venous perez A waves [Respiration, Rhythm And Depth] : normal respiratory rhythm and effort [Exaggerated Use Of Accessory Muscles For Inspiration] : no accessory muscle use [Auscultation Breath Sounds / Voice Sounds] : lungs were clear to auscultation bilaterally [Edema] : no peripheral edema present [Abdomen Soft] : soft [Abdomen Tenderness] : non-tender [Abdomen Mass (___ Cm)] : no abdominal mass palpated [Skin Color & Pigmentation] : normal skin color and pigmentation [] : no rash [No Venous Stasis] : no venous stasis [Skin Lesions] : no skin lesions [No Skin Ulcers] : no skin ulcer [No Xanthoma] : no  xanthoma was observed [Oriented To Time, Place, And Person] : oriented to person, place, and time [Affect] : the affect was normal [Mood] : the mood was normal [No Anxiety] : not feeling anxious [Left Infraclavicular] : left infraclavicular area [FreeTextEntry1] : clean protruding wire but no erythema or sings of dermatologic erythema or compromise

## 2021-01-21 NOTE — DISCUSSION/SUMMARY
[INR Therapeutic] : the patient's INR is therapeutic [Cardiomyopathy] : cardiomyopathy [Responding to Treatment] : responding to treatment [Coronary Artery Disease] : coronary artery disease [Stable] : stable

## 2021-01-21 NOTE — REASON FOR VISIT
[Follow-Up - Clinic] : a clinic follow-up of [Anticoagulation] : anticoagulation [Atrial Fibrillation] : atrial fibrillation [Coronary Artery Disease] : coronary artery disease [FreeTextEntry1] : james has mostly fatigue but no new cardiac symptoms.

## 2021-01-27 ENCOUNTER — LABORATORY RESULT (OUTPATIENT)
Age: 86
End: 2021-01-27

## 2021-01-27 LAB
ALBUMIN SERPL ELPH-MCNC: 4.7 G/DL
ALP BLD-CCNC: 53 U/L
ALT SERPL-CCNC: 14 U/L
ANION GAP SERPL CALC-SCNC: 12 MMOL/L
AST SERPL-CCNC: 16 U/L
BASOPHILS # BLD AUTO: 0.03 K/UL
BASOPHILS NFR BLD AUTO: 0.9 %
BILIRUB SERPL-MCNC: 0.5 MG/DL
BUN SERPL-MCNC: 23 MG/DL
CALCIUM SERPL-MCNC: 9.2 MG/DL
CHLORIDE SERPL-SCNC: 103 MMOL/L
CO2 SERPL-SCNC: 27 MMOL/L
CREAT SERPL-MCNC: 1.46 MG/DL
EOSINOPHIL # BLD AUTO: 0.03 K/UL
EOSINOPHIL NFR BLD AUTO: 0.9 %
GLUCOSE SERPL-MCNC: 97 MG/DL
HCT VFR BLD CALC: 37.9 %
HGB BLD-MCNC: 12.2 G/DL
LYMPHOCYTES # BLD AUTO: 0.66 K/UL
LYMPHOCYTES NFR BLD AUTO: 18.4 %
MAN DIFF?: NORMAL
MCHC RBC-ENTMCNC: 32.2 GM/DL
MCHC RBC-ENTMCNC: 33.5 PG
MCV RBC AUTO: 104.1 FL
MONOCYTES # BLD AUTO: 0.44 K/UL
MONOCYTES NFR BLD AUTO: 12.3 %
NEUTROPHILS # BLD AUTO: 2.42 K/UL
NEUTROPHILS NFR BLD AUTO: 67.5 %
PLATELET # BLD AUTO: 120 K/UL
POTASSIUM SERPL-SCNC: 4.4 MMOL/L
PROT SERPL-MCNC: 7 G/DL
RBC # BLD: 3.64 M/UL
RBC # FLD: 12.5 %
SODIUM SERPL-SCNC: 143 MMOL/L
TSH SERPL-ACNC: 10.5 UIU/ML
WBC # FLD AUTO: 3.58 K/UL

## 2021-01-28 ENCOUNTER — NON-APPOINTMENT (OUTPATIENT)
Age: 86
End: 2021-01-28

## 2021-01-28 LAB
INR PPP: 2.22 RATIO
PT BLD: 25.2 SEC

## 2021-02-06 ENCOUNTER — OUTPATIENT (OUTPATIENT)
Dept: OUTPATIENT SERVICES | Facility: HOSPITAL | Age: 86
LOS: 1 days | Discharge: HOME | End: 2021-02-06

## 2021-02-06 DIAGNOSIS — D64.9 ANEMIA, UNSPECIFIED: ICD-10-CM

## 2021-02-06 DIAGNOSIS — Z98.89 OTHER SPECIFIED POSTPROCEDURAL STATES: Chronic | ICD-10-CM

## 2021-02-08 ENCOUNTER — LABORATORY RESULT (OUTPATIENT)
Age: 86
End: 2021-02-08

## 2021-02-09 LAB
BASOPHILS # BLD AUTO: 0 K/UL
BASOPHILS NFR BLD AUTO: 0 %
EOSINOPHIL # BLD AUTO: 0.06 K/UL
EOSINOPHIL NFR BLD AUTO: 1.7 %
FERRITIN SERPL-MCNC: 149 NG/ML
FOLATE SERPL-MCNC: >20 NG/ML
HAPTOGLOB SERPL-MCNC: 191 MG/DL
HCT VFR BLD CALC: 36.7 %
HGB BLD-MCNC: 12 G/DL
IRON SATN MFR SERPL: 27 %
IRON SERPL-MCNC: 79 UG/DL
LYMPHOCYTES # BLD AUTO: 0.56 K/UL
LYMPHOCYTES NFR BLD AUTO: 14.8 %
MAN DIFF?: NORMAL
MCHC RBC-ENTMCNC: 32.7 GM/DL
MCHC RBC-ENTMCNC: 33.3 PG
MCV RBC AUTO: 101.9 FL
MONOCYTES # BLD AUTO: 0.82 K/UL
MONOCYTES NFR BLD AUTO: 21.7 %
NEUTROPHILS # BLD AUTO: 2.19 K/UL
NEUTROPHILS NFR BLD AUTO: 58.3 %
PLATELET # BLD AUTO: 130 K/UL
RBC # BLD: 3.6 M/UL
RBC # BLD: 3.6 M/UL
RBC # FLD: 12.4 %
RETICS # AUTO: 1.4 %
RETICS AGGREG/RBC NFR: 50.4 K/UL
TIBC SERPL-MCNC: 290 UG/DL
UIBC SERPL-MCNC: 211 UG/DL
VIT B12 SERPL-MCNC: 592 PG/ML
WBC # FLD AUTO: 3.76 K/UL

## 2021-02-11 ENCOUNTER — APPOINTMENT (OUTPATIENT)
Dept: HEMATOLOGY ONCOLOGY | Facility: CLINIC | Age: 86
End: 2021-02-11
Payer: MEDICARE

## 2021-02-11 PROCEDURE — 99213 OFFICE O/P EST LOW 20 MIN: CPT | Mod: 95

## 2021-02-11 NOTE — HISTORY OF PRESENT ILLNESS
[de-identified] : 2012–Pancytopenia with macrocytosis.\par 10/2016–Bone marrow biopsy and bone marrow aspirate showed a hypercellular bone marrow with trilineage hematopoiesis and maturation, increased iron stores.  Flow cytometry myeloid immunophenotypic findings showed no increase in myeloid immaturity.  The lymphocyte immunophenotypic findings showed T-cell predominance and rare B cells.  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 Alcazar who monitored his CBC with differential.\par 7/2020–Peripheral blood flow cytometry lymphocyte immunophenotypic findings showed no diagnostic abnormalities with lymphopenia.  Myeloid immunophenotypic findings showed normal myeloid granularity with no increase in myeloid immaturity.  No significant absolute lymphocytosis, monocytosis or neutropenia.  MDS FISH panel normal. [de-identified] : Telehealth visit due to COVID pandemic.\par Saw Dr. Razo (cardiology) in f/u recently-Synthroid increased. Coumadin being adjusted. No bleeding.\par Has chronic peripheral edema-no acute change.\par No fevers, cough. No abdominal pain. No current c/o CP or SOB.

## 2021-02-11 NOTE — CONSULT LETTER
[Dear  ___] : Dear  [unfilled], [Courtesy Letter:] : I had the pleasure of seeing your patient, [unfilled], in my office today. [Please see my note below.] : Please see my note below. [Consult Closing:] : Thank you very much for allowing me to participate in the care of this patient.  If you have any questions, please do not hesitate to contact me. [Sincerely,] : Sincerely, [FreeTextEntry3] : Kyra Alonzo MD

## 2021-02-11 NOTE — REASON FOR VISIT
[Home] : at home, [unfilled] , at the time of the visit. [Medical Office: ([facility])___] : at the medical office located in  [Spouse] : spouse [Follow-Up Visit] : a follow-up visit for [Verbal consent obtained from patient] : the patient, [unfilled] [FreeTextEntry2] : pancytopenia

## 2021-02-11 NOTE — PHYSICAL EXAM
[Normal] : affect appropriate [de-identified] : breathing appeared unlabored [de-identified] : coloration appeared normal

## 2021-02-11 NOTE — ASSESSMENT
[FreeTextEntry1] : Patient with history of pancytopenia with elevated MCV and increased monocyte percentage–recent lab work reviewed with him. ANC, hemoglobin, platelet count acceptable currently.\par It remains possible the patient has an underlying myelodysplastic process/CMML.  At this time, no plans for treatment for his cytopenias.  Patient is agreeable to interval follow-up.  Should hematologic scenario worsen/change, then further evaluation with follow-up BMB would be warranted should he wish to pursue this.\par Patient to continue p.o. vitamin B12 supplement for vitamin B-12 level less than 400, and p.o. folic acid (h/o slightly elevated homocysteine level).\par \par Patient was given the opportunity to ask questions.  His questions have been answered to his apparent satisfaction at this time.

## 2021-02-15 NOTE — ASU PREOP CHECKLIST - SURGICAL CONSENT
Patient states she came to the office 01/25 and gave Dr. Susanne Plascencia forms to complete and send to her insurance before 01/31/21. Insurance has called and they have not received anything.  Patient requesting forms to be completed and sent as soon as possible, in done

## 2021-02-17 ENCOUNTER — APPOINTMENT (OUTPATIENT)
Dept: CARDIOLOGY | Facility: CLINIC | Age: 86
End: 2021-02-17
Payer: MEDICARE

## 2021-02-17 LAB
INR PPP: 2.3 RATIO
POCT-PROTHROMBIN TIME: 21.3 SECS
QUALITY CONTROL: YES

## 2021-02-17 PROCEDURE — 99211 OFF/OP EST MAY X REQ PHY/QHP: CPT | Mod: 25

## 2021-02-17 PROCEDURE — 85610 PROTHROMBIN TIME: CPT | Mod: QW

## 2021-02-18 ENCOUNTER — APPOINTMENT (OUTPATIENT)
Dept: CARDIOLOGY | Facility: CLINIC | Age: 86
End: 2021-02-18

## 2021-02-20 ENCOUNTER — RX RENEWAL (OUTPATIENT)
Age: 86
End: 2021-02-20

## 2021-03-10 ENCOUNTER — APPOINTMENT (OUTPATIENT)
Dept: CARDIOLOGY | Facility: CLINIC | Age: 86
End: 2021-03-10
Payer: MEDICARE

## 2021-03-10 PROCEDURE — 93289 INTERROG DEVICE EVAL HEART: CPT

## 2021-03-10 NOTE — PROCEDURE
[CRT-D] : Cardiac resynchronization therapy defibrillator [VVIR] : VVIR [Longevity: ___ months] : The estimated remaining battery life is [unfilled] months [Threshold Testing Performed] : Threshold testing was performed [Lead Imp:  ___ohms] : lead impedance was [unfilled] ohms [___V @] : [unfilled] V [___ ms] : [unfilled] ms [None] : none [Pace ___ %] : Pace [unfilled]% [de-identified] : bi v paced [de-identified] : st judes [de-identified] : 2946-86i [de-identified] : 12/11/2018 [de-identified] : [address] [de-identified] : gretchen lead present working well no evidence of insulation problem\par \par no therapies\par normal function\par \par

## 2021-03-16 ENCOUNTER — APPOINTMENT (OUTPATIENT)
Dept: CARDIOLOGY | Facility: CLINIC | Age: 86
End: 2021-03-16
Payer: MEDICARE

## 2021-03-16 LAB
INR PPP: 2.7 RATIO
POCT-PROTHROMBIN TIME: 33 SECS
QUALITY CONTROL: YES

## 2021-03-16 PROCEDURE — 99211 OFF/OP EST MAY X REQ PHY/QHP: CPT | Mod: 25

## 2021-03-16 PROCEDURE — 85610 PROTHROMBIN TIME: CPT | Mod: QW

## 2021-03-23 ENCOUNTER — APPOINTMENT (OUTPATIENT)
Dept: FAMILY MEDICINE | Facility: CLINIC | Age: 86
End: 2021-03-23
Payer: MEDICARE

## 2021-03-23 VITALS
BODY MASS INDEX: 37.24 KG/M2 | TEMPERATURE: 97 F | WEIGHT: 281 LBS | HEIGHT: 73 IN | DIASTOLIC BLOOD PRESSURE: 64 MMHG | SYSTOLIC BLOOD PRESSURE: 120 MMHG | RESPIRATION RATE: 18 BRPM | OXYGEN SATURATION: 95 % | HEART RATE: 60 BPM

## 2021-03-23 DIAGNOSIS — M75.01 ADHESIVE CAPSULITIS OF RIGHT SHOULDER: ICD-10-CM

## 2021-03-23 PROCEDURE — 99212 OFFICE O/P EST SF 10 MIN: CPT

## 2021-03-25 NOTE — HISTORY OF PRESENT ILLNESS
[FreeTextEntry8] : 87 y/o PMHX CHF CAD A fib AICD in place obesity Sammi stasis here with c/o one week swelling left elbow and pain took Tylenol doing better now believes is from sitting in his desk and resting elbow in side area. pt no redness no rashes no trauma  Also notes woke up this AM with pain upper C spine now better as well. \par \par  notes got both COVID vaccines already

## 2021-03-25 NOTE — DATA REVIEWED
[No studies available for review at this time.] : No studies available for review at this time. [FreeTextEntry1] : icing area d/w pt and ROM C Spine as well \par no medication at this time\par continue Tylenol prn

## 2021-03-25 NOTE — REVIEW OF SYSTEMS
[Dyspnea on Exertion] : dyspnea on exertion [Joint Pain] : joint pain [Joint Stiffness] : joint stiffness [Back Pain] : back pain [Joint Swelling] : joint swelling [Unsteady Walk] : ataxia [Negative] : Integumentary [FreeTextEntry9] : swelling left elbow  C spine pain  [de-identified] : uses cane to ambulate

## 2021-03-25 NOTE — PHYSICAL EXAM
[Normal] : no respiratory distress, lungs were clear to auscultation bilaterally and no accessory muscle use [Normal S1, S2] : normal S1 and S2 [de-identified] : IRR [de-identified] : soft effusion on left elbow mobile no skin rashes redness not warm to touch advised symptomatic treatment elevation  c spine no lesions decreased ROM mild pain

## 2021-03-30 ENCOUNTER — APPOINTMENT (OUTPATIENT)
Dept: ELECTROPHYSIOLOGY | Facility: CLINIC | Age: 86
End: 2021-03-30
Payer: MEDICARE

## 2021-03-30 ENCOUNTER — NON-APPOINTMENT (OUTPATIENT)
Age: 86
End: 2021-03-30

## 2021-03-30 PROCEDURE — 93295 DEV INTERROG REMOTE 1/2/MLT: CPT

## 2021-03-30 PROCEDURE — 93296 REM INTERROG EVL PM/IDS: CPT

## 2021-04-12 ENCOUNTER — TRANSCRIPTION ENCOUNTER (OUTPATIENT)
Age: 86
End: 2021-04-12

## 2021-04-12 ENCOUNTER — RX RENEWAL (OUTPATIENT)
Age: 86
End: 2021-04-12

## 2021-04-13 ENCOUNTER — APPOINTMENT (OUTPATIENT)
Dept: CARDIOLOGY | Facility: CLINIC | Age: 86
End: 2021-04-13
Payer: MEDICARE

## 2021-04-13 PROCEDURE — 99211 OFF/OP EST MAY X REQ PHY/QHP: CPT | Mod: 25

## 2021-04-13 PROCEDURE — 85610 PROTHROMBIN TIME: CPT | Mod: QW

## 2021-04-14 LAB
INR PPP: 2.1 RATIO
POCT-PROTHROMBIN TIME: 25.2 SECS
QUALITY CONTROL: YES

## 2021-04-16 ENCOUNTER — APPOINTMENT (OUTPATIENT)
Dept: PULMONOLOGY | Facility: CLINIC | Age: 86
End: 2021-04-16
Payer: MEDICARE

## 2021-04-16 VITALS
TEMPERATURE: 97.9 F | HEIGHT: 71.65 IN | BODY MASS INDEX: 37.38 KG/M2 | HEART RATE: 60 BPM | OXYGEN SATURATION: 97 % | WEIGHT: 273 LBS

## 2021-04-16 PROCEDURE — 94010 BREATHING CAPACITY TEST: CPT

## 2021-04-16 PROCEDURE — 94726 PLETHYSMOGRAPHY LUNG VOLUMES: CPT

## 2021-04-16 PROCEDURE — 94729 DIFFUSING CAPACITY: CPT

## 2021-04-21 ENCOUNTER — NON-APPOINTMENT (OUTPATIENT)
Age: 86
End: 2021-04-21

## 2021-04-21 ENCOUNTER — OUTPATIENT (OUTPATIENT)
Dept: OUTPATIENT SERVICES | Facility: HOSPITAL | Age: 86
LOS: 1 days | End: 2021-04-21
Payer: MEDICARE

## 2021-04-21 ENCOUNTER — APPOINTMENT (OUTPATIENT)
Dept: RADIOLOGY | Facility: HOSPITAL | Age: 86
End: 2021-04-21

## 2021-04-21 ENCOUNTER — APPOINTMENT (OUTPATIENT)
Dept: CARDIOLOGY | Facility: CLINIC | Age: 86
End: 2021-04-21
Payer: MEDICARE

## 2021-04-21 VITALS
BODY MASS INDEX: 39.06 KG/M2 | WEIGHT: 279 LBS | SYSTOLIC BLOOD PRESSURE: 126 MMHG | RESPIRATION RATE: 16 BRPM | HEART RATE: 60 BPM | OXYGEN SATURATION: 97 % | DIASTOLIC BLOOD PRESSURE: 70 MMHG | TEMPERATURE: 97.6 F | HEIGHT: 71 IN

## 2021-04-21 DIAGNOSIS — R06.00 DYSPNEA, UNSPECIFIED: ICD-10-CM

## 2021-04-21 DIAGNOSIS — Z98.89 OTHER SPECIFIED POSTPROCEDURAL STATES: Chronic | ICD-10-CM

## 2021-04-21 PROCEDURE — 71046 X-RAY EXAM CHEST 2 VIEWS: CPT

## 2021-04-21 PROCEDURE — 93000 ELECTROCARDIOGRAM COMPLETE: CPT

## 2021-04-21 PROCEDURE — 99214 OFFICE O/P EST MOD 30 MIN: CPT

## 2021-04-21 PROCEDURE — 71046 X-RAY EXAM CHEST 2 VIEWS: CPT | Mod: 26

## 2021-04-26 NOTE — HISTORY OF PRESENT ILLNESS
[FreeTextEntry1] : Jj complains mostly with complaints of edema the legs is seen by Dr. Ornelas August 20 and sonography has failed to reveal evidence for deep vein thrombosis

## 2021-04-26 NOTE — ASSESSMENT
[FreeTextEntry1] :  x-ray and echo are considered in order to assess for heart failure and all the dysfunction continual leg elevation as indicated

## 2021-04-26 NOTE — PHYSICAL EXAM
[Well Developed] : well developed [Well Nourished] : well nourished [No Acute Distress] : no acute distress [Normal Venous Pressure] : normal venous pressure [No Carotid Bruit] : no carotid bruit [Normal S1, S2] : normal S1, S2 [No Murmur] : no murmur [No Rub] : no rub [No Gallop] : no gallop [Clear Lung Fields] : clear lung fields [Good Air Entry] : good air entry [No Respiratory Distress] : no respiratory distress  [Soft] : abdomen soft [Non Tender] : non-tender [No Masses/organomegaly] : no masses/organomegaly [Normal Bowel Sounds] : normal bowel sounds [Normal Gait] : normal gait [No Cyanosis] : no cyanosis [No Clubbing] : no clubbing [No Varicosities] : no varicosities [No Rash] : no rash [No Skin Lesions] : no skin lesions [Moves all extremities] : moves all extremities [No Focal Deficits] : no focal deficits [Normal Speech] : normal speech [Alert and Oriented] : alert and oriented [Normal memory] : normal memory [General Appearance - Well Developed] : well developed [Well Groomed] : well groomed [General Appearance - Well Nourished] : well nourished [General Appearance - In No Acute Distress] : no acute distress [Normal Conjunctiva] : the conjunctiva exhibited no abnormalities [Eyelids - No Xanthelasma] : the eyelids demonstrated no xanthelasmas [Normal Oral Mucosa] : normal oral mucosa [No Oral Pallor] : no oral pallor [No Oral Cyanosis] : no oral cyanosis [Normal Jugular Venous A Waves Present] : normal jugular venous A waves present [Normal Jugular Venous V Waves Present] : normal jugular venous V waves present [No Jugular Venous Perez A Waves] : no jugular venous perez A waves [Respiration, Rhythm And Depth] : normal respiratory rhythm and effort [Exaggerated Use Of Accessory Muscles For Inspiration] : no accessory muscle use [Auscultation Breath Sounds / Voice Sounds] : lungs were clear to auscultation bilaterally [Edema] : no peripheral edema present [Abdomen Soft] : soft [Abdomen Tenderness] : non-tender [Abdomen Mass (___ Cm)] : no abdominal mass palpated [Skin Color & Pigmentation] : normal skin color and pigmentation [] : no rash [No Venous Stasis] : no venous stasis [Skin Lesions] : no skin lesions [No Skin Ulcers] : no skin ulcer [No Xanthoma] : no  xanthoma was observed [Oriented To Time, Place, And Person] : oriented to person, place, and time [Affect] : the affect was normal [Mood] : the mood was normal [No Anxiety] : not feeling anxious [Left Infraclavicular] : left infraclavicular area [de-identified] : bilateral 3 to 4+ [FreeTextEntry1] : clean protruding wire but no erythema or sings of dermatologic erythema or compromise

## 2021-04-28 ENCOUNTER — APPOINTMENT (OUTPATIENT)
Dept: CARDIOLOGY | Facility: CLINIC | Age: 86
End: 2021-04-28
Payer: MEDICARE

## 2021-04-28 PROCEDURE — 93306 TTE W/DOPPLER COMPLETE: CPT

## 2021-05-07 ENCOUNTER — NON-APPOINTMENT (OUTPATIENT)
Age: 86
End: 2021-05-07

## 2021-05-07 ENCOUNTER — APPOINTMENT (OUTPATIENT)
Dept: CARDIOLOGY | Facility: CLINIC | Age: 86
End: 2021-05-07
Payer: MEDICARE

## 2021-05-07 VITALS
WEIGHT: 273 LBS | HEART RATE: 70 BPM | TEMPERATURE: 96.8 F | RESPIRATION RATE: 16 BRPM | SYSTOLIC BLOOD PRESSURE: 134 MMHG | OXYGEN SATURATION: 98 % | DIASTOLIC BLOOD PRESSURE: 77 MMHG | BODY MASS INDEX: 38.22 KG/M2 | HEIGHT: 71 IN

## 2021-05-07 PROCEDURE — 99213 OFFICE O/P EST LOW 20 MIN: CPT

## 2021-05-07 PROCEDURE — 93000 ELECTROCARDIOGRAM COMPLETE: CPT

## 2021-05-07 RX ORDER — LIDOCAINE 5% 700 MG/1
5 PATCH TOPICAL
Qty: 20 | Refills: 3 | Status: DISCONTINUED | COMMUNITY
Start: 2019-11-26 | End: 2021-05-07

## 2021-05-07 RX ORDER — LIDOCAINE 5% 700 MG/1
5 PATCH TOPICAL
Qty: 12 | Refills: 0 | Status: DISCONTINUED | COMMUNITY
Start: 2019-11-25 | End: 2021-05-07

## 2021-05-07 RX ORDER — CYCLOBENZAPRINE HYDROCHLORIDE 10 MG/1
10 TABLET, FILM COATED ORAL
Qty: 20 | Refills: 0 | Status: DISCONTINUED | COMMUNITY
Start: 2019-11-25 | End: 2021-05-07

## 2021-05-07 RX ORDER — METHOCARBAMOL 500 MG/1
500 TABLET, FILM COATED ORAL TWICE DAILY
Qty: 30 | Refills: 0 | Status: DISCONTINUED | COMMUNITY
Start: 2019-08-12 | End: 2021-05-07

## 2021-05-09 NOTE — DISCUSSION/SUMMARY
[Stable] : stable [Responding to Treatment] : responding to treatment [Echocardiogram] : echocardiogram 72.6

## 2021-05-09 NOTE — PHYSICAL EXAM
[Well Developed] : well developed [Well Nourished] : well nourished [No Acute Distress] : no acute distress [Normal Venous Pressure] : normal venous pressure [No Carotid Bruit] : no carotid bruit [Normal S1, S2] : normal S1, S2 [No Murmur] : no murmur [No Rub] : no rub [No Gallop] : no gallop [Clear Lung Fields] : clear lung fields [Good Air Entry] : good air entry [No Respiratory Distress] : no respiratory distress  [Soft] : abdomen soft [Non Tender] : non-tender [No Masses/organomegaly] : no masses/organomegaly [Normal Bowel Sounds] : normal bowel sounds [Normal Gait] : normal gait [No Cyanosis] : no cyanosis [No Clubbing] : no clubbing [No Varicosities] : no varicosities [No Rash] : no rash [No Skin Lesions] : no skin lesions [Moves all extremities] : moves all extremities [No Focal Deficits] : no focal deficits [Normal Speech] : normal speech [Alert and Oriented] : alert and oriented [Normal memory] : normal memory [General Appearance - Well Developed] : well developed [Well Groomed] : well groomed [General Appearance - Well Nourished] : well nourished [General Appearance - In No Acute Distress] : no acute distress [Normal Conjunctiva] : the conjunctiva exhibited no abnormalities [Normal Oral Mucosa] : normal oral mucosa [Eyelids - No Xanthelasma] : the eyelids demonstrated no xanthelasmas [No Oral Pallor] : no oral pallor [No Oral Cyanosis] : no oral cyanosis [Normal Jugular Venous A Waves Present] : normal jugular venous A waves present [Normal Jugular Venous V Waves Present] : normal jugular venous V waves present [No Jugular Venous Perez A Waves] : no jugular venous perez A waves [Respiration, Rhythm And Depth] : normal respiratory rhythm and effort [Exaggerated Use Of Accessory Muscles For Inspiration] : no accessory muscle use [Auscultation Breath Sounds / Voice Sounds] : lungs were clear to auscultation bilaterally [Edema] : no peripheral edema present [Abdomen Soft] : soft [Abdomen Tenderness] : non-tender [Abdomen Mass (___ Cm)] : no abdominal mass palpated [Skin Color & Pigmentation] : normal skin color and pigmentation [] : no rash [No Venous Stasis] : no venous stasis [Skin Lesions] : no skin lesions [No Skin Ulcers] : no skin ulcer [No Xanthoma] : no  xanthoma was observed [Oriented To Time, Place, And Person] : oriented to person, place, and time [Affect] : the affect was normal [Mood] : the mood was normal [No Anxiety] : not feeling anxious [Left Infraclavicular] : left infraclavicular area [de-identified] : bilateral 3 to 4+ [FreeTextEntry1] : clean protruding wire but no erythema or sings of dermatologic erythema or compromise

## 2021-05-09 NOTE — HISTORY OF PRESENT ILLNESS
[FreeTextEntry1] : Jj complains mostly with complaints of edema the legs is seen by Dr. Ornelas August 20 and sonography has failed to reveal evidence for deep vein thrombosis. a recent echo is reviewed and demonstrates probably true regulations at the LV apex with satisfactory left ventricular function overall

## 2021-05-10 ENCOUNTER — NON-APPOINTMENT (OUTPATIENT)
Age: 86
End: 2021-05-10

## 2021-05-10 ENCOUNTER — APPOINTMENT (OUTPATIENT)
Dept: PULMONOLOGY | Facility: CLINIC | Age: 86
End: 2021-05-10
Payer: MEDICARE

## 2021-05-10 ENCOUNTER — LABORATORY RESULT (OUTPATIENT)
Age: 86
End: 2021-05-10

## 2021-05-10 VITALS
OXYGEN SATURATION: 95 % | BODY MASS INDEX: 37.38 KG/M2 | HEART RATE: 62 BPM | SYSTOLIC BLOOD PRESSURE: 120 MMHG | RESPIRATION RATE: 16 BRPM | WEIGHT: 276 LBS | TEMPERATURE: 96.4 F | HEIGHT: 72 IN | DIASTOLIC BLOOD PRESSURE: 65 MMHG

## 2021-05-10 LAB
BASOPHILS # BLD AUTO: 0.02 K/UL
BASOPHILS NFR BLD AUTO: 0.6 %
EOSINOPHIL # BLD AUTO: 0.01 K/UL
EOSINOPHIL NFR BLD AUTO: 0.3 %
FOLATE SERPL-MCNC: >20 NG/ML
HAPTOGLOB SERPL-MCNC: 186 MG/DL
HCT VFR BLD CALC: 35.6 %
HGB BLD-MCNC: 11.4 G/DL
IMM GRANULOCYTES NFR BLD AUTO: 2.2 %
IRON SATN MFR SERPL: 32 %
IRON SERPL-MCNC: 83 UG/DL
LYMPHOCYTES # BLD AUTO: 0.79 K/UL
LYMPHOCYTES NFR BLD AUTO: 25.2 %
MAN DIFF?: NORMAL
MCHC RBC-ENTMCNC: 32 GM/DL
MCHC RBC-ENTMCNC: 33 PG
MCV RBC AUTO: 103.2 FL
MONOCYTES # BLD AUTO: 0.58 K/UL
MONOCYTES NFR BLD AUTO: 18.5 %
NEUTROPHILS # BLD AUTO: 1.67 K/UL
NEUTROPHILS NFR BLD AUTO: 53.2 %
PLATELET # BLD AUTO: 112 K/UL
RBC # BLD: 3.45 M/UL
RBC # BLD: 3.45 M/UL
RBC # FLD: 13.2 %
RETICS # AUTO: 1.2 %
RETICS AGGREG/RBC NFR: 40.7 K/UL
TIBC SERPL-MCNC: 260 UG/DL
UIBC SERPL-MCNC: 177 UG/DL
VIT B12 SERPL-MCNC: 579 PG/ML
WBC # FLD AUTO: 3.14 K/UL

## 2021-05-10 PROCEDURE — 94618 PULMONARY STRESS TESTING: CPT

## 2021-05-10 PROCEDURE — 99204 OFFICE O/P NEW MOD 45 MIN: CPT | Mod: 25

## 2021-05-10 PROCEDURE — 94010 BREATHING CAPACITY TEST: CPT

## 2021-05-10 PROCEDURE — 71046 X-RAY EXAM CHEST 2 VIEWS: CPT

## 2021-05-10 PROCEDURE — 95012 NITRIC OXIDE EXP GAS DETER: CPT

## 2021-05-10 NOTE — PROCEDURE
[FreeTextEntry1] : ECHO (4/28/2021) revealed: 1. normal mitral valve. 2. calcified trileaflet aortic valve with normal opening. Mild aortic regurgitation. 3. Aortic root: 3.7 cm. 4. Moderately dilated left atrum. LA volume inex = 43 cc/m2. 5. Eccentric left ventricular hypertrophy (dilated left ventricle with normal relative wall thickness). 6. Mild global left ventricular systolic dysfunction. Trabeculations at LV apex. 7. Indeterminate diastolic function. 8. A device wire is notes in the right heart. 9. Normal right ventricular size and function. 10. Estimated right ventricular systolic pressure equals 59 mm Hg, assuming right atrial pressure equals 10 mm Hg, consistent with moderate pulmonary hypertension. 11. Normal tricuspid valve. Moderate tricuspid regurgitation. 12. Normal pulmonic valve. Estimated pulmonary artery systolic pressure equals 59 mm Hg, assuming right atrial pressure equals 10 mm Hg, consistent with moderate pulmonary pressures. 13. Normal pericardium with no pericardial effusion.\par \par XR Chest PA LAT 2V (4/21/2021) revealed no acute radiographic cardiopulmonary pathology.\par \par CXR revealed moderate cardiomegaly and left AICD; there was no evidence of infiltrate or effusion\par \par Full PFT (4/16/2021) revealed mild-moderate obstructive dysfunction, with a FEV1 of 1.19L, which is 67% of predicted, normal lung volumes, and a low-normal diffusion of 6.9, which is 73% of predicted, with a normal flow volume loop \par \par PFT revealed mild restrictive and mild obstructive dysfunction, with a FEV1 of 2.22L, which is 77% of predicted, with a normal flow volume loop\par \par 6 minute walk test reveals a low saturation of 93% with slight dyspnea or fatigue; walked 130.4 meters. Patient did not complete walk due to leg weakness\par \par Feno was 21; a normal value being less than 25. Fractional exhaled nitric oxide (FENO) is regarded as a simple, noninvasive method for assessing eosinophilic airway inflammation. Produced by a variety of cells within the lung, nitric oxide (NO) concentrations are generally low in healthy individuals. However, high concentrations of NO appear to be involved in nonspecific host defense mechanisms and chronic inflammatory  diseases such as asthma. The American Thoracic Society (ATS) therefore recommended using FENO to aid in the diagnosis and monitoring of eosinophilic airway inflammation and asthma, and for identifying steroid responsive individuals whose chronic respiratory symptoms may be caused by airway inflammation \par  \par  \par -Images and procedures reviewed in detail and discussed with patient.

## 2021-05-10 NOTE — ASSESSMENT
[FreeTextEntry1] : Mr. BROWER is a 86 year male with a history of CAD, iliac artery aneurysm, AICD, CHF, dilated cardiomyopathy, GERD, AFib, HTN, hypothyroid, HLD, obesity, CJ, PVD, venous stasis changes, who now comes in for an initial pulmonary evaluation SOB\par \par \par The patient's SOB is felt to be multifactorial:\par -poor mechanics of breathing\par -out of shape/overweight\par -Anemia\par -Pulmonary\par  -pulmonary arterial hypertension (PAH) ?WHO Class 1, 2, 3\par  -mild asthma\par  -restrictive dysfunction\par \par -Cardiac\par  -AFib\par  -cardiomyopathy\par \par Problem 1: ?PAH\par -Complete a right heart catheterization (Dr. Riggs or one of the other interventional cardiologists in the group)\par Pulmonary hypertension is a disorder of the pulmonary arteries. It is important to distinguish the difference between pulmonary arterial hypertension which is idiopathic versus secondary pulmonary hypertension which is related to heart disease being diastolic dysfunction or congestive heart failure or other forms of pulmonary hypertension. Diagnostics include an initial echocardiogram evaluating the pulmonary artery pressures, if this is abnormal, to proceed with a VQ scan as well as a CTPA and an eventual right heart catheterization to absolutely confirm the echocardiogram findings. (No medication can be prescribed until the right heart catheterization). If present, the evaluation will include rheumatological blood testing, HIV testing, and potential evaluation for cirrhosis. Drug classes include PED5 (Revatio, Adcirca) ETRA (Tracleer, Macitentan, Letairis), Soluble guanylate cyclase (Adempas) Prostacyclins (Uptravi, Tyavso, Ventavis, Remodulin, or Orenitram derivatives). \par \par Problem 2: Asthma based on PFT (4/16/2021)\par -Complete full PFTs to follow\par -add Trelegy 200 1 inhalation qD \par - Inhaler technique reviewed as well as oral hygiene technique reviewed with patient. Avoidance of cold air, extremes of temperature, rescue inhaler should be used before exercise. Order of medication reviewed with patient. Recommended use of a cool mist humidifier in the bedroom. \par - Asthma is believed to be caused by inherited (genetic) and environmental factor, but its exact cause is unknown. asthma may be triggered by allergens, lung infections, or irritants in the air. Asthma triggers are different for each person \par \par Problem 3: GERD\par -continue Protonix 40 mg QAM, pre-breakfast\par -Rule of 2s: avoid eating too much, eating too late, eating too spicy, eating two hours before bed.\par - Things to avoid including overeating, spicy foods, tight clothing, eating within two hours of bed, this list is not all inclusive.\par - For treatments of reflux, possible options discussed including diet control, H2 blockers, PPIs, as well as coating motility agents discussed as treatment options. Timing of meals and proximity of last meal to sleep were discussed. If symptoms persist, a formal gastrointestinal evaluation is needed. \par \par Problem 5: CJ\par -Complete a split night sleep study\par -Sleep apnea is associated with adverse clinical consequences which can affect most organ systems. Cardiovascular disease risk includes arrhythmias, atrial fibrillation, hypertension, coronary artery disease, and stroke. Metabolic disorders include diabetes type 2, non-alcoholic fatty liver disease. Mood disorder especially depression; and cognitive decline especially in the elderly. Associations with chronic reflux/Mata’s esophagus some but not all inclusive. \par -Reasons include arousal consistent with hypopnea; respiratory events most prominent in REM sleep or supine position; therefore sleep staging and body position are important for accurate diagnosis and estimation of AHI. \par  \par Problem 6: Poor Balance\par -Script for balance therapy given\par \par Problem :Cardiac\par -Recommend cardiac follow up evaluation with cardiologist Dr. Razo if needed \par \par Problem :overweight/out of shape\par - Weight loss, exercise and diet control were discussed and are highly encouraged. Treatment options were given such as aqua therapy, and contacting a nutritionist. Recommended to use the elliptical, stationary bike, less use of treadmill. Mindful eating was explained to the patient. Obesity is associated with worsening asthma, SOB, and potential for cardiac disease, diabetes, and other underlying medical conditions.\par \par Problem : Poor mechanics of breathing\par -Recommend Wimhof and Buteyko breathing techniques\par - Proper breathing techniques were reviewed with an emphasis on exhalation. Patient instructed to breath in for 1 second and out for four seconds. Patient was encouraged not to talk while walking.\par \par Problem : Health Maintenance\par -s/p COVID-19 vaccine x2\par -s/p flu shot\par -recommended strep pneumonia vaccines: Prevnar-13 vaccine, follow by Pneumo vaccine 23 one year following\par -recommended early intervention for URIs\par -recommended regular osteoporosis evaluations\par -recommended early dermatological evaluations\par -recommended after the age of 50 to the age of 70, colonoscopy every 5 years\par \par f/u in 6-8 weeks\par pt is encouraged to call or fax the office with any questions or concerns.\par

## 2021-05-10 NOTE — PHYSICAL EXAM
[No Acute Distress] : no acute distress [Normal Oropharynx] : normal oropharynx [Normal Appearance] : normal appearance [No Neck Mass] : no neck mass [Normal Rate/Rhythm] : normal rate/rhythm [Normal S1, S2] : normal s1, s2 [No Murmurs] : no murmurs [No Resp Distress] : no resp distress [Clear to Auscultation Bilaterally] : clear to auscultation bilaterally [No Abnormalities] : no abnormalities [Benign] : benign [Normal Gait] : normal gait [No Clubbing] : no clubbing [No Cyanosis] : no cyanosis [No Edema] : no edema [FROM] : FROM [Normal Color/ Pigmentation] : normal color/ pigmentation [No Focal Deficits] : no focal deficits [Oriented x3] : oriented x3 [Normal Affect] : normal affect [III] : Mallampati Class: III [Kyphosis] : kyphosis [TextBox_2] : OW [TextBox_68] : I:E 1:3; mild crackles at the base [TextBox_80] : mild kyphosis, AICD left chest [TextBox_105] : 3+ LE edema to the mid calf

## 2021-05-10 NOTE — HISTORY OF PRESENT ILLNESS
[TextBox_4] : Mr. BROWER is a 86 year male with a history of CAD, iliac artery aneurysm, AICD, CHF, dilated cardiomyopathy, GERD, AFib, HTN, hypothyroid, HLD, obesity, CJ, PVD, venous stasis changes, who now comes in for an initial pulmonary evaluation. His chief complaint is\par -he notes being SOB onset 5-10 years\par -he notes the SOB has been getting worse\par -he notes SOB when he bends over and walking\par -he notes his balance is slightly off\par -he notes being afraid to go upstairs because of his balance\par -he notes some wheezing in the evening sometimes\par -he notes having PNA once or twice\par -he notes when he gets a cold it drops into his chest\par -he notes reflux\par -he notes dry mouth in the morning\par -he notes trying the CPAP but it didn't fit\par -he notes his weight is high but stable\par -he notes his energy level is 4/10\par -he notes he is starting to forget words in other languages but his long term memory is normal\par -he notes dryness in his left ankle\par -he notes constant ankle swelling - not new\par -he notes he used to be active in sports and twisted his ankle frequently\par -s/p COVID-19 vaccine x2\par \par - patient denies any headaches, nausea, vomiting, fever, chills, sweats, chest pain, chest pressure, palpitations, coughing, wheezing, fatigue, diarrhea, constipation, dysphagia, myalgias, dizziness, leg pain, itchy eyes, itchy ears, or sour taste in the mouth

## 2021-05-10 NOTE — ADDENDUM
[FreeTextEntry1] : Documented by Edin Helm acting as a scribe for Dr. George Dunlap on (05/10/2021).\par \par All medical record entries made by the Scribe were at my, Dr. George Dunlap's, direction and personally dictated by me on (05/10/2021). I have reviewed the chart and agree that the record accurately reflects my personal performance of the history, physical exam, assessment and plan. I have also personally directed, reviewed, and agree with the discharge instructions.\par

## 2021-05-10 NOTE — REASON FOR VISIT
[Initial] : an initial visit [Spouse] : spouse [Family Member] : family member [TextBox_44] : SOB; Likely asthma, PAH, OSAS

## 2021-05-11 ENCOUNTER — APPOINTMENT (OUTPATIENT)
Dept: CARDIOLOGY | Facility: CLINIC | Age: 86
End: 2021-05-11
Payer: MEDICARE

## 2021-05-11 LAB
INR PPP: 2 RATIO
POCT-PROTHROMBIN TIME: 23.5 SECS
QUALITY CONTROL: YES

## 2021-05-11 PROCEDURE — 99211 OFF/OP EST MAY X REQ PHY/QHP: CPT | Mod: 25

## 2021-05-11 PROCEDURE — 85610 PROTHROMBIN TIME: CPT | Mod: QW

## 2021-05-12 ENCOUNTER — OUTPATIENT (OUTPATIENT)
Dept: OUTPATIENT SERVICES | Facility: HOSPITAL | Age: 86
LOS: 1 days | Discharge: HOME | End: 2021-05-12

## 2021-05-12 DIAGNOSIS — D64.9 ANEMIA, UNSPECIFIED: ICD-10-CM

## 2021-05-12 DIAGNOSIS — Z98.89 OTHER SPECIFIED POSTPROCEDURAL STATES: Chronic | ICD-10-CM

## 2021-05-13 ENCOUNTER — APPOINTMENT (OUTPATIENT)
Dept: HEMATOLOGY ONCOLOGY | Facility: CLINIC | Age: 86
End: 2021-05-13
Payer: MEDICARE

## 2021-05-13 VITALS
DIASTOLIC BLOOD PRESSURE: 87 MMHG | WEIGHT: 278.44 LBS | BODY MASS INDEX: 37.76 KG/M2 | TEMPERATURE: 96.6 F | SYSTOLIC BLOOD PRESSURE: 150 MMHG | OXYGEN SATURATION: 96 % | HEART RATE: 59 BPM | RESPIRATION RATE: 16 BRPM

## 2021-05-13 PROCEDURE — 99214 OFFICE O/P EST MOD 30 MIN: CPT

## 2021-05-13 NOTE — ASSESSMENT
[FreeTextEntry1] : Lab work reviewed.\par Patient with history of pancytopenia with elevated MCV and increased monocyte percentage–recent lab work reviewed with him. ANC, hemoglobin, platelet count acceptable currently.\par It remains possible the patient has an underlying myelodysplastic process/CMML.  At this time, no plans for treatment for his cytopenias.  Patient is agreeable to interval follow-up.  Should hematologic scenario worsen/change, then further evaluation with follow-up BMB would be warranted should he wish to pursue this.\par Patient to continue p.o. vitamin B12 supplement for h/o vitamin B-12 level less than 400, and p.o. folic acid (h/o slightly elevated homocysteine level).\par \par Patient and his wife were given the opportunity to ask questions.  Their questions have been answered to their apparent satisfaction at this time.

## 2021-05-13 NOTE — HISTORY OF PRESENT ILLNESS
[de-identified] : 2012–Pancytopenia with macrocytosis.\par 10/2016–Bone marrow biopsy and bone marrow aspirate showed a hypercellular bone marrow with trilineage hematopoiesis and maturation, increased iron stores.  Flow cytometry myeloid immunophenotypic findings showed no increase in myeloid immaturity.  The lymphocyte immunophenotypic findings showed T-cell predominance and rare B cells.  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 Alcazar who monitored his CBC with differential.\par 7/2020–Peripheral blood flow cytometry lymphocyte immunophenotypic findings showed no diagnostic abnormalities with lymphopenia.  Myeloid immunophenotypic findings showed normal myeloid granularity with no increase in myeloid immaturity.  No significant absolute lymphocytosis, monocytosis or neutropenia.  MDS FISH panel normal. [de-identified] : Got COVID vaccines (Moderna)-no adverse reaction.\par +KIM. Referred to Dr. Dunlap (pulmonary) by cardiologist. Cardiopulmonary evaluation in progress. Recommended cardiac cath. which patient is deferring. Considering f/u sleep apnea test, CPAP.\par No bleeding.\par Has chronic peripheral edema-no acute change.\par No fevers, cough. No abdominal pain. No current c/o CP.

## 2021-05-13 NOTE — REVIEW OF SYSTEMS
[Negative] : Allergic/Immunologic [Lower Ext Edema] : lower extremity edema [SOB on Exertion] : shortness of breath during exertion

## 2021-05-17 ENCOUNTER — NON-APPOINTMENT (OUTPATIENT)
Age: 86
End: 2021-05-17

## 2021-06-08 ENCOUNTER — APPOINTMENT (OUTPATIENT)
Dept: CARDIOLOGY | Facility: CLINIC | Age: 86
End: 2021-06-08
Payer: MEDICARE

## 2021-06-08 LAB
INR PPP: 2.4 RATIO
POCT-PROTHROMBIN TIME: 29.4 SECS
QUALITY CONTROL: YES

## 2021-06-08 PROCEDURE — 85610 PROTHROMBIN TIME: CPT | Mod: QW

## 2021-06-08 PROCEDURE — 99211 OFF/OP EST MAY X REQ PHY/QHP: CPT | Mod: 25

## 2021-06-29 ENCOUNTER — NON-APPOINTMENT (OUTPATIENT)
Age: 86
End: 2021-06-29

## 2021-06-29 ENCOUNTER — APPOINTMENT (OUTPATIENT)
Dept: ELECTROPHYSIOLOGY | Facility: CLINIC | Age: 86
End: 2021-06-29
Payer: MEDICARE

## 2021-06-29 PROCEDURE — 93296 REM INTERROG EVL PM/IDS: CPT

## 2021-06-29 PROCEDURE — 93295 DEV INTERROG REMOTE 1/2/MLT: CPT

## 2021-07-06 ENCOUNTER — APPOINTMENT (OUTPATIENT)
Dept: CARDIOLOGY | Facility: CLINIC | Age: 86
End: 2021-07-06
Payer: MEDICARE

## 2021-07-06 LAB
INR PPP: 2.4 RATIO
POCT-PROTHROMBIN TIME: 29.3 SECS
QUALITY CONTROL: YES

## 2021-07-06 PROCEDURE — 99211 OFF/OP EST MAY X REQ PHY/QHP: CPT | Mod: 25

## 2021-07-06 PROCEDURE — 85610 PROTHROMBIN TIME: CPT | Mod: QW

## 2021-07-13 ENCOUNTER — RX RENEWAL (OUTPATIENT)
Age: 86
End: 2021-07-13

## 2021-07-14 ENCOUNTER — APPOINTMENT (OUTPATIENT)
Dept: CARDIOLOGY | Facility: CLINIC | Age: 86
End: 2021-07-14

## 2021-07-22 ENCOUNTER — APPOINTMENT (OUTPATIENT)
Dept: FAMILY MEDICINE | Facility: CLINIC | Age: 86
End: 2021-07-22
Payer: MEDICARE

## 2021-07-22 VITALS
BODY MASS INDEX: 37.65 KG/M2 | SYSTOLIC BLOOD PRESSURE: 110 MMHG | RESPIRATION RATE: 16 BRPM | HEART RATE: 76 BPM | WEIGHT: 278 LBS | DIASTOLIC BLOOD PRESSURE: 80 MMHG | HEIGHT: 72 IN | TEMPERATURE: 98.4 F | OXYGEN SATURATION: 98 %

## 2021-07-22 DIAGNOSIS — M25.569 PAIN IN UNSPECIFIED KNEE: ICD-10-CM

## 2021-07-22 DIAGNOSIS — Z87.19 PERSONAL HISTORY OF OTHER DISEASES OF THE DIGESTIVE SYSTEM: ICD-10-CM

## 2021-07-22 DIAGNOSIS — M25.422 EFFUSION, LEFT ELBOW: ICD-10-CM

## 2021-07-22 PROCEDURE — 99213 OFFICE O/P EST LOW 20 MIN: CPT

## 2021-07-23 PROBLEM — M25.422 EFFUSION OF LEFT ELBOW: Status: RESOLVED | Noted: 2021-03-23 | Resolved: 2021-07-23

## 2021-07-23 NOTE — REVIEW OF SYSTEMS
[Lower Ext Edema] : lower extremity edema [Cough] : cough [Dyspnea on Exertion] : dyspnea on exertion [Joint Pain] : joint pain [Joint Stiffness] : joint stiffness [Back Pain] : back pain [Unsteady Walk] : ataxia [Negative] : Psychiatric [Chest Pain] : no chest pain [Palpitations] : no palpitations [Claudication] : no  leg claudication [Orthopena] : no orthopnea [Paroxysmal Nocturnal Dyspnea] : no paroxysmal nocturnal dyspnea [Shortness Of Breath] : no shortness of breath [Wheezing] : no wheezing [FreeTextEntry9] : knee pain left >R LS Spine pain

## 2021-07-23 NOTE — HISTORY OF PRESENT ILLNESS
[Spouse] : spouse [FreeTextEntry1] : f/u [de-identified] : 85 y/o here with wife f/u medical conditions  went to Dr thakkar sent to get sleep study done and advised right heart cath but pt reluctant to do it. Afraid of invasive procedure to get measure of pulm pressures./ pt now here c/o LS spine pain comes and goes gets better with activities and worse when sitting  Saw Dr Castellon and had injections knee that helped

## 2021-07-23 NOTE — PHYSICAL EXAM
[No Acute Distress] : no acute distress [Well-Appearing] : well-appearing [No JVD] : no jugular venous distention [No Lymphadenopathy] : no lymphadenopathy [Supple] : supple [Clear to Auscultation] : lungs were clear to auscultation bilaterally [Normal S1, S2] : normal S1 and S2 [Soft] : abdomen soft [Non Tender] : non-tender [Non-distended] : non-distended [No CVA Tenderness] : no CVA  tenderness [No Spinal Tenderness] : no spinal tenderness [de-identified] : 2+ swel;ling bilateral legs wearing support stockings

## 2021-08-03 ENCOUNTER — APPOINTMENT (OUTPATIENT)
Dept: CARDIOLOGY | Facility: CLINIC | Age: 86
End: 2021-08-03
Payer: MEDICARE

## 2021-08-03 ENCOUNTER — LABORATORY RESULT (OUTPATIENT)
Age: 86
End: 2021-08-03

## 2021-08-03 LAB
INR PPP: 3.1 RATIO
POCT-PROTHROMBIN TIME: 37.2 SECS
QUALITY CONTROL: YES
RBC # BLD: 3.53 M/UL
RETICS # AUTO: 1.4 %
RETICS AGGREG/RBC NFR: 50.8 K/UL

## 2021-08-03 PROCEDURE — 99211 OFF/OP EST MAY X REQ PHY/QHP: CPT | Mod: 25

## 2021-08-03 PROCEDURE — 85610 PROTHROMBIN TIME: CPT | Mod: QW

## 2021-08-04 LAB
BASOPHILS # BLD AUTO: 0.02 K/UL
BASOPHILS NFR BLD AUTO: 0.5 %
EOSINOPHIL # BLD AUTO: 0.03 K/UL
EOSINOPHIL NFR BLD AUTO: 0.7 %
FERRITIN SERPL-MCNC: 109 NG/ML
FOLATE SERPL-MCNC: >20 NG/ML
HCT VFR BLD CALC: 36.5 %
HGB BLD-MCNC: 11.5 G/DL
IMM GRANULOCYTES NFR BLD AUTO: 1.9 %
IRON SATN MFR SERPL: 23 %
IRON SERPL-MCNC: 67 UG/DL
LYMPHOCYTES # BLD AUTO: 0.73 K/UL
LYMPHOCYTES NFR BLD AUTO: 16.9 %
MAN DIFF?: NORMAL
MCHC RBC-ENTMCNC: 31.5 GM/DL
MCHC RBC-ENTMCNC: 32.6 PG
MCV RBC AUTO: 103.4 FL
MONOCYTES # BLD AUTO: 0.87 K/UL
MONOCYTES NFR BLD AUTO: 20.1 %
NEUTROPHILS # BLD AUTO: 2.59 K/UL
NEUTROPHILS NFR BLD AUTO: 59.9 %
PLATELET # BLD AUTO: 92 K/UL
RBC # BLD: 3.53 M/UL
RBC # FLD: 14.4 %
TIBC SERPL-MCNC: 291 UG/DL
UIBC SERPL-MCNC: 224 UG/DL
VIT B12 SERPL-MCNC: 646 PG/ML
WBC # FLD AUTO: 4.32 K/UL

## 2021-08-11 ENCOUNTER — APPOINTMENT (OUTPATIENT)
Dept: CARDIOLOGY | Facility: CLINIC | Age: 86
End: 2021-08-11
Payer: MEDICARE

## 2021-08-11 PROCEDURE — 93289 INTERROG DEVICE EVAL HEART: CPT

## 2021-08-11 NOTE — PROCEDURE
[CRT-D] : Cardiac resynchronization therapy defibrillator [VVIR] : VVIR [Longevity: ___ months] : The estimated remaining battery life is [unfilled] months [Threshold Testing Performed] : Threshold testing was performed [Lead Imp:  ___ohms] : lead impedance was [unfilled] ohms [___V @] : [unfilled] V [___ ms] : [unfilled] ms [None] : none [Pace ___ %] : Pace [unfilled]% [de-identified] : bi v paced [de-identified] : st judes [de-identified] : [id number] [de-identified] : 12/11/2018 [de-identified] : [address] [de-identified] : gretchen lead present working well no evidence of insulation problem\par \par no therapies\par normal function\par \par

## 2021-08-31 ENCOUNTER — APPOINTMENT (OUTPATIENT)
Dept: CARDIOLOGY | Facility: CLINIC | Age: 86
End: 2021-08-31
Payer: MEDICARE

## 2021-08-31 LAB
INR PPP: 2.3 RATIO
POCT-PROTHROMBIN TIME: 27.9 SECS
QUALITY CONTROL: YES

## 2021-08-31 PROCEDURE — 99211 OFF/OP EST MAY X REQ PHY/QHP: CPT | Mod: 25

## 2021-08-31 PROCEDURE — 85610 PROTHROMBIN TIME: CPT | Mod: QW

## 2021-09-09 ENCOUNTER — RX RENEWAL (OUTPATIENT)
Age: 86
End: 2021-09-09

## 2021-09-14 ENCOUNTER — MED ADMIN CHARGE (OUTPATIENT)
Age: 86
End: 2021-09-14

## 2021-09-14 ENCOUNTER — APPOINTMENT (OUTPATIENT)
Dept: FAMILY MEDICINE | Facility: CLINIC | Age: 86
End: 2021-09-14

## 2021-09-14 VITALS — TEMPERATURE: 97.5 F

## 2021-09-15 ENCOUNTER — NON-APPOINTMENT (OUTPATIENT)
Age: 86
End: 2021-09-15

## 2021-09-15 ENCOUNTER — OUTPATIENT (OUTPATIENT)
Dept: OUTPATIENT SERVICES | Facility: HOSPITAL | Age: 86
LOS: 1 days | Discharge: HOME | End: 2021-09-15

## 2021-09-15 ENCOUNTER — APPOINTMENT (OUTPATIENT)
Dept: CARDIOLOGY | Facility: CLINIC | Age: 86
End: 2021-09-15
Payer: MEDICARE

## 2021-09-15 VITALS
OXYGEN SATURATION: 98 % | HEIGHT: 72 IN | TEMPERATURE: 97.5 F | RESPIRATION RATE: 20 BRPM | SYSTOLIC BLOOD PRESSURE: 126 MMHG | HEART RATE: 60 BPM | DIASTOLIC BLOOD PRESSURE: 77 MMHG | BODY MASS INDEX: 37.38 KG/M2 | WEIGHT: 276 LBS

## 2021-09-15 DIAGNOSIS — Z98.89 OTHER SPECIFIED POSTPROCEDURAL STATES: Chronic | ICD-10-CM

## 2021-09-15 DIAGNOSIS — D64.9 ANEMIA, UNSPECIFIED: ICD-10-CM

## 2021-09-15 PROCEDURE — 93000 ELECTROCARDIOGRAM COMPLETE: CPT

## 2021-09-15 PROCEDURE — 99213 OFFICE O/P EST LOW 20 MIN: CPT

## 2021-09-16 ENCOUNTER — APPOINTMENT (OUTPATIENT)
Dept: HEMATOLOGY ONCOLOGY | Facility: CLINIC | Age: 86
End: 2021-09-16
Payer: MEDICARE

## 2021-09-16 PROCEDURE — 99213 OFFICE O/P EST LOW 20 MIN: CPT | Mod: 95

## 2021-09-16 NOTE — HISTORY OF PRESENT ILLNESS
[de-identified] : 2012–Pancytopenia with macrocytosis.\par 10/2016–Bone marrow biopsy and bone marrow aspirate showed a hypercellular bone marrow with trilineage hematopoiesis and maturation, increased iron stores.  Flow cytometry myeloid immunophenotypic findings showed no increase in myeloid immaturity.  The lymphocyte immunophenotypic findings showed T-cell predominance and rare B cells.  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 Alcazar who monitored his CBC with differential.\par 7/2020–Peripheral blood flow cytometry lymphocyte immunophenotypic findings showed no diagnostic abnormalities with lymphopenia.  Myeloid immunophenotypic findings showed normal myeloid granularity with no increase in myeloid immaturity.  No significant absolute lymphocytosis, monocytosis or neutropenia.  MDS FISH panel normal. [de-identified] : S/P left cheek BCC Moh's surgery, with right side scheduled as well.\par Saw pulmonologist with sleep study scheduled. +KIM. Cardiologist recommended cardiac cath. which patient is deferring. \par No bleeding. No fevers, cough. No abdominal pain. No current c/o CP.\par \par Got COVID vaccines (Moderna).

## 2021-09-16 NOTE — REVIEW OF SYSTEMS
[Negative] : Allergic/Immunologic [SOB on Exertion] : shortness of breath during exertion [Chest Pain] : no chest pain

## 2021-09-16 NOTE — PHYSICAL EXAM
[Normal] : affect appropriate [de-identified] : breathing appeared unlabored [de-identified] : coloration appeared normal

## 2021-09-16 NOTE — ASSESSMENT
Jeremiah ALLEN8/3/1959 attended Step 4 Class: Complications, Special Occasion Eating  Date: 12/1/2020  Referring Provider: Dr. Nikhil Gray  Start time: 5:30 End time: 7:00    Due to [address] [place] [address], the patient's office visit was conducted via [FreeTextEntry1] : Lab work reviewed.\par Patient with history of pancytopenia with elevated MCV and increased monocyte percentage–recent lab work reviewed with him. ANC WNL, hemoglobin stable.  Platelet count appears to be fluctuating.\par It remains possible the patient has an underlying myelodysplastic process/CMML.  No overt bleeding noted clinically at present. Patient is agreeable to interval follow-up.  \par Should hematologic scenario worsen/change, then further evaluation with follow-up BMB would be warranted should he wish to pursue this.\par Patient to continue p.o. vitamin B12 supplement for h/o vitamin B-12 level less than 400, and p.o. folic acid (h/o slightly elevated homocysteine level).\par \par Patient and his wife were given the opportunity to ask questions.  Their questions have been answered to their apparent satisfaction at this time. covered.   Recommendation: attend Step 5 Class  Tamera Arnold MS RD LDN CDE

## 2021-09-16 NOTE — REASON FOR VISIT
[Home] : at home, [unfilled] , at the time of the visit. [Medical Office: ([facility])___] : at the medical office located in  [Verbal consent obtained from patient] : the patient, [unfilled] [Follow-Up Visit] : a follow-up visit for [Spouse] : spouse [FreeTextEntry2] : pancytopenia

## 2021-09-18 ENCOUNTER — TRANSCRIPTION ENCOUNTER (OUTPATIENT)
Age: 86
End: 2021-09-18

## 2021-09-18 ENCOUNTER — FORM ENCOUNTER (OUTPATIENT)
Age: 86
End: 2021-09-18

## 2021-09-19 ENCOUNTER — OUTPATIENT (OUTPATIENT)
Dept: OUTPATIENT SERVICES | Facility: HOSPITAL | Age: 86
LOS: 1 days | End: 2021-09-19
Payer: MEDICARE

## 2021-09-19 ENCOUNTER — APPOINTMENT (OUTPATIENT)
Dept: SLEEP CENTER | Facility: CLINIC | Age: 86
End: 2021-09-19
Payer: MEDICARE

## 2021-09-19 DIAGNOSIS — Z98.89 OTHER SPECIFIED POSTPROCEDURAL STATES: Chronic | ICD-10-CM

## 2021-09-19 PROCEDURE — 95811 POLYSOM 6/>YRS CPAP 4/> PARM: CPT | Mod: 26

## 2021-09-19 PROCEDURE — 95811 POLYSOM 6/>YRS CPAP 4/> PARM: CPT

## 2021-09-19 NOTE — PHYSICAL EXAM
[Well Developed] : well developed [Well Nourished] : well nourished [No Acute Distress] : no acute distress [Normal Venous Pressure] : normal venous pressure [No Carotid Bruit] : no carotid bruit [Normal S1, S2] : normal S1, S2 [No Murmur] : no murmur [No Rub] : no rub [No Gallop] : no gallop [Good Air Entry] : good air entry [Clear Lung Fields] : clear lung fields [No Respiratory Distress] : no respiratory distress  [Soft] : abdomen soft [Non Tender] : non-tender [No Masses/organomegaly] : no masses/organomegaly [Normal Bowel Sounds] : normal bowel sounds [Normal Gait] : normal gait [No Clubbing] : no clubbing [No Cyanosis] : no cyanosis [No Varicosities] : no varicosities [No Rash] : no rash [No Skin Lesions] : no skin lesions [Moves all extremities] : moves all extremities [No Focal Deficits] : no focal deficits [Normal Speech] : normal speech [Alert and Oriented] : alert and oriented [Normal memory] : normal memory [General Appearance - Well Developed] : well developed [Well Groomed] : well groomed [General Appearance - Well Nourished] : well nourished [General Appearance - In No Acute Distress] : no acute distress [Normal Conjunctiva] : the conjunctiva exhibited no abnormalities [Eyelids - No Xanthelasma] : the eyelids demonstrated no xanthelasmas [Normal Oral Mucosa] : normal oral mucosa [No Oral Pallor] : no oral pallor [No Oral Cyanosis] : no oral cyanosis [Normal Jugular Venous A Waves Present] : normal jugular venous A waves present [Normal Jugular Venous V Waves Present] : normal jugular venous V waves present [No Jugular Venous Perez A Waves] : no jugular venous perez A waves [Respiration, Rhythm And Depth] : normal respiratory rhythm and effort [Exaggerated Use Of Accessory Muscles For Inspiration] : no accessory muscle use [Auscultation Breath Sounds / Voice Sounds] : lungs were clear to auscultation bilaterally [Edema] : no peripheral edema present [Abdomen Soft] : soft [Abdomen Tenderness] : non-tender [Abdomen Mass (___ Cm)] : no abdominal mass palpated [Skin Color & Pigmentation] : normal skin color and pigmentation [] : no rash [No Venous Stasis] : no venous stasis [Skin Lesions] : no skin lesions [No Skin Ulcers] : no skin ulcer [No Xanthoma] : no  xanthoma was observed [Oriented To Time, Place, And Person] : oriented to person, place, and time [Mood] : the mood was normal [Affect] : the affect was normal [No Anxiety] : not feeling anxious [Left Infraclavicular] : left infraclavicular area [de-identified] : bilateral 3 to 4+ [FreeTextEntry1] : clean protruding wire but no erythema or sings of dermatologic erythema or compromise

## 2021-09-19 NOTE — ASSESSMENT
[FreeTextEntry1] :  x-ray and echo are considered in order to assess for heart failure and all the dysfunction continual leg elevation as indicated. a right heart cath is planned for 9/28 although he is reluctant to undergo catheterization given intrinsic cardiac risk

## 2021-09-19 NOTE — HISTORY OF PRESENT ILLNESS
[FreeTextEntry1] : Jj complains mostly with complaints of edema the legs is seen by Dr. Ornelas August 20 and sonography has failed to reveal evidence for deep vein thrombosis. a recent echo is reviewed and demonstrates probably trabeculations at the LV apex with satisfactory left ventricular function overall. 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 has apnea at night on a CPAP

## 2021-09-19 NOTE — DISCUSSION/SUMMARY
[Stable] : stable [Responding to Treatment] : responding to treatment [Echocardiogram] : echocardiogram

## 2021-09-20 DIAGNOSIS — G47.33 OBSTRUCTIVE SLEEP APNEA (ADULT) (PEDIATRIC): ICD-10-CM

## 2021-09-27 ENCOUNTER — APPOINTMENT (OUTPATIENT)
Dept: CARDIOLOGY | Facility: CLINIC | Age: 86
End: 2021-09-27
Payer: MEDICARE

## 2021-09-27 LAB
INR PPP: 2.7 RATIO
POCT-PROTHROMBIN TIME: 32.8 SECS
QUALITY CONTROL: YES

## 2021-09-27 PROCEDURE — 85610 PROTHROMBIN TIME: CPT | Mod: QW

## 2021-09-27 PROCEDURE — 99211 OFF/OP EST MAY X REQ PHY/QHP: CPT | Mod: 25

## 2021-09-28 ENCOUNTER — NON-APPOINTMENT (OUTPATIENT)
Age: 86
End: 2021-09-28

## 2021-09-28 ENCOUNTER — APPOINTMENT (OUTPATIENT)
Dept: ELECTROPHYSIOLOGY | Facility: CLINIC | Age: 86
End: 2021-09-28
Payer: MEDICARE

## 2021-09-28 PROCEDURE — 93295 DEV INTERROG REMOTE 1/2/MLT: CPT

## 2021-09-28 PROCEDURE — 93296 REM INTERROG EVL PM/IDS: CPT

## 2021-10-15 ENCOUNTER — RX RENEWAL (OUTPATIENT)
Age: 86
End: 2021-10-15

## 2021-10-18 ENCOUNTER — RX RENEWAL (OUTPATIENT)
Age: 86
End: 2021-10-18

## 2021-10-19 ENCOUNTER — RX RENEWAL (OUTPATIENT)
Age: 86
End: 2021-10-19

## 2021-10-22 ENCOUNTER — APPOINTMENT (OUTPATIENT)
Dept: PULMONOLOGY | Facility: CLINIC | Age: 86
End: 2021-10-22
Payer: MEDICARE

## 2021-10-22 VITALS
OXYGEN SATURATION: 94 % | WEIGHT: 277 LBS | HEART RATE: 60 BPM | RESPIRATION RATE: 16 BRPM | SYSTOLIC BLOOD PRESSURE: 112 MMHG | TEMPERATURE: 96.1 F | DIASTOLIC BLOOD PRESSURE: 68 MMHG | BODY MASS INDEX: 38.78 KG/M2 | HEIGHT: 71 IN

## 2021-10-22 DIAGNOSIS — R06.00 DYSPNEA, UNSPECIFIED: ICD-10-CM

## 2021-10-22 DIAGNOSIS — R26.89 OTHER ABNORMALITIES OF GAIT AND MOBILITY: ICD-10-CM

## 2021-10-22 DIAGNOSIS — I27.21 SECONDARY PULMONARY ARTERIAL HYPERTENSION: ICD-10-CM

## 2021-10-22 DIAGNOSIS — R06.02 SHORTNESS OF BREATH: ICD-10-CM

## 2021-10-22 PROCEDURE — 99214 OFFICE O/P EST MOD 30 MIN: CPT

## 2021-10-22 NOTE — HISTORY OF PRESENT ILLNESS
[TextBox_4] : Mr. BROWER is a 86 year male with a history of CAD, iliac artery aneurysm, AICD, CHF, dilated cardiomyopathy, GERD, AFib, HTN, hypothyroid, HLD, obesity, CJ, PVD, venous stasis changes, who now comes in for a follow-up pulmonary evaluation. His chief complaint is\par -he notes taking Trelegy and noticed some throat irritation which he thinks is from the Trelegy\par -he notes a slight intermittent cough every now and then\par -he notes some constipation\par -he notes getting about 8-9 hours of sleep per night\par -he notes some difficulty going back to sleep if he wakes up in the middle of the night\par -he denies exercising\par -he notes his energy level is 8/10\par -his wife notes his snoring is not so bad\par -he notes during the day his sinuses are fine but when he lays down at night, he has nasal congestion\par -he notes denies gargling after using his inhaler\par -he notes some knee pain and he is getting injections every few months\par -he notes wanting to lose some weight\par -he denies doing the catheterization\par -he notes going for a Coumadin check\par \par - He  denies any visual issues, headaches, nausea, vomiting, fever, chills, sweats, chest pains, chest pressure, diarrhea, dysphagia, myalgia, dizziness, leg swelling, leg pain, itchy eyes, itchy ears, heartburn, reflux, or sour taste in the mouth.

## 2021-10-22 NOTE — ASSESSMENT
[FreeTextEntry1] : Mr. BROWER is a 86 year male with a history of CAD, iliac artery aneurysm, AICD, CHF, dilated cardiomyopathy, GERD, AFib, HTN, hypothyroid, HLD, obesity, CJ, PVD, venous stasis changes, who now comes in for a follow-up pulmonary evaluation SOB\par \par \par The patient's SOB is felt to be multifactorial:\par -poor mechanics of breathing\par -out of shape/overweight\par -Anemia\par -Pulmonary\par  -pulmonary arterial hypertension (PAH) ?WHO Class 1, 2, 3\par  -mild asthma\par  -restrictive dysfunction\par \par -Cardiac\par  -AFib\par  -cardiomyopathy\par \par Problem 1: ?PAH\par -Complete a right heart catheterization (Dr. Riggs or one of the other interventional cardiologists in the group) (non compliant)\par -Pulmonary hypertension is a disorder of the pulmonary arteries. It is important to distinguish the difference between pulmonary arterial hypertension which is idiopathic versus secondary pulmonary hypertension which is related to heart disease being diastolic dysfunction or congestive heart failure or other forms of pulmonary hypertension. Diagnostics include an initial echocardiogram evaluating the pulmonary artery pressures, if this is abnormal, to proceed with a VQ scan as well as a CTPA and an eventual right heart catheterization to absolutely confirm the echocardiogram findings. (No medication can be prescribed until the right heart catheterization). If present, the evaluation will include rheumatological blood testing, HIV testing, and potential evaluation for cirrhosis. Drug classes include PED5 (Revatio, Adcirca) ETRA (Tracleer, Macitentan, Letairis), Soluble guanylate cyclase (Adempas) Prostacyclins (Uptravi, Tyavso, Ventavis, Remodulin, or Orenitram derivatives). \par \par Problem 2: Asthma based on PFT (4/16/2021)\par -Complete full PFTs to follow\par -add Trelegy 200 1 inhalation qD \par - Inhaler technique reviewed as well as oral hygiene technique reviewed with patient. Avoidance of cold air, extremes of temperature, rescue inhaler should be used before exercise. Order of medication reviewed with patient. Recommended use of a cool mist humidifier in the bedroom. \par - Asthma is believed to be caused by inherited (genetic) and environmental factor, but its exact cause is unknown. asthma may be triggered by allergens, lung infections, or irritants in the air. Asthma triggers are different for each person \par \par Problem 3: GERD\par -continue Protonix 40 mg QAM, pre-breakfast\par -Rule of 2s: avoid eating too much, eating too late, eating too spicy, eating two hours before bed.\par - Things to avoid including overeating, spicy foods, tight clothing, eating within two hours of bed, this list is not all inclusive.\par - For treatments of reflux, possible options discussed including diet control, H2 blockers, PPIs, as well as coating motility agents discussed as treatment options. Timing of meals and proximity of last meal to sleep were discussed. If symptoms persist, a formal gastrointestinal evaluation is needed. \par \par Problem 5: (+) CJ\par -s/p a split night sleep study - CPAP 10cm Air Curve 10 cm\par -Sleep apnea is associated with adverse clinical consequences which can affect most organ systems. Cardiovascular disease risk includes arrhythmias, atrial fibrillation, hypertension, coronary artery disease, and stroke. Metabolic disorders include diabetes type 2, non-alcoholic fatty liver disease. Mood disorder especially depression; and cognitive decline especially in the elderly. Associations with chronic reflux/Mata’s esophagus some but not all inclusive. \par -Reasons include arousal consistent with hypopnea; respiratory events most prominent in REM sleep or supine position; therefore sleep staging and body position are important for accurate diagnosis and estimation of AHI. \par  \par Problem 6: Poor Balance\par -Script for balance therapy given\par \par Problem 7: Cardiac\par -Recommend cardiac follow up evaluation with cardiologist Dr. Razo if needed \par \par Problem 8: overweight/out of shape\par -recommended "MUNIQ" OTC supplement \par - Weight loss, exercise and diet control were discussed and are highly encouraged. Treatment options were given such as aqua therapy, and contacting a nutritionist. Recommended to use the elliptical, stationary bike, less use of treadmill. Mindful eating was explained to the patient. Obesity is associated with worsening asthma, SOB, and potential for cardiac disease, diabetes, and other underlying medical conditions.\par \par Problem 9: Poor mechanics of breathing\par -Recommend Wim Hof and Buteyko breathing techniques\par - Proper breathing techniques were reviewed with an emphasis on exhalation. Patient instructed to breath in for 1 second and out for four seconds. Patient was encouraged not to talk while walking.\par \par Problem : Health Maintenance\par -s/p COVID-19 vaccine x2\par -s/p flu shot 2021\par -recommended strep pneumonia vaccines: Prevnar-13 vaccine, follow by Pneumo vaccine 23 one year following\par -recommended early intervention for URIs\par -recommended regular osteoporosis evaluations\par -recommended early dermatological evaluations\par -recommended after the age of 50 to the age of 70, colonoscopy every 5 years\par \par f/u in 6-8 weeks\par pt is encouraged to call or fax the office with any questions or concerns.\par

## 2021-10-22 NOTE — PROCEDURE
[FreeTextEntry1] : Split Diagnostic Sleep study (9/19/2021) revealed sleep apnea with an AHI/LACIE of 52.8, and a low oxygen saturation of 66%

## 2021-10-22 NOTE — REASON FOR VISIT
[Follow-Up] : a follow-up visit [Spouse] : spouse [Family Member] : family member [TextBox_44] : SOB; Likely asthma, PAH, OSAS

## 2021-10-22 NOTE — PHYSICAL EXAM
[No Acute Distress] : no acute distress [Normal Oropharynx] : normal oropharynx [III] : Mallampati Class: III [Normal Appearance] : normal appearance [No Neck Mass] : no neck mass [Normal Rate/Rhythm] : normal rate/rhythm [Normal S1, S2] : normal s1, s2 [No Murmurs] : no murmurs [No Resp Distress] : no resp distress [Clear to Auscultation Bilaterally] : clear to auscultation bilaterally [No Abnormalities] : no abnormalities [Benign] : benign [Normal Gait] : normal gait [No Clubbing] : no clubbing [No Cyanosis] : no cyanosis [No Edema] : no edema [FROM] : FROM [Normal Color/ Pigmentation] : normal color/ pigmentation [No Focal Deficits] : no focal deficits [Oriented x3] : oriented x3 [Normal Affect] : normal affect [TextBox_2] : OW [TextBox_68] : I:E 1:3, clear  [TextBox_105] : 1-2+ LE edema

## 2021-10-22 NOTE — ADDENDUM
[FreeTextEntry1] : Documented by Edin Helm acting as a scribe for Dr. George Dunlap on 10/22/2021 \par \par All medical record entries made by the Scribe were at my, Dr. George Dunlap's, direction and personally dictated by me on 10/22/2021 . I have reviewed the chart and agree that the record accurately reflects my personal performance of the history, physical exam, assessment and plan. I have also personally directed, reviewed, and agree with the discharge instructions. \par

## 2021-10-25 ENCOUNTER — APPOINTMENT (OUTPATIENT)
Dept: CARDIOLOGY | Facility: CLINIC | Age: 86
End: 2021-10-25
Payer: MEDICARE

## 2021-10-25 LAB
INR PPP: 2.5 RATIO
POCT-PROTHROMBIN TIME: 30.5 SECS
QUALITY CONTROL: YES

## 2021-10-25 PROCEDURE — 99211 OFF/OP EST MAY X REQ PHY/QHP: CPT | Mod: 25

## 2021-10-25 PROCEDURE — 85610 PROTHROMBIN TIME: CPT | Mod: QW

## 2021-11-10 LAB
BASOPHILS # BLD AUTO: 0.02 K/UL
BASOPHILS NFR BLD AUTO: 0.5 %
EOSINOPHIL # BLD AUTO: 0.04 K/UL
EOSINOPHIL NFR BLD AUTO: 1 %
FERRITIN SERPL-MCNC: 145 NG/ML
FOLATE SERPL-MCNC: >20 NG/ML
HAPTOGLOB SERPL-MCNC: 169 MG/DL
HCT VFR BLD CALC: 37.4 %
HGB BLD-MCNC: 11.9 G/DL
IMM GRANULOCYTES NFR BLD AUTO: 1.8 %
IRON SATN MFR SERPL: 31 %
IRON SERPL-MCNC: 94 UG/DL
LYMPHOCYTES # BLD AUTO: 0.86 K/UL
LYMPHOCYTES NFR BLD AUTO: 22.5 %
MAN DIFF?: NORMAL
MCHC RBC-ENTMCNC: 31.8 GM/DL
MCHC RBC-ENTMCNC: 33.1 PG
MCV RBC AUTO: 103.9 FL
MONOCYTES # BLD AUTO: 0.88 K/UL
MONOCYTES NFR BLD AUTO: 23 %
NEUTROPHILS # BLD AUTO: 1.96 K/UL
NEUTROPHILS NFR BLD AUTO: 51.2 %
PLATELET # BLD AUTO: 111 K/UL
RBC # BLD: 3.6 M/UL
RBC # BLD: 3.6 M/UL
RBC # FLD: 13.7 %
RETICS # AUTO: 1.4 %
RETICS AGGREG/RBC NFR: 50.8 K/UL
TIBC SERPL-MCNC: 301 UG/DL
UIBC SERPL-MCNC: 207 UG/DL
VIT B12 SERPL-MCNC: 657 PG/ML
WBC # FLD AUTO: 3.83 K/UL

## 2021-11-17 ENCOUNTER — NON-APPOINTMENT (OUTPATIENT)
Age: 86
End: 2021-11-17

## 2021-11-18 ENCOUNTER — APPOINTMENT (OUTPATIENT)
Dept: CARDIOLOGY | Facility: CLINIC | Age: 86
End: 2021-11-18
Payer: MEDICARE

## 2021-11-18 PROCEDURE — 93289 INTERROG DEVICE EVAL HEART: CPT

## 2021-11-18 NOTE — REASON FOR VISIT
[Follow-up Device Check] : is here today for a follow-up device check visit for [Arrhythmias (seen on stored data)] : arrhythmias seen on stored data [Lead Recall] : a lead recall [Spouse] : spouse

## 2021-11-22 ENCOUNTER — APPOINTMENT (OUTPATIENT)
Dept: CARDIOLOGY | Facility: CLINIC | Age: 86
End: 2021-11-22
Payer: MEDICARE

## 2021-11-22 LAB
INR PPP: 1.9 RATIO
POCT-PROTHROMBIN TIME: 22.1 SECS
QUALITY CONTROL: YES

## 2021-11-22 PROCEDURE — 99211 OFF/OP EST MAY X REQ PHY/QHP: CPT | Mod: 25

## 2021-11-22 PROCEDURE — 85610 PROTHROMBIN TIME: CPT | Mod: QW

## 2021-11-24 ENCOUNTER — OUTPATIENT (OUTPATIENT)
Dept: OUTPATIENT SERVICES | Facility: HOSPITAL | Age: 86
LOS: 1 days | Discharge: HOME | End: 2021-11-24

## 2021-11-24 DIAGNOSIS — Z98.89 OTHER SPECIFIED POSTPROCEDURAL STATES: Chronic | ICD-10-CM

## 2021-11-24 DIAGNOSIS — D64.9 ANEMIA, UNSPECIFIED: ICD-10-CM

## 2021-11-24 NOTE — PHYSICAL EXAM
[General Appearance - Well Developed] : well developed [Well Groomed] : well groomed [General Appearance - Well Nourished] : well nourished [General Appearance - In No Acute Distress] : no acute distress [Edema] : no peripheral edema present [Respiration, Rhythm And Depth] : normal respiratory rhythm and effort [Exaggerated Use Of Accessory Muscles For Inspiration] : no accessory muscle use [Auscultation Breath Sounds / Voice Sounds] : lungs were clear to auscultation bilaterally [Left Infraclavicular] : left infraclavicular area [Abdomen Soft] : soft [Abdomen Tenderness] : non-tender [Abdomen Mass (___ Cm)] : no abdominal mass palpated [Well Developed] : well developed [Well Nourished] : well nourished [No Acute Distress] : no acute distress [Normal Venous Pressure] : normal venous pressure [No Carotid Bruit] : no carotid bruit [Normal S1, S2] : normal S1, S2 [No Murmur] : no murmur [No Rub] : no rub [No Gallop] : no gallop [Clear Lung Fields] : clear lung fields [Good Air Entry] : good air entry [No Respiratory Distress] : no respiratory distress  [Soft] : abdomen soft [Non Tender] : non-tender [No Masses/organomegaly] : no masses/organomegaly [Normal Bowel Sounds] : normal bowel sounds [Normal Gait] : normal gait [No Cyanosis] : no cyanosis [No Clubbing] : no clubbing [No Varicosities] : no varicosities [No Rash] : no rash [No Skin Lesions] : no skin lesions [Moves all extremities] : moves all extremities [No Focal Deficits] : no focal deficits [Normal Speech] : normal speech [Alert and Oriented] : alert and oriented [Normal memory] : normal memory [Normal Conjunctiva] : the conjunctiva exhibited no abnormalities [Eyelids - No Xanthelasma] : the eyelids demonstrated no xanthelasmas [Normal Oral Mucosa] : normal oral mucosa [No Oral Pallor] : no oral pallor [No Oral Cyanosis] : no oral cyanosis [Normal Jugular Venous A Waves Present] : normal jugular venous A waves present [Normal Jugular Venous V Waves Present] : normal jugular venous V waves present [No Jugular Venous Perze A Waves] : no jugular venous perez A waves [Skin Color & Pigmentation] : normal skin color and pigmentation [] : no rash [No Venous Stasis] : no venous stasis [Skin Lesions] : no skin lesions [No Skin Ulcers] : no skin ulcer [No Xanthoma] : no  xanthoma was observed [Oriented To Time, Place, And Person] : oriented to person, place, and time [Affect] : the affect was normal [Mood] : the mood was normal [No Anxiety] : not feeling anxious [de-identified] : bilateral 3 to 4+ [FreeTextEntry1] : debilitated gait due to arthritis

## 2021-11-24 NOTE — PROCEDURE
[Pacemaker] : pacemaker [VVIR] : VVIR [Charge Time: ___ sec] : charge time was [unfilled] seconds [Lead Imp:  ___ohms] : lead impedance was [unfilled] ohms [Sensing Amplitude ___mv] : sensing amplitude was [unfilled] mv [___V @] : [unfilled] V [___ ms] : [unfilled] ms [None] : none [de-identified] : [employer] [de-identified] : [employer]ified 335 7–4 OC CRT D [de-identified] : 60 [de-identified] : 5-5.8 yrs [de-identified] : dependent riata lead fluor 12/11/18 no externalization

## 2021-11-24 NOTE — HISTORY OF PRESENT ILLNESS
[de-identified] : Jj was notified that there was inhibition of his pacer during electrocautery for ENT surgery

## 2021-11-29 ENCOUNTER — APPOINTMENT (OUTPATIENT)
Dept: HEMATOLOGY ONCOLOGY | Facility: CLINIC | Age: 86
End: 2021-11-29

## 2021-12-06 ENCOUNTER — APPOINTMENT (OUTPATIENT)
Dept: CARDIOLOGY | Facility: CLINIC | Age: 86
End: 2021-12-06
Payer: MEDICARE

## 2021-12-06 LAB
INR PPP: 2.9 RATIO
POCT-PROTHROMBIN TIME: 34.7 SECS
QUALITY CONTROL: YES

## 2021-12-06 PROCEDURE — 99211 OFF/OP EST MAY X REQ PHY/QHP: CPT | Mod: 25

## 2021-12-06 PROCEDURE — 85610 PROTHROMBIN TIME: CPT | Mod: QW

## 2021-12-13 ENCOUNTER — APPOINTMENT (OUTPATIENT)
Dept: HEMATOLOGY ONCOLOGY | Facility: CLINIC | Age: 86
End: 2021-12-13
Payer: MEDICARE

## 2021-12-13 VITALS
SYSTOLIC BLOOD PRESSURE: 96 MMHG | HEART RATE: 70 BPM | TEMPERATURE: 97 F | HEIGHT: 70.98 IN | DIASTOLIC BLOOD PRESSURE: 62 MMHG | WEIGHT: 288.81 LBS | BODY MASS INDEX: 40.43 KG/M2 | OXYGEN SATURATION: 98 % | RESPIRATION RATE: 17 BRPM

## 2021-12-13 PROCEDURE — 99214 OFFICE O/P EST MOD 30 MIN: CPT

## 2021-12-13 NOTE — HISTORY OF PRESENT ILLNESS
[de-identified] : 2012–Pancytopenia with macrocytosis.\par \par 10/2016–Bone marrow biopsy and bone marrow aspirate showed a hypercellular bone marrow with trilineage hematopoiesis and maturation, increased iron stores.  Flow cytometry myeloid immunophenotypic findings showed no increase in myeloid immaturity.  The lymphocyte immunophenotypic findings showed T-cell predominance and rare B cells.  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 Alcazar who monitored his CBC with differential.\par \par 7/2020–Peripheral blood flow cytometry lymphocyte immunophenotypic findings showed no diagnostic abnormalities with lymphopenia.  Myeloid immunophenotypic findings showed normal myeloid granularity with no increase in myeloid immaturity.  No significant absolute lymphocytosis, monocytosis or neutropenia.  MDS FISH panel normal. [de-identified] : Follows with cardiology and pulmonary.\par No bleeding. No fevers, cough. No abdominal pain. No current c/o CP.\par Getting gel injections left knee.\par S/P Moh's surgery/skin graft for nose BCC.\par \par Got COVID vaccines (Moderna), along with booster last week.

## 2021-12-13 NOTE — PHYSICAL EXAM
[Normal] : affect appropriate [de-identified] : breathing appeared unlabored [de-identified] : +edema; no calf tenderness [de-identified] : bandage over nose

## 2021-12-13 NOTE — REVIEW OF SYSTEMS
[Negative] : Allergic/Immunologic [Chest Pain] : no chest pain [Joint Pain] : joint pain [de-identified] : s/p MOH's surgery of the nose

## 2021-12-13 NOTE — ASSESSMENT
[FreeTextEntry1] : Lab work, Dr. Dunlap's 10/22/21 pulmonary note reviewed.\par Patient with history of pancytopenia with elevated MCV and increased monocyte percentage–most recent  ANC WNL, hemoglobin stable.  Platelet count appears to be fluctuating/acceptable at present.\par It remains possible the patient has an underlying myelodysplastic process/CMML.  No overt bleeding noted clinically currently. Patient is agreeable to interval follow-up.  \par \par Should hematologic scenario worsen/change, then further evaluation with follow-up BMB would be warranted should he wish to pursue this.\par \par Patient to continue p.o. vitamin B12 supplement for h/o vitamin B-12 level less than 400, and p.o. folic acid (h/o slightly elevated homocysteine level).\par \par Patient and his wife were given the opportunity to ask questions.  Their questions have been answered to their apparent satisfaction at this time.

## 2021-12-17 ENCOUNTER — RX RENEWAL (OUTPATIENT)
Age: 86
End: 2021-12-17

## 2021-12-28 ENCOUNTER — APPOINTMENT (OUTPATIENT)
Dept: ELECTROPHYSIOLOGY | Facility: CLINIC | Age: 86
End: 2021-12-28
Payer: MEDICARE

## 2021-12-28 ENCOUNTER — NON-APPOINTMENT (OUTPATIENT)
Age: 86
End: 2021-12-28

## 2021-12-28 PROCEDURE — 93296 REM INTERROG EVL PM/IDS: CPT | Mod: NC

## 2021-12-28 PROCEDURE — 93295 DEV INTERROG REMOTE 1/2/MLT: CPT | Mod: NC

## 2021-12-29 ENCOUNTER — APPOINTMENT (OUTPATIENT)
Dept: CARDIOLOGY | Facility: CLINIC | Age: 86
End: 2021-12-29
Payer: MEDICARE

## 2021-12-29 ENCOUNTER — NON-APPOINTMENT (OUTPATIENT)
Age: 86
End: 2021-12-29

## 2021-12-29 VITALS
BODY MASS INDEX: 40.37 KG/M2 | DIASTOLIC BLOOD PRESSURE: 68 MMHG | SYSTOLIC BLOOD PRESSURE: 108 MMHG | TEMPERATURE: 97.3 F | HEART RATE: 73 BPM | RESPIRATION RATE: 19 BRPM | WEIGHT: 282 LBS | HEIGHT: 70 IN | OXYGEN SATURATION: 96 %

## 2021-12-29 PROCEDURE — 93000 ELECTROCARDIOGRAM COMPLETE: CPT

## 2021-12-29 PROCEDURE — 99212 OFFICE O/P EST SF 10 MIN: CPT

## 2021-12-29 NOTE — REASON FOR VISIT
[Structural Heart and Valve Disease] : structural heart and valve disease [FreeTextEntry1] : Jj is still recovery from mohs surgery. he has persistent leg edema. would emphasize leg elevation at night. seeing dr bowser.  ? role of diuretics in mobilize fluid. secondary to operation weight etc.

## 2021-12-29 NOTE — PHYSICAL EXAM
[Well Developed] : well developed [Well Nourished] : well nourished [No Acute Distress] : no acute distress [Normal Venous Pressure] : normal venous pressure [No Carotid Bruit] : no carotid bruit [Normal S1, S2] : normal S1, S2 [No Murmur] : no murmur [No Rub] : no rub [No Gallop] : no gallop [Clear Lung Fields] : clear lung fields [Good Air Entry] : good air entry [No Respiratory Distress] : no respiratory distress  [Soft] : abdomen soft [Non Tender] : non-tender [No Masses/organomegaly] : no masses/organomegaly [Normal Bowel Sounds] : normal bowel sounds [Normal Gait] : normal gait [No Cyanosis] : no cyanosis [No Clubbing] : no clubbing [No Varicosities] : no varicosities [No Rash] : no rash [No Skin Lesions] : no skin lesions [Moves all extremities] : moves all extremities [No Focal Deficits] : no focal deficits [Normal Speech] : normal speech [Alert and Oriented] : alert and oriented [Normal memory] : normal memory [General Appearance - Well Developed] : well developed [Well Groomed] : well groomed [General Appearance - Well Nourished] : well nourished [General Appearance - In No Acute Distress] : no acute distress [Edema] : no peripheral edema present [Respiration, Rhythm And Depth] : normal respiratory rhythm and effort [Exaggerated Use Of Accessory Muscles For Inspiration] : no accessory muscle use [Auscultation Breath Sounds / Voice Sounds] : lungs were clear to auscultation bilaterally [Left Infraclavicular] : left infraclavicular area [Abdomen Soft] : soft [Abdomen Tenderness] : non-tender [Abdomen Mass (___ Cm)] : no abdominal mass palpated [Normal Conjunctiva] : the conjunctiva exhibited no abnormalities [Eyelids - No Xanthelasma] : the eyelids demonstrated no xanthelasmas [Normal Oral Mucosa] : normal oral mucosa [No Oral Pallor] : no oral pallor [No Oral Cyanosis] : no oral cyanosis [Normal Jugular Venous A Waves Present] : normal jugular venous A waves present [Normal Jugular Venous V Waves Present] : normal jugular venous V waves present [No Jugular Venous Perez A Waves] : no jugular venous perez A waves [Skin Color & Pigmentation] : normal skin color and pigmentation [] : no rash [No Venous Stasis] : no venous stasis [Skin Lesions] : no skin lesions [No Skin Ulcers] : no skin ulcer [No Xanthoma] : no  xanthoma was observed [Oriented To Time, Place, And Person] : oriented to person, place, and time [Affect] : the affect was normal [Mood] : the mood was normal [No Anxiety] : not feeling anxious [de-identified] : bilateral 3 to 4+ [FreeTextEntry1] : debilitated gait due to arthritis

## 2021-12-29 NOTE — ASSESSMENT
[FreeTextEntry1] : periodic pacer assess\par leg elevation \par salt restrict\par anticoagulate ? role in wound healing nose\par

## 2022-01-01 ENCOUNTER — LABORATORY RESULT (OUTPATIENT)
Age: 87
End: 2022-01-01

## 2022-01-01 ENCOUNTER — RX RENEWAL (OUTPATIENT)
Age: 87
End: 2022-01-01

## 2022-01-01 ENCOUNTER — NON-APPOINTMENT (OUTPATIENT)
Age: 87
End: 2022-01-01

## 2022-01-01 ENCOUNTER — APPOINTMENT (OUTPATIENT)
Dept: ELECTROPHYSIOLOGY | Facility: CLINIC | Age: 87
End: 2022-01-01

## 2022-01-01 ENCOUNTER — APPOINTMENT (OUTPATIENT)
Dept: FAMILY MEDICINE | Facility: CLINIC | Age: 87
End: 2022-01-01

## 2022-01-01 ENCOUNTER — APPOINTMENT (OUTPATIENT)
Dept: CARDIOLOGY | Facility: CLINIC | Age: 87
End: 2022-01-01

## 2022-01-01 ENCOUNTER — APPOINTMENT (OUTPATIENT)
Dept: CARDIOLOGY | Facility: CLINIC | Age: 87
End: 2022-01-01
Payer: MEDICARE

## 2022-01-01 ENCOUNTER — APPOINTMENT (OUTPATIENT)
Dept: HEMATOLOGY ONCOLOGY | Facility: CLINIC | Age: 87
End: 2022-01-01

## 2022-01-01 ENCOUNTER — APPOINTMENT (OUTPATIENT)
Dept: RADIOLOGY | Facility: HOSPITAL | Age: 87
End: 2022-01-01

## 2022-01-01 ENCOUNTER — OUTPATIENT (OUTPATIENT)
Dept: OUTPATIENT SERVICES | Facility: HOSPITAL | Age: 87
LOS: 1 days | Discharge: HOME | End: 2022-01-01

## 2022-01-01 ENCOUNTER — EMERGENCY (EMERGENCY)
Facility: HOSPITAL | Age: 87
LOS: 1 days | Discharge: HOME | End: 2022-01-01
Attending: EMERGENCY MEDICINE | Admitting: EMERGENCY MEDICINE
Payer: MEDICARE

## 2022-01-01 ENCOUNTER — OUTPATIENT (OUTPATIENT)
Dept: OUTPATIENT SERVICES | Facility: HOSPITAL | Age: 87
LOS: 1 days | End: 2022-01-01
Payer: MEDICARE

## 2022-01-01 VITALS
RESPIRATION RATE: 18 BRPM | SYSTOLIC BLOOD PRESSURE: 148 MMHG | DIASTOLIC BLOOD PRESSURE: 68 MMHG | HEIGHT: 74 IN | HEART RATE: 60 BPM | OXYGEN SATURATION: 95 % | TEMPERATURE: 98 F | WEIGHT: 265 LBS

## 2022-01-01 VITALS
WEIGHT: 259 LBS | RESPIRATION RATE: 16 BRPM | HEIGHT: 74 IN | SYSTOLIC BLOOD PRESSURE: 144 MMHG | TEMPERATURE: 97.3 F | DIASTOLIC BLOOD PRESSURE: 75 MMHG | HEART RATE: 71 BPM | OXYGEN SATURATION: 95 % | BODY MASS INDEX: 33.24 KG/M2

## 2022-01-01 VITALS
TEMPERATURE: 97.6 F | SYSTOLIC BLOOD PRESSURE: 143 MMHG | BODY MASS INDEX: 34.67 KG/M2 | RESPIRATION RATE: 22 BRPM | OXYGEN SATURATION: 96 % | DIASTOLIC BLOOD PRESSURE: 81 MMHG | WEIGHT: 270 LBS | HEART RATE: 60 BPM

## 2022-01-01 VITALS
HEART RATE: 65 BPM | TEMPERATURE: 97.1 F | RESPIRATION RATE: 18 BRPM | SYSTOLIC BLOOD PRESSURE: 127 MMHG | OXYGEN SATURATION: 97 % | DIASTOLIC BLOOD PRESSURE: 75 MMHG | WEIGHT: 263 LBS | HEIGHT: 74 IN | BODY MASS INDEX: 33.75 KG/M2

## 2022-01-01 VITALS
HEIGHT: 74 IN | DIASTOLIC BLOOD PRESSURE: 82 MMHG | HEART RATE: 61 BPM | TEMPERATURE: 97.4 F | OXYGEN SATURATION: 97 % | BODY MASS INDEX: 34.39 KG/M2 | RESPIRATION RATE: 16 BRPM | WEIGHT: 268 LBS | SYSTOLIC BLOOD PRESSURE: 155 MMHG

## 2022-01-01 VITALS
HEIGHT: 74 IN | WEIGHT: 170 LBS | BODY MASS INDEX: 21.82 KG/M2 | SYSTOLIC BLOOD PRESSURE: 118 MMHG | HEART RATE: 59 BPM | TEMPERATURE: 98.6 F | RESPIRATION RATE: 20 BRPM | DIASTOLIC BLOOD PRESSURE: 70 MMHG | OXYGEN SATURATION: 97 %

## 2022-01-01 VITALS
DIASTOLIC BLOOD PRESSURE: 62 MMHG | SYSTOLIC BLOOD PRESSURE: 137 MMHG | TEMPERATURE: 97.6 F | RESPIRATION RATE: 18 BRPM | HEART RATE: 60 BPM | OXYGEN SATURATION: 100 %

## 2022-01-01 VITALS
HEIGHT: 74 IN | WEIGHT: 272 LBS | BODY MASS INDEX: 34.91 KG/M2 | OXYGEN SATURATION: 93 % | RESPIRATION RATE: 16 BRPM | TEMPERATURE: 97.3 F | SYSTOLIC BLOOD PRESSURE: 103 MMHG | HEART RATE: 81 BPM | DIASTOLIC BLOOD PRESSURE: 56 MMHG

## 2022-01-01 VITALS — WEIGHT: 164 LBS | BODY MASS INDEX: 21.06 KG/M2

## 2022-01-01 VITALS — TEMPERATURE: 98.6 F

## 2022-01-01 DIAGNOSIS — J06.9 ACUTE UPPER RESPIRATORY INFECTION, UNSPECIFIED: ICD-10-CM

## 2022-01-01 DIAGNOSIS — L30.9 DERMATITIS, UNSPECIFIED: ICD-10-CM

## 2022-01-01 DIAGNOSIS — Z86.19 PERSONAL HISTORY OF OTHER INFECTIOUS AND PARASITIC DISEASES: ICD-10-CM

## 2022-01-01 DIAGNOSIS — R26.81 UNSTEADINESS ON FEET: ICD-10-CM

## 2022-01-01 DIAGNOSIS — Z98.89 OTHER SPECIFIED POSTPROCEDURAL STATES: Chronic | ICD-10-CM

## 2022-01-01 DIAGNOSIS — K59.09 OTHER CONSTIPATION: ICD-10-CM

## 2022-01-01 DIAGNOSIS — Z23 ENCOUNTER FOR IMMUNIZATION: ICD-10-CM

## 2022-01-01 DIAGNOSIS — R05.3 CHRONIC COUGH: ICD-10-CM

## 2022-01-01 DIAGNOSIS — D64.9 ANEMIA, UNSPECIFIED: ICD-10-CM

## 2022-01-01 DIAGNOSIS — R05.9 COUGH, UNSPECIFIED: ICD-10-CM

## 2022-01-01 DIAGNOSIS — Z00.00 ENCOUNTER FOR GENERAL ADULT MEDICAL EXAMINATION W/OUT ABNORMAL FINDINGS: ICD-10-CM

## 2022-01-01 DIAGNOSIS — M65.339 TRIGGER FINGER, UNSPECIFIED MIDDLE FINGER: ICD-10-CM

## 2022-01-01 DIAGNOSIS — E66.9 OBESITY, UNSPECIFIED: ICD-10-CM

## 2022-01-01 LAB
ALBUMIN SERPL ELPH-MCNC: 3.9 G/DL — SIGNIFICANT CHANGE UP (ref 3.3–5)
ALP SERPL-CCNC: 71 U/L — SIGNIFICANT CHANGE UP (ref 40–120)
ALT FLD-CCNC: 35 U/L — SIGNIFICANT CHANGE UP (ref 10–45)
ANION GAP SERPL CALC-SCNC: 7 MMOL/L — SIGNIFICANT CHANGE UP (ref 5–17)
APTT BLD: 37.9 SEC — HIGH (ref 27.5–35.5)
AST SERPL-CCNC: 28 U/L — SIGNIFICANT CHANGE UP (ref 10–40)
BASOPHILS # BLD AUTO: 0.01 K/UL
BASOPHILS # BLD AUTO: 0.01 K/UL — SIGNIFICANT CHANGE UP (ref 0–0.2)
BASOPHILS # BLD AUTO: 0.02 K/UL
BASOPHILS # BLD AUTO: 0.05 K/UL
BASOPHILS NFR BLD AUTO: 0.2 %
BASOPHILS NFR BLD AUTO: 0.2 % — SIGNIFICANT CHANGE UP (ref 0–2)
BASOPHILS NFR BLD AUTO: 0.4 %
BASOPHILS NFR BLD AUTO: 0.9 %
BILIRUB SERPL-MCNC: 0.4 MG/DL — SIGNIFICANT CHANGE UP (ref 0.2–1.2)
BUN SERPL-MCNC: 29 MG/DL — HIGH (ref 7–23)
CALCIUM SERPL-MCNC: 8.8 MG/DL — SIGNIFICANT CHANGE UP (ref 8.4–10.5)
CHLORIDE SERPL-SCNC: 102 MMOL/L — SIGNIFICANT CHANGE UP (ref 96–108)
CO2 SERPL-SCNC: 28 MMOL/L — SIGNIFICANT CHANGE UP (ref 22–31)
CREAT SERPL-MCNC: 1.35 MG/DL — HIGH (ref 0.5–1.3)
EGFR: 51 ML/MIN/1.73M2 — LOW
EOSINOPHIL # BLD AUTO: 0 K/UL
EOSINOPHIL # BLD AUTO: 0.01 K/UL
EOSINOPHIL # BLD AUTO: 0.02 K/UL
EOSINOPHIL # BLD AUTO: 0.02 K/UL — SIGNIFICANT CHANGE UP (ref 0–0.5)
EOSINOPHIL NFR BLD AUTO: 0 %
EOSINOPHIL NFR BLD AUTO: 0.2 %
EOSINOPHIL NFR BLD AUTO: 0.3 % — SIGNIFICANT CHANGE UP (ref 0–6)
EOSINOPHIL NFR BLD AUTO: 0.4 %
FERRITIN SERPL-MCNC: 102 NG/ML
FERRITIN SERPL-MCNC: 104 NG/ML
FERRITIN SERPL-MCNC: 200 NG/ML
FOLATE SERPL-MCNC: >20 NG/ML
FOLATE SERPL-MCNC: >20 NG/ML
GLUCOSE SERPL-MCNC: 119 MG/DL — HIGH (ref 70–99)
HAPTOGLOB SERPL-MCNC: 225 MG/DL
HCT VFR BLD CALC: 35.1 %
HCT VFR BLD CALC: 35.4 %
HCT VFR BLD CALC: 36.1 %
HCT VFR BLD CALC: 36.8 % — LOW (ref 39–50)
HGB BLD-MCNC: 11.2 G/DL
HGB BLD-MCNC: 11.4 G/DL
HGB BLD-MCNC: 12 G/DL
HGB BLD-MCNC: 12.3 G/DL — LOW (ref 13–17)
IMM GRANULOCYTES NFR BLD AUTO: 2.9 %
IMM GRANULOCYTES NFR BLD AUTO: 3.4 %
IMM GRANULOCYTES NFR BLD AUTO: 5.3 % — HIGH (ref 0–1.5)
INFLUENZA A RESULT: NOT DETECTED
INFLUENZA B RESULT: NOT DETECTED
INR BLD: 1.31 RATIO — HIGH (ref 0.88–1.16)
INR PPP: 1.3
INR PPP: 1.5
INR PPP: 1.6
INR PPP: 1.8 RATIO
INR PPP: 2
INR PPP: 2.06 RATIO
INR PPP: 2.2
INR PPP: 3.8 RATIO
IRON SATN MFR SERPL: 22 %
IRON SATN MFR SERPL: 28 %
IRON SATN MFR SERPL: 29 %
IRON SERPL-MCNC: 56 UG/DL
IRON SERPL-MCNC: 92 UG/DL
IRON SERPL-MCNC: 92 UG/DL
LYMPHOCYTES # BLD AUTO: 0.52 K/UL
LYMPHOCYTES # BLD AUTO: 0.83 K/UL
LYMPHOCYTES # BLD AUTO: 0.85 K/UL
LYMPHOCYTES # BLD AUTO: 0.91 K/UL — LOW (ref 1–3.3)
LYMPHOCYTES # BLD AUTO: 14.7 % — SIGNIFICANT CHANGE UP (ref 13–44)
LYMPHOCYTES NFR BLD AUTO: 17.4 %
LYMPHOCYTES NFR BLD AUTO: 18 %
LYMPHOCYTES NFR BLD AUTO: 9.6 %
MAN DIFF?: NORMAL
MCHC RBC-ENTMCNC: 31.9 GM/DL
MCHC RBC-ENTMCNC: 32.1 PG
MCHC RBC-ENTMCNC: 32.2 GM/DL
MCHC RBC-ENTMCNC: 32.5 PG
MCHC RBC-ENTMCNC: 33.1 PG — SIGNIFICANT CHANGE UP (ref 27–34)
MCHC RBC-ENTMCNC: 33.2 GM/DL
MCHC RBC-ENTMCNC: 33.4 GM/DL — SIGNIFICANT CHANGE UP (ref 32–36)
MCHC RBC-ENTMCNC: 34.3 PG
MCV RBC AUTO: 100.6 FL
MCV RBC AUTO: 100.9 FL
MCV RBC AUTO: 103.1 FL
MCV RBC AUTO: 98.9 FL — SIGNIFICANT CHANGE UP (ref 80–100)
MONOCYTES # BLD AUTO: 0.75 K/UL
MONOCYTES # BLD AUTO: 0.85 K/UL
MONOCYTES # BLD AUTO: 0.91 K/UL
MONOCYTES # BLD AUTO: 0.98 K/UL — HIGH (ref 0–0.9)
MONOCYTES NFR BLD AUTO: 13.9 %
MONOCYTES NFR BLD AUTO: 15.9 % — HIGH (ref 2–14)
MONOCYTES NFR BLD AUTO: 18 %
MONOCYTES NFR BLD AUTO: 19.1 %
NEUTROPHILS # BLD AUTO: 2.83 K/UL
NEUTROPHILS # BLD AUTO: 2.85 K/UL
NEUTROPHILS # BLD AUTO: 3.92 K/UL — SIGNIFICANT CHANGE UP (ref 1.8–7.4)
NEUTROPHILS # BLD AUTO: 4.09 K/UL
NEUTROPHILS NFR BLD AUTO: 60 %
NEUTROPHILS NFR BLD AUTO: 60 %
NEUTROPHILS NFR BLD AUTO: 63.6 % — SIGNIFICANT CHANGE UP (ref 43–77)
NEUTROPHILS NFR BLD AUTO: 75.6 %
NRBC # BLD: 0 /100 WBCS — SIGNIFICANT CHANGE UP (ref 0–0)
PLATELET # BLD AUTO: 103 K/UL — LOW (ref 150–400)
PLATELET # BLD AUTO: 106 K/UL
PLATELET # BLD AUTO: 108 K/UL
PLATELET # BLD AUTO: 109 K/UL
POCT-PROTHROMBIN TIME: 16.9 SECS
POCT-PROTHROMBIN TIME: 46.1 SECS
POTASSIUM SERPL-MCNC: 3.9 MMOL/L — SIGNIFICANT CHANGE UP (ref 3.5–5.3)
POTASSIUM SERPL-SCNC: 3.9 MMOL/L — SIGNIFICANT CHANGE UP (ref 3.5–5.3)
PROT SERPL-MCNC: 7.7 G/DL — SIGNIFICANT CHANGE UP (ref 6–8.3)
PROTHROM AB SERPL-ACNC: 15.2 SEC — HIGH (ref 10.5–13.4)
PT BLD: 17.1
PT BLD: 18.4
PT BLD: 19.8
PT BLD: 21.6
PT BLD: 24.3 SEC
PT BLD: 25.9
QUALITY CONTROL: YES
QUALITY CONTROL: YES
RBC # BLD: 3.49 M/UL
RBC # BLD: 3.49 M/UL
RBC # BLD: 3.5 M/UL
RBC # BLD: 3.5 M/UL
RBC # BLD: 3.51 M/UL
RBC # BLD: 3.51 M/UL
RBC # BLD: 3.72 M/UL — LOW (ref 4.2–5.8)
RBC # FLD: 13.5 %
RBC # FLD: 13.5 % — SIGNIFICANT CHANGE UP (ref 10.3–14.5)
RBC # FLD: 13.8 %
RBC # FLD: 13.9 %
RESP SYN VIRUS RESULT: NOT DETECTED
RETICS # AUTO: 1.5 %
RETICS # AUTO: 1.5 %
RETICS # AUTO: 2 %
RETICS AGGREG/RBC NFR: 51 K/UL
RETICS AGGREG/RBC NFR: 53.7 K/UL
RETICS AGGREG/RBC NFR: 69.3 K/UL
SARS-COV-2 RESULT: NOT DETECTED
SODIUM SERPL-SCNC: 137 MMOL/L — SIGNIFICANT CHANGE UP (ref 135–145)
TIBC SERPL-MCNC: 248 UG/DL
TIBC SERPL-MCNC: 314 UG/DL
TIBC SERPL-MCNC: 324 UG/DL
UIBC SERPL-MCNC: 192 UG/DL
UIBC SERPL-MCNC: 222 UG/DL
UIBC SERPL-MCNC: 232 UG/DL
VIT B12 SERPL-MCNC: 682 PG/ML
VIT B12 SERPL-MCNC: 919 PG/ML
WBC # BLD: 6.17 K/UL — SIGNIFICANT CHANGE UP (ref 3.8–10.5)
WBC # FLD AUTO: 4.72 K/UL
WBC # FLD AUTO: 4.76 K/UL
WBC # FLD AUTO: 5.41 K/UL
WBC # FLD AUTO: 6.17 K/UL — SIGNIFICANT CHANGE UP (ref 3.8–10.5)

## 2022-01-01 PROCEDURE — 99211 OFF/OP EST MAY X REQ PHY/QHP: CPT | Mod: 25

## 2022-01-01 PROCEDURE — 85610 PROTHROMBIN TIME: CPT | Mod: QW

## 2022-01-01 PROCEDURE — G0008: CPT

## 2022-01-01 PROCEDURE — 93289 INTERROG DEVICE EVAL HEART: CPT

## 2022-01-01 PROCEDURE — 90662 IIV NO PRSV INCREASED AG IM: CPT

## 2022-01-01 PROCEDURE — 36415 COLL VENOUS BLD VENIPUNCTURE: CPT

## 2022-01-01 PROCEDURE — 93015 CV STRESS TEST SUPVJ I&R: CPT

## 2022-01-01 PROCEDURE — 99214 OFFICE O/P EST MOD 30 MIN: CPT

## 2022-01-01 PROCEDURE — 80053 COMPREHEN METABOLIC PANEL: CPT

## 2022-01-01 PROCEDURE — 93000 ELECTROCARDIOGRAM COMPLETE: CPT

## 2022-01-01 PROCEDURE — 85025 COMPLETE CBC W/AUTO DIFF WBC: CPT

## 2022-01-01 PROCEDURE — 78452 HT MUSCLE IMAGE SPECT MULT: CPT

## 2022-01-01 PROCEDURE — 99213 OFFICE O/P EST LOW 20 MIN: CPT

## 2022-01-01 PROCEDURE — 71046 X-RAY EXAM CHEST 2 VIEWS: CPT | Mod: 26

## 2022-01-01 PROCEDURE — 93295 DEV INTERROG REMOTE 1/2/MLT: CPT

## 2022-01-01 PROCEDURE — A9500: CPT

## 2022-01-01 PROCEDURE — 99213 OFFICE O/P EST LOW 20 MIN: CPT | Mod: 95

## 2022-01-01 PROCEDURE — 93296 REM INTERROG EVL PM/IDS: CPT

## 2022-01-01 PROCEDURE — 99214 OFFICE O/P EST MOD 30 MIN: CPT | Mod: 25

## 2022-01-01 PROCEDURE — 99284 EMERGENCY DEPT VISIT MOD MDM: CPT | Mod: FS

## 2022-01-01 PROCEDURE — 93306 TTE W/DOPPLER COMPLETE: CPT

## 2022-01-01 PROCEDURE — 85730 THROMBOPLASTIN TIME PARTIAL: CPT

## 2022-01-01 PROCEDURE — 85610 PROTHROMBIN TIME: CPT

## 2022-01-01 PROCEDURE — 99283 EMERGENCY DEPT VISIT LOW MDM: CPT

## 2022-01-01 PROCEDURE — 71046 X-RAY EXAM CHEST 2 VIEWS: CPT

## 2022-01-01 RX ORDER — FLUTICASONE PROPIONATE 50 UG/1
50 SPRAY, METERED NASAL
Qty: 1 | Refills: 3 | Status: ACTIVE | COMMUNITY
Start: 2022-01-01 | End: 1900-01-01

## 2022-01-01 RX ORDER — OXYCODONE AND ACETAMINOPHEN 10; 325 MG/1; MG/1
10-325 TABLET ORAL
Qty: 28 | Refills: 0 | Status: ACTIVE | COMMUNITY
Start: 2022-01-01

## 2022-01-01 RX ORDER — POLYETHYLENE GLYCOL 3350 17 G/17G
17 POWDER, FOR SOLUTION ORAL TWICE DAILY
Qty: 1 | Refills: 3 | Status: ACTIVE | COMMUNITY
Start: 2022-01-01 | End: 1900-01-01

## 2022-01-01 RX ORDER — SENNOSIDES 8.6 MG/1
8.6 TABLET ORAL TWICE DAILY
Qty: 60 | Refills: 3 | Status: ACTIVE | COMMUNITY
Start: 2022-01-01 | End: 1900-01-01

## 2022-01-01 RX ORDER — TRAMADOL HYDROCHLORIDE 50 MG/1
50 TABLET, COATED ORAL
Qty: 14 | Refills: 0 | Status: ACTIVE | COMMUNITY
Start: 2022-01-01

## 2022-01-01 RX ORDER — NALOXEGOL OXALATE 25 MG/1
25 TABLET, FILM COATED ORAL
Qty: 30 | Refills: 0 | Status: ACTIVE | COMMUNITY
Start: 2022-01-01

## 2022-01-01 RX ORDER — FLUTICASONE FUROATE, UMECLIDINIUM BROMIDE AND VILANTEROL TRIFENATATE 200; 62.5; 25 UG/1; UG/1; UG/1
200-62.5-25 POWDER RESPIRATORY (INHALATION)
Qty: 3 | Refills: 0 | Status: ACTIVE | COMMUNITY
Start: 2021-05-10 | End: 1900-01-01

## 2022-01-01 RX ORDER — BENZONATATE 100 MG/1
100 CAPSULE ORAL
Qty: 60 | Refills: 1 | Status: ACTIVE | COMMUNITY
Start: 2022-01-01 | End: 1900-01-01

## 2022-01-03 ENCOUNTER — APPOINTMENT (OUTPATIENT)
Dept: CARDIOLOGY | Facility: CLINIC | Age: 87
End: 2022-01-03
Payer: MEDICARE

## 2022-01-03 ENCOUNTER — APPOINTMENT (OUTPATIENT)
Dept: FAMILY MEDICINE | Facility: CLINIC | Age: 87
End: 2022-01-03
Payer: MEDICARE

## 2022-01-03 VITALS
RESPIRATION RATE: 18 BRPM | HEIGHT: 70 IN | OXYGEN SATURATION: 98 % | TEMPERATURE: 97.2 F | DIASTOLIC BLOOD PRESSURE: 79 MMHG | SYSTOLIC BLOOD PRESSURE: 126 MMHG | BODY MASS INDEX: 40.23 KG/M2 | WEIGHT: 281 LBS | HEART RATE: 59 BPM

## 2022-01-03 DIAGNOSIS — G47.33 OBSTRUCTIVE SLEEP APNEA (ADULT) (PEDIATRIC): ICD-10-CM

## 2022-01-03 DIAGNOSIS — N50.812 LEFT TESTICULAR PAIN: ICD-10-CM

## 2022-01-03 LAB
INR PPP: 2.6 RATIO
POCT-PROTHROMBIN TIME: 30.8 SECS
QUALITY CONTROL: YES

## 2022-01-03 PROCEDURE — 99213 OFFICE O/P EST LOW 20 MIN: CPT

## 2022-01-03 PROCEDURE — 99211 OFF/OP EST MAY X REQ PHY/QHP: CPT | Mod: 25

## 2022-01-03 PROCEDURE — 85610 PROTHROMBIN TIME: CPT | Mod: QW

## 2022-01-03 NOTE — PHYSICAL EXAM
[No Acute Distress] : no acute distress [Well-Appearing] : well-appearing [No JVD] : no jugular venous distention [No Lymphadenopathy] : no lymphadenopathy [Supple] : supple [Clear to Auscultation] : lungs were clear to auscultation bilaterally [Normal S1, S2] : normal S1 and S2 [Soft] : abdomen soft [Non Tender] : non-tender [Non-distended] : non-distended [No CVA Tenderness] : no CVA  tenderness [No Spinal Tenderness] : no spinal tenderness [de-identified] : 2+ swel;ling bilateral legs wearing support stockings

## 2022-01-03 NOTE — HISTORY OF PRESENT ILLNESS
[Spouse] : spouse [FreeTextEntry1] : f/u [de-identified] : 86 y/o PMHX HTN HLD Obesity heart failure, Chronic bilateral leg edema, CAD, obesity recent Mohs surgery BCC nose here with wife f/u medical conditions  c/o left knee had tap done and fluid out Dr Weiss in [place]. Pt now doing better but persistent pain knee and LS spine  Also had injection done at same time #3 now improving pain. pt also notes has pain on and off sharp one testicle  left that comes and goes no noted any changes  pt also notes leg edema persists despite f/u diet and taking water pills also using support stockings. \par Pt is to get PT but awaiting COVID surge to go down to schedule.

## 2022-01-03 NOTE — ASSESSMENT
[FreeTextEntry1] : d/w Pt PT \par continue present meds no changes\par sono testes sent \par f/u prn or 4-6 months

## 2022-01-24 ENCOUNTER — NON-APPOINTMENT (OUTPATIENT)
Age: 87
End: 2022-01-24

## 2022-01-25 ENCOUNTER — NON-APPOINTMENT (OUTPATIENT)
Age: 87
End: 2022-01-25

## 2022-01-31 ENCOUNTER — APPOINTMENT (OUTPATIENT)
Dept: CARDIOLOGY | Facility: CLINIC | Age: 87
End: 2022-01-31
Payer: MEDICARE

## 2022-01-31 LAB
INR PPP: 4.3 RATIO
POCT-PROTHROMBIN TIME: 51.3 SECS
QUALITY CONTROL: YES

## 2022-01-31 PROCEDURE — 99211 OFF/OP EST MAY X REQ PHY/QHP: CPT | Mod: 25

## 2022-01-31 PROCEDURE — 85610 PROTHROMBIN TIME: CPT | Mod: QW

## 2022-02-14 ENCOUNTER — APPOINTMENT (OUTPATIENT)
Dept: FAMILY MEDICINE | Facility: CLINIC | Age: 87
End: 2022-02-14
Payer: MEDICARE

## 2022-02-14 VITALS
RESPIRATION RATE: 16 BRPM | DIASTOLIC BLOOD PRESSURE: 82 MMHG | HEART RATE: 67 BPM | BODY MASS INDEX: 39.32 KG/M2 | WEIGHT: 274 LBS | SYSTOLIC BLOOD PRESSURE: 144 MMHG | TEMPERATURE: 96.9 F | OXYGEN SATURATION: 97 %

## 2022-02-14 PROCEDURE — 99214 OFFICE O/P EST MOD 30 MIN: CPT

## 2022-02-15 ENCOUNTER — APPOINTMENT (OUTPATIENT)
Dept: RADIOLOGY | Facility: HOSPITAL | Age: 87
End: 2022-02-15
Payer: MEDICARE

## 2022-02-15 ENCOUNTER — OUTPATIENT (OUTPATIENT)
Dept: OUTPATIENT SERVICES | Facility: HOSPITAL | Age: 87
LOS: 1 days | End: 2022-02-15
Payer: MEDICARE

## 2022-02-15 DIAGNOSIS — Z98.89 OTHER SPECIFIED POSTPROCEDURAL STATES: Chronic | ICD-10-CM

## 2022-02-15 DIAGNOSIS — M54.50 LOW BACK PAIN, UNSPECIFIED: ICD-10-CM

## 2022-02-15 PROCEDURE — 72110 X-RAY EXAM L-2 SPINE 4/>VWS: CPT

## 2022-02-15 PROCEDURE — 72110 X-RAY EXAM L-2 SPINE 4/>VWS: CPT | Mod: 26

## 2022-02-18 ENCOUNTER — NON-APPOINTMENT (OUTPATIENT)
Age: 87
End: 2022-02-18

## 2022-02-18 NOTE — ASSESSMENT
[FreeTextEntry1] : 86 yo M with PMH iliac artery aneurysm, CAD, CHF with defibrillator and on AC, dilated congestive cardiomyopathy, HTN, hypothyroidism, and HLD presents with back pain.\par \par patient has a history of lumbosacral pain however reports new sharp pain that is worse in the morning and doesn’t allow him to get up in the morning due to severe pain \par he is completing physical therapy and doesn’t notice improvement \par he is taking tylenol 2 tablets in the morning and one tylenol PM at night \par \par Patient will continue physical therapy and increase tylenol dose frequency to help with pain control \par since pain has increased in the last 3 weeks , will reassess with a lumbar back xray for potential fractures or changes.\par physical exam limited due to pain, denies radiculopathy symptoms or change in bladder or bowel function

## 2022-02-18 NOTE — REVIEW OF SYSTEMS
[Joint Pain] : joint pain [Muscle Pain] : muscle pain [Back Pain] : back pain [Unsteady Walk] : ataxia [Fever] : no fever [Chills] : no chills [Night Sweats] : no night sweats [Chest Pain] : no chest pain [Orthopena] : no orthopnea [Shortness Of Breath] : no shortness of breath [Abdominal Pain] : no abdominal pain [Dysuria] : no dysuria [Joint Stiffness] : no joint stiffness [Muscle Weakness] : no muscle weakness [Joint Swelling] : no joint swelling [Skin Rash] : no skin rash [Headache] : no headache [Dizziness] : no dizziness

## 2022-02-18 NOTE — HISTORY OF PRESENT ILLNESS
[FreeTextEntry8] : 88 yo M with PMH iliac artery aneurysm, CAD, CHF with defibrillator and on AC, dilated congestive cardiomyopathy, HTN, hypothyroidism, and HLD presents with back pain.\par \par patient has a history of lumbosacral pain however reports new sharp pain that is worse in the morning and doesn’t allow him to get up in the morning due to severe pain \par he is completing physical therapy and doesn’t notice improvement \par he is taking tylenol 2 tablets in the morning and one tylenol PM at night

## 2022-02-18 NOTE — PHYSICAL EXAM
[No Acute Distress] : no acute distress [Well Nourished] : well nourished [Well Developed] : well developed [Normal Sclera/Conjunctiva] : normal sclera/conjunctiva [No Respiratory Distress] : no respiratory distress  [No Accessory Muscle Use] : no accessory muscle use [Normal Rate] : normal rate  [No Edema] : there was no peripheral edema [No Joint Swelling] : no joint swelling [Grossly Normal Strength/Tone] : grossly normal strength/tone [No Rash] : no rash [de-identified] : pain with palpation near right lumbar paraspinal muscles, no radiculopathy, exam limited range of motion due to pain  [de-identified] : using walke to ambulate

## 2022-02-23 ENCOUNTER — OUTPATIENT (OUTPATIENT)
Dept: OUTPATIENT SERVICES | Facility: HOSPITAL | Age: 87
LOS: 1 days | End: 2022-02-23
Payer: MEDICARE

## 2022-02-23 ENCOUNTER — APPOINTMENT (OUTPATIENT)
Dept: ULTRASOUND IMAGING | Facility: HOSPITAL | Age: 87
End: 2022-02-23
Payer: MEDICARE

## 2022-02-23 DIAGNOSIS — R10.31 RIGHT LOWER QUADRANT PAIN: ICD-10-CM

## 2022-02-23 DIAGNOSIS — Z98.89 OTHER SPECIFIED POSTPROCEDURAL STATES: Chronic | ICD-10-CM

## 2022-02-23 PROCEDURE — 76856 US EXAM PELVIC COMPLETE: CPT | Mod: 26

## 2022-02-23 PROCEDURE — 76856 US EXAM PELVIC COMPLETE: CPT

## 2022-02-24 ENCOUNTER — NON-APPOINTMENT (OUTPATIENT)
Age: 87
End: 2022-02-24

## 2022-02-24 DIAGNOSIS — R10.31 RIGHT LOWER QUADRANT PAIN: ICD-10-CM

## 2022-02-25 ENCOUNTER — APPOINTMENT (OUTPATIENT)
Dept: CT IMAGING | Facility: HOSPITAL | Age: 87
End: 2022-02-25
Payer: MEDICARE

## 2022-02-25 ENCOUNTER — OUTPATIENT (OUTPATIENT)
Dept: OUTPATIENT SERVICES | Facility: HOSPITAL | Age: 87
LOS: 1 days | End: 2022-02-25
Payer: MEDICARE

## 2022-02-25 DIAGNOSIS — Z98.89 OTHER SPECIFIED POSTPROCEDURAL STATES: Chronic | ICD-10-CM

## 2022-02-25 DIAGNOSIS — Z00.8 ENCOUNTER FOR OTHER GENERAL EXAMINATION: ICD-10-CM

## 2022-02-25 PROCEDURE — 72131 CT LUMBAR SPINE W/O DYE: CPT | Mod: 26,MH

## 2022-02-25 PROCEDURE — 72131 CT LUMBAR SPINE W/O DYE: CPT | Mod: MH

## 2022-03-02 ENCOUNTER — LABORATORY RESULT (OUTPATIENT)
Age: 87
End: 2022-03-02

## 2022-03-02 ENCOUNTER — NON-APPOINTMENT (OUTPATIENT)
Age: 87
End: 2022-03-02

## 2022-03-09 ENCOUNTER — APPOINTMENT (OUTPATIENT)
Dept: CARDIOLOGY | Facility: CLINIC | Age: 87
End: 2022-03-09
Payer: MEDICARE

## 2022-03-09 PROCEDURE — 93289 INTERROG DEVICE EVAL HEART: CPT

## 2022-03-09 NOTE — PROCEDURE
[CRT-D] : Cardiac resynchronization therapy defibrillator [VVIR] : VVIR [Longevity: ___ months] : The estimated remaining battery life is [unfilled] months [Threshold Testing Performed] : Threshold testing was performed [Lead Imp:  ___ohms] : lead impedance was [unfilled] ohms [___V @] : [unfilled] V [___ ms] : [unfilled] ms [None] : none [Pace ___ %] : Pace [unfilled]% [de-identified] : bi v paced [de-identified] : st judes [de-identified] : 9234-07x [de-identified] : 12/11/2018 [de-identified] : [id number] [de-identified] : gretchen lead present working well no evidence of insulation problem\par \par no therapies\par normal function\par \par

## 2022-03-11 ENCOUNTER — NON-APPOINTMENT (OUTPATIENT)
Age: 87
End: 2022-03-11

## 2022-03-11 LAB
INR PPP: 1.69 RATIO
PT BLD: 20.1 SEC

## 2022-03-14 ENCOUNTER — OUTPATIENT (OUTPATIENT)
Dept: OUTPATIENT SERVICES | Facility: HOSPITAL | Age: 87
LOS: 1 days | Discharge: HOME | End: 2022-03-14

## 2022-03-14 ENCOUNTER — APPOINTMENT (OUTPATIENT)
Dept: PULMONOLOGY | Facility: CLINIC | Age: 87
End: 2022-03-14

## 2022-03-14 DIAGNOSIS — Z98.89 OTHER SPECIFIED POSTPROCEDURAL STATES: Chronic | ICD-10-CM

## 2022-03-14 DIAGNOSIS — D64.9 ANEMIA, UNSPECIFIED: ICD-10-CM

## 2022-03-15 ENCOUNTER — APPOINTMENT (OUTPATIENT)
Dept: HEMATOLOGY ONCOLOGY | Facility: CLINIC | Age: 87
End: 2022-03-15
Payer: MEDICARE

## 2022-03-15 PROCEDURE — 99441: CPT | Mod: 95

## 2022-03-15 NOTE — ASSESSMENT
[FreeTextEntry1] : Lab work reviewed.\par Patient with history of pancytopenia with elevated MCV and increased monocyte percentage–most recent  ANC available WNL, hemoglobin stable.  Platelet count appears to be fluctuating/acceptable at present.\par It remains possible the patient has an underlying myelodysplastic process/CMML.  No overt bleeding noted clinically currently. \par \par Should hematologic scenario worsen/change, then further evaluation with follow-up BMB would be warranted should he wish to pursue this.\par \par Patient to continue p.o. vitamin B12 supplement for h/o vitamin B-12 level less than 400, and p.o. folic acid (h/o slightly elevated homocysteine level).\par \par Patient and his wife were given the opportunity to ask questions.  Their questions have been answered to their apparent satisfaction at this time.

## 2022-03-15 NOTE — HISTORY OF PRESENT ILLNESS
[de-identified] : 2012–Pancytopenia with macrocytosis.\par \par 10/2016–Bone marrow biopsy and bone marrow aspirate showed a hypercellular bone marrow with trilineage hematopoiesis and maturation, increased iron stores.  Flow cytometry myeloid immunophenotypic findings showed no increase in myeloid immaturity.  The lymphocyte immunophenotypic findings showed T-cell predominance and rare B cells.  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 Alcazar who monitored his CBC with differential.\par \par 7/2020–Peripheral blood flow cytometry lymphocyte immunophenotypic findings showed no diagnostic abnormalities with lymphopenia.  Myeloid immunophenotypic findings showed normal myeloid granularity with no increase in myeloid immaturity.  No significant absolute lymphocytosis, monocytosis or neutropenia.  MDS FISH panel normal. [de-identified] : Getting PT for back arthritis.\par No bleeding. No fevers, cough. No abdominal pain. No current c/o CP.\par \par Got COVID vaccines (Moderna), along with booster.

## 2022-03-15 NOTE — REASON FOR VISIT
[Home] : at home, [unfilled] , at the time of the visit. [Medical Office: ([facility])___] : at the medical office located in  [Verbal consent obtained from patient] : the patient, [unfilled] [Follow-Up Visit] : a follow-up visit for [FreeTextEntry2] : pancytopenia

## 2022-03-16 ENCOUNTER — LABORATORY RESULT (OUTPATIENT)
Age: 87
End: 2022-03-16

## 2022-03-29 ENCOUNTER — NON-APPOINTMENT (OUTPATIENT)
Age: 87
End: 2022-03-29

## 2022-03-29 ENCOUNTER — APPOINTMENT (OUTPATIENT)
Dept: ELECTROPHYSIOLOGY | Facility: CLINIC | Age: 87
End: 2022-03-29
Payer: MEDICARE

## 2022-03-29 PROCEDURE — 93296 REM INTERROG EVL PM/IDS: CPT

## 2022-03-29 PROCEDURE — 93295 DEV INTERROG REMOTE 1/2/MLT: CPT

## 2022-03-30 ENCOUNTER — LABORATORY RESULT (OUTPATIENT)
Age: 87
End: 2022-03-30

## 2022-04-01 ENCOUNTER — APPOINTMENT (OUTPATIENT)
Dept: FAMILY MEDICINE | Facility: CLINIC | Age: 87
End: 2022-04-01
Payer: MEDICARE

## 2022-04-01 VITALS
WEIGHT: 268 LBS | DIASTOLIC BLOOD PRESSURE: 66 MMHG | BODY MASS INDEX: 38.45 KG/M2 | HEART RATE: 59 BPM | RESPIRATION RATE: 16 BRPM | TEMPERATURE: 96.9 F | SYSTOLIC BLOOD PRESSURE: 106 MMHG | OXYGEN SATURATION: 96 %

## 2022-04-01 DIAGNOSIS — S93.402A SPRAIN OF UNSPECIFIED LIGAMENT OF LEFT ANKLE, INITIAL ENCOUNTER: ICD-10-CM

## 2022-04-01 DIAGNOSIS — M54.2 CERVICALGIA: ICD-10-CM

## 2022-04-01 DIAGNOSIS — Z92.29 PERSONAL HISTORY OF OTHER DRUG THERAPY: ICD-10-CM

## 2022-04-01 DIAGNOSIS — I50.9 HEART FAILURE, UNSPECIFIED: ICD-10-CM

## 2022-04-01 DIAGNOSIS — I72.3 ANEURYSM OF ILIAC ARTERY: Chronic | ICD-10-CM

## 2022-04-01 DIAGNOSIS — M54.50 LOW BACK PAIN, UNSPECIFIED: ICD-10-CM

## 2022-04-01 DIAGNOSIS — I95.9 HYPOTENSION, UNSPECIFIED: ICD-10-CM

## 2022-04-01 PROCEDURE — 99213 OFFICE O/P EST LOW 20 MIN: CPT

## 2022-04-01 RX ORDER — CLOBETASOL PROPIONATE 0.5 MG/G
0.05 OINTMENT TOPICAL TWICE DAILY
Qty: 1 | Refills: 5 | Status: ACTIVE | COMMUNITY
Start: 2022-04-01 | End: 1900-01-01

## 2022-04-02 ENCOUNTER — NON-APPOINTMENT (OUTPATIENT)
Age: 87
End: 2022-04-02

## 2022-04-04 NOTE — HISTORY OF PRESENT ILLNESS
[FreeTextEntry8] : 88 y/o here c/oi worsening LS spine pain and ore when is in bed notes cannot get out of bed and has to roll to the side and needs assistance to get out bed. Pt notes the mattress is soft and also notes can only lay on the side since pain gets really bad at times when sleeps on side the legs bother him as well.  pt taking OTC Tylenol as directed but cut down on amount.  pt denies bowel or bladder issues no radiation of pain to legs notes is radiates to sides on his back Pain 6-8/10

## 2022-04-04 NOTE — PHYSICAL EXAM
[No Acute Distress] : no acute distress [Well-Appearing] : well-appearing [Normal] : no respiratory distress, lungs were clear to auscultation bilaterally and no accessory muscle use [Normal S1, S2] : normal S1 and S2 [Soft] : abdomen soft [Non Tender] : non-tender [No Rash] : no rash [de-identified] : IRR [de-identified] : LS spine pain paraspinal muslce present bilateral L5-4 level  no skin changes or rashes negative leg raise

## 2022-04-04 NOTE — REVIEW OF SYSTEMS
[Lower Ext Edema] : lower extremity edema [Dyspnea on Exertion] : dyspnea on exertion [Joint Pain] : joint pain [Joint Stiffness] : joint stiffness [Muscle Pain] : muscle pain [Back Pain] : back pain [Unsteady Walk] : ataxia [Easy Bleeding] : easy bleeding [Negative] : Psychiatric

## 2022-04-04 NOTE — ASSESSMENT
[FreeTextEntry1] : stretching d/w pt and better support mattress swimming\par weight loss encouraged\par d/w pt f/u PT and speak to physiatrist he is going to now to work on LS spine as well

## 2022-04-13 ENCOUNTER — LABORATORY RESULT (OUTPATIENT)
Age: 87
End: 2022-04-13

## 2022-04-15 ENCOUNTER — NON-APPOINTMENT (OUTPATIENT)
Age: 87
End: 2022-04-15

## 2022-04-23 ENCOUNTER — RX RENEWAL (OUTPATIENT)
Age: 87
End: 2022-04-23

## 2022-04-24 ENCOUNTER — INPATIENT (INPATIENT)
Facility: HOSPITAL | Age: 87
LOS: 2 days | Discharge: HOME | DRG: 872 | End: 2022-04-27
Attending: INTERNAL MEDICINE | Admitting: INTERNAL MEDICINE
Payer: MEDICARE

## 2022-04-24 VITALS
HEART RATE: 60 BPM | OXYGEN SATURATION: 100 % | WEIGHT: 274.92 LBS | HEIGHT: 74 IN | TEMPERATURE: 98 F | SYSTOLIC BLOOD PRESSURE: 181 MMHG | RESPIRATION RATE: 18 BRPM | DIASTOLIC BLOOD PRESSURE: 81 MMHG

## 2022-04-24 DIAGNOSIS — Z98.89 OTHER SPECIFIED POSTPROCEDURAL STATES: Chronic | ICD-10-CM

## 2022-04-24 DIAGNOSIS — A41.9 SEPSIS, UNSPECIFIED ORGANISM: ICD-10-CM

## 2022-04-24 LAB
ALBUMIN SERPL ELPH-MCNC: 4 G/DL — SIGNIFICANT CHANGE UP (ref 3.3–5)
ALP SERPL-CCNC: 72 U/L — SIGNIFICANT CHANGE UP (ref 40–120)
ALT FLD-CCNC: 26 U/L — SIGNIFICANT CHANGE UP (ref 10–45)
ANION GAP SERPL CALC-SCNC: 10 MMOL/L — SIGNIFICANT CHANGE UP (ref 5–17)
APPEARANCE UR: CLEAR — SIGNIFICANT CHANGE UP
APTT BLD: 35.6 SEC — HIGH (ref 27.5–35.5)
AST SERPL-CCNC: 19 U/L — SIGNIFICANT CHANGE UP (ref 10–40)
BASOPHILS # BLD AUTO: 0.02 K/UL — SIGNIFICANT CHANGE UP (ref 0–0.2)
BASOPHILS NFR BLD AUTO: 0.2 % — SIGNIFICANT CHANGE UP (ref 0–2)
BILIRUB SERPL-MCNC: 0.7 MG/DL — SIGNIFICANT CHANGE UP (ref 0.2–1.2)
BILIRUB UR-MCNC: NEGATIVE — SIGNIFICANT CHANGE UP
BUN SERPL-MCNC: 32 MG/DL — HIGH (ref 7–23)
CALCIUM SERPL-MCNC: 8.8 MG/DL — SIGNIFICANT CHANGE UP (ref 8.4–10.5)
CHLORIDE SERPL-SCNC: 106 MMOL/L — SIGNIFICANT CHANGE UP (ref 96–108)
CO2 SERPL-SCNC: 27 MMOL/L — SIGNIFICANT CHANGE UP (ref 22–31)
COLOR SPEC: YELLOW — SIGNIFICANT CHANGE UP
CREAT SERPL-MCNC: 1.62 MG/DL — HIGH (ref 0.5–1.3)
DIFF PNL FLD: ABNORMAL
EGFR: 41 ML/MIN/1.73M2 — LOW
EOSINOPHIL # BLD AUTO: 0.01 K/UL — SIGNIFICANT CHANGE UP (ref 0–0.5)
EOSINOPHIL NFR BLD AUTO: 0.1 % — SIGNIFICANT CHANGE UP (ref 0–6)
GLUCOSE SERPL-MCNC: 135 MG/DL — HIGH (ref 70–99)
GLUCOSE UR QL: NEGATIVE — SIGNIFICANT CHANGE UP
HCT VFR BLD CALC: 38 % — LOW (ref 39–50)
HGB BLD-MCNC: 12.1 G/DL — LOW (ref 13–17)
IMM GRANULOCYTES NFR BLD AUTO: 2.5 % — HIGH (ref 0–1.5)
INR BLD: 2.81 RATIO — HIGH (ref 0.88–1.16)
KETONES UR-MCNC: NEGATIVE — SIGNIFICANT CHANGE UP
LACTATE SERPL-SCNC: 2.2 MMOL/L — HIGH (ref 0.7–2)
LEUKOCYTE ESTERASE UR-ACNC: NEGATIVE — SIGNIFICANT CHANGE UP
LYMPHOCYTES # BLD AUTO: 0.38 K/UL — LOW (ref 1–3.3)
LYMPHOCYTES # BLD AUTO: 4.1 % — LOW (ref 13–44)
MCHC RBC-ENTMCNC: 31.8 GM/DL — LOW (ref 32–36)
MCHC RBC-ENTMCNC: 33 PG — SIGNIFICANT CHANGE UP (ref 27–34)
MCV RBC AUTO: 103.5 FL — HIGH (ref 80–100)
MONOCYTES # BLD AUTO: 2.23 K/UL — HIGH (ref 0–0.9)
MONOCYTES NFR BLD AUTO: 24.1 % — HIGH (ref 2–14)
NEUTROPHILS # BLD AUTO: 6.39 K/UL — SIGNIFICANT CHANGE UP (ref 1.8–7.4)
NEUTROPHILS NFR BLD AUTO: 69 % — SIGNIFICANT CHANGE UP (ref 43–77)
NITRITE UR-MCNC: NEGATIVE — SIGNIFICANT CHANGE UP
NRBC # BLD: 0 /100 WBCS — SIGNIFICANT CHANGE UP (ref 0–0)
NT-PROBNP SERPL-SCNC: 2411 PG/ML — HIGH (ref 0–300)
PH UR: 5 — SIGNIFICANT CHANGE UP (ref 5–8)
PLATELET # BLD AUTO: 116 K/UL — LOW (ref 150–400)
POTASSIUM SERPL-MCNC: 4.6 MMOL/L — SIGNIFICANT CHANGE UP (ref 3.5–5.3)
POTASSIUM SERPL-SCNC: 4.6 MMOL/L — SIGNIFICANT CHANGE UP (ref 3.5–5.3)
PROCALCITONIN SERPL-MCNC: 0.04 NG/ML — SIGNIFICANT CHANGE UP
PROT SERPL-MCNC: 7.6 G/DL — SIGNIFICANT CHANGE UP (ref 6–8.3)
PROT UR-MCNC: 30 MG/DL
PROTHROM AB SERPL-ACNC: 32.9 SEC — HIGH (ref 10.5–13.4)
RAPID RVP RESULT: SIGNIFICANT CHANGE UP
RBC # BLD: 3.67 M/UL — LOW (ref 4.2–5.8)
RBC # FLD: 14.2 % — SIGNIFICANT CHANGE UP (ref 10.3–14.5)
SARS-COV-2 RNA SPEC QL NAA+PROBE: SIGNIFICANT CHANGE UP
SODIUM SERPL-SCNC: 143 MMOL/L — SIGNIFICANT CHANGE UP (ref 135–145)
SP GR SPEC: 1.02 — SIGNIFICANT CHANGE UP (ref 1.01–1.02)
TROPONIN I, HIGH SENSITIVITY RESULT: 37.1 NG/L — SIGNIFICANT CHANGE UP
TROPONIN I, HIGH SENSITIVITY RESULT: 70.1 NG/L — SIGNIFICANT CHANGE UP
UROBILINOGEN FLD QL: NEGATIVE — SIGNIFICANT CHANGE UP
WBC # BLD: 9.26 K/UL — SIGNIFICANT CHANGE UP (ref 3.8–10.5)
WBC # FLD AUTO: 9.26 K/UL — SIGNIFICANT CHANGE UP (ref 3.8–10.5)

## 2022-04-24 PROCEDURE — 93010 ELECTROCARDIOGRAM REPORT: CPT

## 2022-04-24 PROCEDURE — 99285 EMERGENCY DEPT VISIT HI MDM: CPT | Mod: CS

## 2022-04-24 PROCEDURE — 71045 X-RAY EXAM CHEST 1 VIEW: CPT | Mod: 26

## 2022-04-24 PROCEDURE — 74176 CT ABD & PELVIS W/O CONTRAST: CPT | Mod: 26,MA

## 2022-04-24 PROCEDURE — 99223 1ST HOSP IP/OBS HIGH 75: CPT

## 2022-04-24 RX ORDER — TIOTROPIUM BROMIDE 18 UG/1
1 CAPSULE ORAL; RESPIRATORY (INHALATION) DAILY
Refills: 0 | Status: DISCONTINUED | OUTPATIENT
Start: 2022-04-24 | End: 2022-04-27

## 2022-04-24 RX ORDER — CARVEDILOL PHOSPHATE 80 MG/1
25 CAPSULE, EXTENDED RELEASE ORAL EVERY 12 HOURS
Refills: 0 | Status: DISCONTINUED | OUTPATIENT
Start: 2022-04-24 | End: 2022-04-27

## 2022-04-24 RX ORDER — PANTOPRAZOLE SODIUM 20 MG/1
40 TABLET, DELAYED RELEASE ORAL
Refills: 0 | Status: DISCONTINUED | OUTPATIENT
Start: 2022-04-24 | End: 2022-04-27

## 2022-04-24 RX ORDER — ACETAMINOPHEN 500 MG
650 TABLET ORAL ONCE
Refills: 0 | Status: COMPLETED | OUTPATIENT
Start: 2022-04-24 | End: 2022-04-24

## 2022-04-24 RX ORDER — WARFARIN SODIUM 2.5 MG/1
2.5 TABLET ORAL ONCE
Refills: 0 | Status: COMPLETED | OUTPATIENT
Start: 2022-04-24 | End: 2022-04-24

## 2022-04-24 RX ORDER — LEVOTHYROXINE SODIUM 125 MCG
100 TABLET ORAL DAILY
Refills: 0 | Status: DISCONTINUED | OUTPATIENT
Start: 2022-04-24 | End: 2022-04-27

## 2022-04-24 RX ORDER — CHOLECALCIFEROL (VITAMIN D3) 125 MCG
1000 CAPSULE ORAL DAILY
Refills: 0 | Status: DISCONTINUED | OUTPATIENT
Start: 2022-04-24 | End: 2022-04-27

## 2022-04-24 RX ORDER — SODIUM CHLORIDE 9 MG/ML
500 INJECTION INTRAMUSCULAR; INTRAVENOUS; SUBCUTANEOUS ONCE
Refills: 0 | Status: COMPLETED | OUTPATIENT
Start: 2022-04-24 | End: 2022-04-24

## 2022-04-24 RX ORDER — PIPERACILLIN AND TAZOBACTAM 4; .5 G/20ML; G/20ML
3.38 INJECTION, POWDER, LYOPHILIZED, FOR SOLUTION INTRAVENOUS ONCE
Refills: 0 | Status: COMPLETED | OUTPATIENT
Start: 2022-04-24 | End: 2022-04-24

## 2022-04-24 RX ORDER — BUDESONIDE AND FORMOTEROL FUMARATE DIHYDRATE 160; 4.5 UG/1; UG/1
2 AEROSOL RESPIRATORY (INHALATION)
Refills: 0 | Status: DISCONTINUED | OUTPATIENT
Start: 2022-04-24 | End: 2022-04-27

## 2022-04-24 RX ADMIN — Medication 650 MILLIGRAM(S): at 20:53

## 2022-04-24 RX ADMIN — PIPERACILLIN AND TAZOBACTAM 200 GRAM(S): 4; .5 INJECTION, POWDER, LYOPHILIZED, FOR SOLUTION INTRAVENOUS at 22:14

## 2022-04-24 RX ADMIN — SODIUM CHLORIDE 500 MILLILITER(S): 9 INJECTION INTRAMUSCULAR; INTRAVENOUS; SUBCUTANEOUS at 22:15

## 2022-04-24 NOTE — ED ADULT NURSE NOTE - NSICDXPASTSURGICALHX_GEN_ALL_CORE_FT
PAST SURGICAL HISTORY:  History of hip surgery right    Status post insertion of iliac artery stent

## 2022-04-24 NOTE — ED ADULT NURSE NOTE - NSICDXPASTMEDICALHX_GEN_ALL_CORE_FT
PAST MEDICAL HISTORY:  AICD (automatic cardioverter/defibrillator) present     Arthritis     Atrial fibrillation     Bilateral edema of lower extremity     Chronic systolic congestive heart failure     Coronary artery disease due to calcified coronary lesion     Essential hypertension     Gastroesophageal reflux disease without esophagitis     HLD (hyperlipidemia)     Iliac artery aneurysm     CJ (obstructive sleep apnea)     Other specified hypothyroidism     Skin cancer     Venous insufficiency

## 2022-04-24 NOTE — ED PROVIDER NOTE - CARDIAC, MLM
Normal rate, regular rhythm.  Heart sounds S1, S2.  No murmurs, rubs or gallops. Normal rate, regular rhythm.  Heart sounds S1, S2.  No murmurs, rubs or gallops. moderate bilat lower leg/feet pitting edema

## 2022-04-24 NOTE — H&P ADULT - NSHPREVIEWOFSYSTEMS_GEN_ALL_CORE
CONSTITUTIONAL: No fever, weight loss, or fatigue  EYES: No eye pain, visual disturbances, or discharge  ENMT:  No difficulty hearing, tinnitus, vertigo; No sinus or throat pain  NECK: No pain or stiffness  RESPIRATORY: No cough, wheezing, chills or hemoptysis; +chronic shortness of breath  CARDIOVASCULAR: No chest pain, palpitations, dizziness, +chronic bl leg swelling  GASTROINTESTINAL: No abdominal or epigastric pain. + nausea, no vomiting, or hematemesis;++ diarrhea . No melena or hematochezia.  GENITOURINARY: No dysuria, frequency, hematuria, or incontinence  NEUROLOGICAL: No headaches, memory loss, loss of strength, numbness, or tremors  SKIN: No itching, burning, rashes, or lesions   LYMPH NODES: No enlarged glands  ENDOCRINE: No heat or cold intolerance; No hair loss  MUSCULOSKELETAL: No joint pain or swelling; No muscle, back, or extremity pain  PSYCHIATRIC: No depression, anxiety, mood swings, or difficulty sleeping  HEME/LYMPH: No easy bruising, or bleeding gums  ALLERY AND IMMUNOLOGIC: No hives or eczema

## 2022-04-24 NOTE — ED PROVIDER NOTE - CLINICAL SUMMARY MEDICAL DECISION MAKING FREE TEXT BOX
86yo M p/w diarrhea, nausea, SOB, chills today. with fever 102.7 here. rales at bases. r/o sepsis, pna, uti, viral syndrome, chf, acs. check labs, cxr, ua. will hold sepsis iv fluids due to possible acute chf.     lactate elevated. abx given for possible bacterial infx/sepsis. no obvious pna or chf on cxr. ua neg. procal neg. possible viral gastroenteritis. admit for iv fluids, further eval

## 2022-04-24 NOTE — ED PROVIDER NOTE - OBJECTIVE STATEMENT
pt with  PMH : chf, htn, HLD, CAD, afib, PPM,   bibems from home for diarrhea, moderate, x 2 this afternoon, assoc c nausea, feeling cold and short of breath. no chest pain. no fever. PMH : chf, htn, HLD, CAD, afib, PPM. no sick contacts. no cough, rhinorrhea. no dysuria. no abd pain pt with  PMH : chf, htn, HLD, CAD, afib, PPM,   bibems from home for diarrhea, moderate, x 2 this afternoon, assoc c nausea, feeling cold and short of breath. no chest pain. no fever.  no sick contacts. no cough, rhinorrhea. no dysuria. no abd pain

## 2022-04-24 NOTE — H&P ADULT - HISTORY OF PRESENT ILLNESS
87M hx of HTN, HLD, hx of mesenteric ischemia with SMA stent x 2, ?iliac artery aneurysm, hx of CHFrEF, AICD, hypothyroidism, venous insufficiency, CJ not on CPAP pw sepsis. Pt related he was at his daughter's house for a get together earlier today but felt some chills and after he went home had 2 episodes of large volume watery diarrhea associated with nausea with NO abdominal pain. +SOB but states he has chronic SOB as well. +chills, Denied fevers, cough, CP, dysuria or flank pain. He related chronic back pain for the past 3 months and had CT L/S about 2 months ago with severe arthritis and was advised epidurals as therapeutic option but had not received one yet. He related back pain has significantly worsened over the past 2 days and mainly in lower midback with no radicular sxs. No flank pain. In ED, febrile to 102.7 P:60 BP: 181/81 sat 98% on 2L. WBC: 9.26 69%N INR:2.81 Lact: elevated at 2.2 BNP: 2411 trop 37.1 procal: 0.04 BUN/cr: 32/1.62 (last cr: 1.46 1/27/21) UA neg. CTAP negative. COVID/RVP negative.     Denied hx of CAD/MI/cardiac stent and confirmed with wife and daughter at bedside.

## 2022-04-24 NOTE — H&P ADULT - ASSESSMENT
87M hx of HTN, HLD, hx of mesenteric ischemia with SMA stent x 2, ?iliac artery aneurysm, hx of CHFrEF, AICD, hypothyroidism, venous insufficiency, CJ not on CPAP pw nausea, diarrhea and sepsis    # Diarrhea with sepsis (fever, lactate, RUFINO)  s/p Zosyn in ED.   no overt source CXR, UA, CTAP negative for colitis, RVP/COVID negative.   significant increased back pain from baseline. CTAP no overt findings in lumbar region. Check dedicated CT L/S to be complete.   ID consult.   check stool for cultures, GI PCR Panel.     #RUFINO  hold Lasix and ramipril for 1-2 days until cr back to baseline    #HTN  cont coreg. hold ACE    #CHFrEF  cont coreg. hold lasix and ramipril for now.   weights, I/Os  Addendum: 2nd trop 70.1 from 37.1 with elevated delta. probably from demand ischemia sec to sepsis. trend trop, serial EKG, ECHO, cardiology consult. on coumadin/BB.     #Hypothyroid  cont [facility] but pt's list stated 75mcg. Call [facility] in am to confirm dose.  check TSH     #DVT/GI proph  on coumadin with therapeutic INR, check daily inr and dose coumadin. cont PPI.   low suspicion for PE/DVT as pt on coumadin and pt related INR has been high recently and coumadin dose has been reduced and currently INR therapeutic.     Wife and daughter at bedside updated.       CAPRINI SCORE [CLOT]    AGE RELATED RISK FACTORS                                                       MOBILITY RELATED FACTORS  [ ] Age 41-60 years                                            (1 Point)                  [ ] Bed rest                                                        (1 Point)  [ ] Age: 61-74 years                                           (2 Points)                 [ ] Plaster cast                                                   (2 Points)  [3 ] Age= 75 years                                              (3 Points)                 [ ] Bed bound for more than 72 hours                 (2 Points)    DISEASE RELATED RISK FACTORS                                               GENDER SPECIFIC FACTORS  [1 ] Edema in the lower extremities                       (1 Point)                  [ ] Pregnancy                                                     (1 Point)  [ ] Varicose veins                                               (1 Point)                  [ ] Post-partum < 6 weeks                                   (1 Point)             [1 ] BMI > 25 Kg/m2                                            (1 Point)                  [ ] Hormonal therapy  or oral contraception          (1 Point)                 [ ] Sepsis (in the previous month)                        (1 Point)                  [ ] History of pregnancy complications                 (1 point)  [ ] Pneumonia or serious lung disease                                               [ ] Unexplained or recurrent                     (1 Point)           (in the previous month)                               (1 Point)  [ ] Abnormal pulmonary function test                     (1 Point)                 SURGERY RELATED RISK FACTORS  [ ] Acute myocardial infarction                              (1 Point)                 [ ]  Section                                             (1 Point)  [ ] Congestive heart failure (in the previous month)  (1 Point)               [ ] Minor surgery                                                  (1 Point)   [ ] Inflammatory bowel disease                             (1 Point)                 [ ] Arthroscopic surgery                                        (2 Points)  [ ] Central venous access                                      (2 Points)                [ ] General surgery lasting more than 45 minutes   (2 Points)       [ ] Stroke (in the previous month)                          (5 Points)               [ ] Elective arthroplasty                                         (5 Points)                                                                                                                                               HEMATOLOGY RELATED FACTORS                                                 TRAUMA RELATED RISK FACTORS  [ ] Prior episodes of VTE                                     (3 Points)                [ ] Fracture of the hip, pelvis, or leg                       (5 Points)  [ ] Positive family history for VTE                         (3 Points)                 [ ] Acute spinal cord injury (in the previous month)  (5 Points)  [ ] Prothrombin 86716 A                                     (3 Points)                 [ ] Paralysis  (less than 1 month)                             (5 Points)  [ ] Factor V Leiden                                             (3 Points)                  [ ] Multiple Trauma within 1 month                        (5 Points)  [ ] Lupus anticoagulants                                     (3 Points)                                                           [ ] Anticardiolipin antibodies                               (3 Points)                                                       [ ] High homocysteine in the blood                      (3 Points)                                             [ ] Other congenital or acquired thrombophilia      (3 Points)                                                [ ] Heparin induced thrombocytopenia                  (3 Points)                                          Total Score [       5   ]    Caprini Score 0 - 2:  Low Risk, No VTE Prophylaxis required for most patients, encourage ambulation  Caprini Score 3 - 6:  At Risk, pharmacologic VTE prophylaxis is indicated for most patients (in the absence of a contraindication)  Caprini Score Greater than or = 7:  High Risk, pharmacologic VTE prophylaxis is indicated for most patients (in the absence of a contraindication)

## 2022-04-24 NOTE — H&P ADULT - NSHPPHYSICALEXAM_GEN_ALL_CORE
Vital Signs Last 24 Hrs  T(C): 39.3 (24 Apr 2022 20:52), Max: 39.3 (24 Apr 2022 20:52)  T(F): 102.7 (24 Apr 2022 20:52), Max: 102.7 (24 Apr 2022 20:52)  HR: 60 (24 Apr 2022 22:30) (60 - 60)  BP: 135/58 (24 Apr 2022 22:30) (135/58 - 181/81)  BP(mean): --  RR: 18 (24 Apr 2022 22:30) (18 - 25)  SpO2: 98% (24 Apr 2022 22:30) (97% - 100%)  Daily Height in cm: 187.96 (24 Apr 2022 20:25)    Daily   CAPILLARY BLOOD GLUCOSE        I&O's Summary      GENERAL: NAD  HEAD:  Normocephalic  EYES: EOMI, PERRLA, conjunctiva and sclera clear  ENMT: No tonsillar erythema, exudates, or enlargement; DRY mucous membranes, No lesions  NECK: Supple, No JVD, no bruit, normal thyroid  NERVOUS SYSTEM:  Alert & Oriented X3, Good concentration; grossly  Motor Strength 5/5 B/L upper and lower extremities; DTRs 2+ intact and symmetric  CHEST/LUNG: Clear to auscultation bilaterally; No rales, rhonchi, wheezing, or rubs  HEART: Regular rate and rhythm; No murmurs, rubs, or gallops  ABDOMEN: Soft, minimal epigastric tenderness, Nondistended; Bowel sounds present. no CVA tenderness. + lumbosacral vertebral tenderness on palp.   EXTREMITIES:  2+ Peripheral Pulses, No clubbing, cyanosis, + edema to mid shin bl unchanged per pt/wife.   LYMPH: No lymphadenopathy noted  SKIN: No rashes or lesions

## 2022-04-24 NOTE — H&P ADULT - NSHPLABSRESULTS_GEN_ALL_CORE
12.1   9.26  )-----------( 116      ( 2022 21:10 )             38.0           143  |  106  |  32<H>  ----------------------------<  135<H>  4.6   |  27  |  1.62<H>    Ca    8.8      2022 21:10    TPro  7.6  /  Alb  4.0  /  TBili  0.7  /  DBili  x   /  AST  19  /  ALT  26  /  AlkPhos  72      Lactate, Blood: 2.2 mmol/L ( @ 21:10)       LIVER FUNCTIONS - ( 2022 21:10 )  Alb: 4.0 g/dL / Pro: 7.6 g/dL / ALK PHOS: 72 U/L / ALT: 26 U/L / AST: 19 U/L / GGT: x               PT/INR - ( 2022 21:10 )   PT: 32.9 sec;   INR: 2.81 ratio         PTT - ( 2022 21:10 )  PTT:35.6 sec        Serum Pro-Brain Natriuretic Peptide: 2411 pg/mL (22 @ 21:10)    Urinalysis Basic - ( 2022 21:25 )    Color: Yellow / Appearance: Clear / S.020 / pH: x  Gluc: x / Ketone: Negative  / Bili: Negative / Urobili: Negative   Blood: x / Protein: 30 mg/dL / Nitrite: Negative   Leuk Esterase: Negative / RBC: 0-4 /HPF / WBC 0-2 /HPF   Sq Epi: x / Non Sq Epi: Neg.-Few / Bacteria: Trace /HPF      EKG: personally rev.  at 60bpm,       CXR: wet read. personally rev. NAPD    rad< from: CT Abdomen and Pelvis No Cont (22 @ 21:53) >    IMPRESSION:    Although evaluation of bowel is limited without oral and intravenous   contrast, no definite CT evidence for colitis is suggested.    No bowel obstruction,significant ascites or free air.    Nonspecific mild stranding in the retroperitoneum and mesentery. Question   infectious/inflammatory in etiology.    < end of copied text >

## 2022-04-24 NOTE — ED ADULT NURSE NOTE - OBJECTIVE STATEMENT
Patient states he was at home when he started to feel SOB. Early in the day he was feeling cold, chills and having diarrhea twice during the day. O2 2L in place, pulse ox 99%, not on home oxygen. Rectal temp 102.7F. Noted to have B/L lower extremity edema. Redness noted to sacrum. Denies chest pain and LOC.

## 2022-04-25 LAB
ALBUMIN SERPL ELPH-MCNC: 3.2 G/DL — LOW (ref 3.3–5)
ALP SERPL-CCNC: 53 U/L — SIGNIFICANT CHANGE UP (ref 40–120)
ALT FLD-CCNC: 18 U/L — SIGNIFICANT CHANGE UP (ref 10–45)
ANION GAP SERPL CALC-SCNC: 12 MMOL/L — SIGNIFICANT CHANGE UP (ref 5–17)
AST SERPL-CCNC: 19 U/L — SIGNIFICANT CHANGE UP (ref 10–40)
BASOPHILS # BLD AUTO: 0.02 K/UL — SIGNIFICANT CHANGE UP (ref 0–0.2)
BASOPHILS NFR BLD AUTO: 0.3 % — SIGNIFICANT CHANGE UP (ref 0–2)
BILIRUB SERPL-MCNC: 0.6 MG/DL — SIGNIFICANT CHANGE UP (ref 0.2–1.2)
BUN SERPL-MCNC: 27 MG/DL — HIGH (ref 7–23)
CALCIUM SERPL-MCNC: 7.9 MG/DL — LOW (ref 8.4–10.5)
CHLORIDE SERPL-SCNC: 110 MMOL/L — HIGH (ref 96–108)
CO2 SERPL-SCNC: 23 MMOL/L — SIGNIFICANT CHANGE UP (ref 22–31)
CREAT SERPL-MCNC: 1.27 MG/DL — SIGNIFICANT CHANGE UP (ref 0.5–1.3)
EGFR: 54 ML/MIN/1.73M2 — LOW
EOSINOPHIL # BLD AUTO: 0.01 K/UL — SIGNIFICANT CHANGE UP (ref 0–0.5)
EOSINOPHIL NFR BLD AUTO: 0.1 % — SIGNIFICANT CHANGE UP (ref 0–6)
GLUCOSE SERPL-MCNC: 110 MG/DL — HIGH (ref 70–99)
HCT VFR BLD CALC: 31.8 % — LOW (ref 39–50)
HCT VFR BLD CALC: 32.8 % — LOW (ref 39–50)
HGB BLD-MCNC: 10.2 G/DL — LOW (ref 13–17)
HGB BLD-MCNC: 10.4 G/DL — LOW (ref 13–17)
IMM GRANULOCYTES NFR BLD AUTO: 1.3 % — SIGNIFICANT CHANGE UP (ref 0–1.5)
INR BLD: 2.74 RATIO — HIGH (ref 0.88–1.16)
LACTATE SERPL-SCNC: 1.7 MMOL/L — SIGNIFICANT CHANGE UP (ref 0.7–2)
LYMPHOCYTES # BLD AUTO: 0.27 K/UL — LOW (ref 1–3.3)
LYMPHOCYTES # BLD AUTO: 3.4 % — LOW (ref 13–44)
MAGNESIUM SERPL-MCNC: 1.5 MG/DL — LOW (ref 1.6–2.6)
MCHC RBC-ENTMCNC: 31.7 GM/DL — LOW (ref 32–36)
MCHC RBC-ENTMCNC: 32.1 GM/DL — SIGNIFICANT CHANGE UP (ref 32–36)
MCHC RBC-ENTMCNC: 32.8 PG — SIGNIFICANT CHANGE UP (ref 27–34)
MCHC RBC-ENTMCNC: 33.2 PG — SIGNIFICANT CHANGE UP (ref 27–34)
MCV RBC AUTO: 102.3 FL — HIGH (ref 80–100)
MCV RBC AUTO: 104.8 FL — HIGH (ref 80–100)
MONOCYTES # BLD AUTO: 1.55 K/UL — HIGH (ref 0–0.9)
MONOCYTES NFR BLD AUTO: 19.7 % — HIGH (ref 2–14)
NEUTROPHILS # BLD AUTO: 5.92 K/UL — SIGNIFICANT CHANGE UP (ref 1.8–7.4)
NEUTROPHILS NFR BLD AUTO: 75.2 % — SIGNIFICANT CHANGE UP (ref 43–77)
NRBC # BLD: 0 /100 WBCS — SIGNIFICANT CHANGE UP (ref 0–0)
NRBC # BLD: 0 /100 WBCS — SIGNIFICANT CHANGE UP (ref 0–0)
PLATELET # BLD AUTO: 77 K/UL — LOW (ref 150–400)
PLATELET # BLD AUTO: 78 K/UL — LOW (ref 150–400)
POTASSIUM SERPL-MCNC: 3.7 MMOL/L — SIGNIFICANT CHANGE UP (ref 3.5–5.3)
POTASSIUM SERPL-SCNC: 3.7 MMOL/L — SIGNIFICANT CHANGE UP (ref 3.5–5.3)
PROCALCITONIN SERPL-MCNC: 0.29 NG/ML — HIGH
PROT SERPL-MCNC: 6.4 G/DL — SIGNIFICANT CHANGE UP (ref 6–8.3)
PROTHROM AB SERPL-ACNC: 32.1 SEC — HIGH (ref 10.5–13.4)
RBC # BLD: 3.11 M/UL — LOW (ref 4.2–5.8)
RBC # BLD: 3.13 M/UL — LOW (ref 4.2–5.8)
RBC # FLD: 14.4 % — SIGNIFICANT CHANGE UP (ref 10.3–14.5)
RBC # FLD: 14.5 % — SIGNIFICANT CHANGE UP (ref 10.3–14.5)
SODIUM SERPL-SCNC: 145 MMOL/L — SIGNIFICANT CHANGE UP (ref 135–145)
TROPONIN I, HIGH SENSITIVITY RESULT: 67.2 NG/L — SIGNIFICANT CHANGE UP
TSH SERPL-MCNC: 2.21 UIU/ML — SIGNIFICANT CHANGE UP (ref 0.36–3.74)
WBC # BLD: 6.16 K/UL — SIGNIFICANT CHANGE UP (ref 3.8–10.5)
WBC # BLD: 7.87 K/UL — SIGNIFICANT CHANGE UP (ref 3.8–10.5)
WBC # FLD AUTO: 6.16 K/UL — SIGNIFICANT CHANGE UP (ref 3.8–10.5)
WBC # FLD AUTO: 7.87 K/UL — SIGNIFICANT CHANGE UP (ref 3.8–10.5)

## 2022-04-25 PROCEDURE — 99233 SBSQ HOSP IP/OBS HIGH 50: CPT

## 2022-04-25 PROCEDURE — 99223 1ST HOSP IP/OBS HIGH 75: CPT

## 2022-04-25 PROCEDURE — 99222 1ST HOSP IP/OBS MODERATE 55: CPT

## 2022-04-25 PROCEDURE — 93306 TTE W/DOPPLER COMPLETE: CPT | Mod: 26

## 2022-04-25 PROCEDURE — 72131 CT LUMBAR SPINE W/O DYE: CPT | Mod: 26

## 2022-04-25 RX ORDER — MAGNESIUM SULFATE 500 MG/ML
1 VIAL (ML) INJECTION ONCE
Refills: 0 | Status: COMPLETED | OUTPATIENT
Start: 2022-04-25 | End: 2022-04-25

## 2022-04-25 RX ORDER — FOLIC ACID 0.8 MG
1 TABLET ORAL DAILY
Refills: 0 | Status: DISCONTINUED | OUTPATIENT
Start: 2022-04-25 | End: 2022-04-27

## 2022-04-25 RX ORDER — METRONIDAZOLE 500 MG
500 TABLET ORAL EVERY 8 HOURS
Refills: 0 | Status: DISCONTINUED | OUTPATIENT
Start: 2022-04-25 | End: 2022-04-26

## 2022-04-25 RX ORDER — ACETAMINOPHEN 500 MG
650 TABLET ORAL EVERY 6 HOURS
Refills: 0 | Status: DISCONTINUED | OUTPATIENT
Start: 2022-04-25 | End: 2022-04-27

## 2022-04-25 RX ORDER — CEFTRIAXONE 500 MG/1
1000 INJECTION, POWDER, FOR SOLUTION INTRAMUSCULAR; INTRAVENOUS EVERY 24 HOURS
Refills: 0 | Status: DISCONTINUED | OUTPATIENT
Start: 2022-04-25 | End: 2022-04-26

## 2022-04-25 RX ORDER — BENZOCAINE AND MENTHOL 5; 1 G/100ML; G/100ML
1 LIQUID ORAL
Refills: 0 | Status: DISCONTINUED | OUTPATIENT
Start: 2022-04-25 | End: 2022-04-27

## 2022-04-25 RX ORDER — WARFARIN SODIUM 2.5 MG/1
2.5 TABLET ORAL ONCE
Refills: 0 | Status: DISCONTINUED | OUTPATIENT
Start: 2022-04-25 | End: 2022-04-25

## 2022-04-25 RX ORDER — WARFARIN SODIUM 2.5 MG/1
5 TABLET ORAL ONCE
Refills: 0 | Status: COMPLETED | OUTPATIENT
Start: 2022-04-25 | End: 2022-04-25

## 2022-04-25 RX ADMIN — PANTOPRAZOLE SODIUM 40 MILLIGRAM(S): 20 TABLET, DELAYED RELEASE ORAL at 05:16

## 2022-04-25 RX ADMIN — Medication 1000 UNIT(S): at 12:24

## 2022-04-25 RX ADMIN — TIOTROPIUM BROMIDE 1 CAPSULE(S): 18 CAPSULE ORAL; RESPIRATORY (INHALATION) at 11:02

## 2022-04-25 RX ADMIN — BUDESONIDE AND FORMOTEROL FUMARATE DIHYDRATE 2 PUFF(S): 160; 4.5 AEROSOL RESPIRATORY (INHALATION) at 11:02

## 2022-04-25 RX ADMIN — Medication 100 MILLIGRAM(S): at 05:16

## 2022-04-25 RX ADMIN — CARVEDILOL PHOSPHATE 25 MILLIGRAM(S): 80 CAPSULE, EXTENDED RELEASE ORAL at 05:16

## 2022-04-25 RX ADMIN — Medication 100 MILLIGRAM(S): at 16:32

## 2022-04-25 RX ADMIN — CEFTRIAXONE 100 MILLIGRAM(S): 500 INJECTION, POWDER, FOR SOLUTION INTRAMUSCULAR; INTRAVENOUS at 02:36

## 2022-04-25 RX ADMIN — Medication 100 GRAM(S): at 18:44

## 2022-04-25 RX ADMIN — CARVEDILOL PHOSPHATE 25 MILLIGRAM(S): 80 CAPSULE, EXTENDED RELEASE ORAL at 17:40

## 2022-04-25 RX ADMIN — WARFARIN SODIUM 2.5 MILLIGRAM(S): 2.5 TABLET ORAL at 00:52

## 2022-04-25 RX ADMIN — Medication 100 MICROGRAM(S): at 05:16

## 2022-04-25 RX ADMIN — Medication 100 MILLIGRAM(S): at 21:07

## 2022-04-25 RX ADMIN — BUDESONIDE AND FORMOTEROL FUMARATE DIHYDRATE 2 PUFF(S): 160; 4.5 AEROSOL RESPIRATORY (INHALATION) at 22:13

## 2022-04-25 NOTE — CONSULT NOTE ADULT - SUBJECTIVE AND OBJECTIVE BOX
MELISSA BROWER  [id number]      HPI:    Melissa Brower is an 87 year old man with past medical history of Coronary artery disease (history of myocardial infarction s/p PCI), history of mesenteric ischemia with stent to SMA, possible iliac artery disease, Hypertension, HFrEF (s/p ICD) and CJ who presented with chills and diarrhea.         ALLERGIES:  Lovenox (Other)      PAST MEDICAL & SURGICAL HISTORY:  Coronary artery disease (history of myocardial infarction s/p PCI) History of mesenteric ischemia with stent to SMA  Possible iliac artery disease  Hypertension  HFrEF (s/p ICD)  Obstructive sleep apnea  Hip surgery    CURRENT MEDICATIONS:  acetaminophen     Tablet .. 650 milliGRAM(s) Oral every 6 hours PRN  budesonide  80 MICROgram(s)/formoterol 4.5 MICROgram(s) Inhaler 2 Puff(s) Inhalation two times a day  carvedilol 25 milliGRAM(s) Oral every 12 hours  cefTRIAXone   IVPB 1000 milliGRAM(s) IV Intermittent every 24 hours  cholecalciferol 1000 Unit(s) Oral daily  levothyroxine 100 MICROGram(s) Oral daily  metroNIDAZOLE  IVPB 500 milliGRAM(s) IV Intermittent every 8 hours  pantoprazole    Tablet 40 milliGRAM(s) Oral before breakfast  tiotropium 18 MICROgram(s) Capsule 1 Capsule(s) Inhalation daily      FAMILY HISTORY:  FH: CAD (coronary artery disease) (Mother)        ROS:  All 10 systems reviewed and positives noted in HPI    OBJECTIVE:    VITAL SIGNS:  Vital Signs Last 24 Hrs  T(C): 36.9 (25 Apr 2022 08:00), Max: 39.3 (24 Apr 2022 20:52)  T(F): 98.5 (25 Apr 2022 08:00), Max: 102.7 (24 Apr 2022 20:52)  HR: 60 (25 Apr 2022 08:00) (60 - 61)  BP: 109/58 (25 Apr 2022 08:00) (109/58 - 181/81)  BP(mean): 75 (25 Apr 2022 01:00) (75 - 75)  RR: 16 (25 Apr 2022 08:00) (16 - 25)  SpO2: 95% (25 Apr 2022 08:00) (95% - 100%)    PHYSICAL EXAM:  General: well appearing, no distress  HEENT: sclera anicteric  Neck: supple, no carotid bruits b/l  CVS: JVP ~ 7 cm H20, RRR, s1, s2, no murmurs/rubs/gallops  Chest: unlabored respirations, clear to auscultation b/l  Abdomen: non-distended  Extremities: no lower extremity edema b/l  Neuro: awake, alert & oriented x 3  Psych: normal affect      LABS:                        10.4   7.87  )-----------( 77       ( 25 Apr 2022 06:25 )             32.8     04-25    145  |  110<H>  |  27<H>  ----------------------------<  110<H>  3.7   |  23  |  1.27    Ca    7.9<L>      25 Apr 2022 06:25  Mg     1.5     04-25    TPro  6.4  /  Alb  3.2<L>  /  TBili  0.6  /  DBili  x   /  AST  19  /  ALT  18  /  AlkPhos  53  04-25        PT/INR - ( 25 Apr 2022 06:25 )   PT: 32.1 sec;   INR: 2.74 ratio         PTT - ( 24 Apr 2022 21:10 )  PTT:35.6 sec      ECG:      TTE:     MELISSA BROWER  [id number]      HPI:    Melissa Brower is an 87 year old man with past medical history of Coronary artery disease (history of myocardial infarction s/p PCI), history of mesenteric ischemia with stent to SMA, possible iliac artery disease, Hypertension, Atrial fibrillation (on warfarin), HFrEF (s/p ICD), CKD and CJ who presented with chills and diarrhea.         ALLERGIES:  Lovenox (Other)      PAST MEDICAL & SURGICAL HISTORY:  Coronary artery disease (history of myocardial infarction s/p PCI) History of mesenteric ischemia with stent to SMA  Possible iliac artery disease  Hypertension  HFrEF (s/p ICD-Abbott)  Obstructive sleep apnea  Hip surgery    CURRENT MEDICATIONS:  acetaminophen     Tablet .. 650 milliGRAM(s) Oral every 6 hours PRN  budesonide  80 MICROgram(s)/formoterol 4.5 MICROgram(s) Inhaler 2 Puff(s) Inhalation two times a day  carvedilol 25 milliGRAM(s) Oral every 12 hours  cefTRIAXone   IVPB 1000 milliGRAM(s) IV Intermittent every 24 hours  cholecalciferol 1000 Unit(s) Oral daily  levothyroxine 100 MICROGram(s) Oral daily  metroNIDAZOLE  IVPB 500 milliGRAM(s) IV Intermittent every 8 hours  pantoprazole    Tablet 40 milliGRAM(s) Oral before breakfast  tiotropium 18 MICROgram(s) Capsule 1 Capsule(s) Inhalation daily      FAMILY HISTORY:  FH: CAD (coronary artery disease) (Mother)        ROS:  All 10 systems reviewed and positives noted in HPI    OBJECTIVE:    VITAL SIGNS:  Vital Signs Last 24 Hrs  T(C): 36.9 (25 Apr 2022 08:00), Max: 39.3 (24 Apr 2022 20:52)  T(F): 98.5 (25 Apr 2022 08:00), Max: 102.7 (24 Apr 2022 20:52)  HR: 60 (25 Apr 2022 08:00) (60 - 61)  BP: 109/58 (25 Apr 2022 08:00) (109/58 - 181/81)  BP(mean): 75 (25 Apr 2022 01:00) (75 - 75)  RR: 16 (25 Apr 2022 08:00) (16 - 25)  SpO2: 95% (25 Apr 2022 08:00) (95% - 100%)    PHYSICAL EXAM:  General: well appearing, no distress  HEENT: sclera anicteric  Neck: supple, no carotid bruits b/l  CVS: JVP ~ 7 cm H20, RRR, s1, s2, no murmurs/rubs/gallops  Chest: unlabored respirations, clear to auscultation b/l  Abdomen: non-distended  Extremities: no lower extremity edema b/l  Neuro: awake, alert & oriented x 3  Psych: normal affect      LABS:                        10.4   7.87  )-----------( 77       ( 25 Apr 2022 06:25 )             32.8     04-25    145  |  110<H>  |  27<H>  ----------------------------<  110<H>  3.7   |  23  |  1.27    Ca    7.9<L>      25 Apr 2022 06:25  Mg     1.5     04-25    TPro  6.4  /  Alb  3.2<L>  /  TBili  0.6  /  DBili  x   /  AST  19  /  ALT  18  /  AlkPhos  53  04-25        PT/INR - ( 25 Apr 2022 06:25 )   PT: 32.1 sec;   INR: 2.74 ratio         PTT - ( 24 Apr 2022 21:10 )  PTT:35.6 sec        Outpatient TTE (4/2021):  LVEF 46%  Normal RV size and function  Mild AR  Moderate pulmonary hypertension  No pericardial effusion     ECG (4/24/22); venticular paced    CT Abdomen/Pelvis (4/24/22);  Although evaluation of bowel is limited without oral and intravenous   contrast, no definite CT evidence for colitis is suggested.  No bowel obstruction, significant ascites or free air.  Nonspecific mild stranding in the retroperitoneum and mesentery. Question infectious/inflammatory in etiology.    carv 25 bifd  lasix 40-80 daily  ramipril 2.5  MELISSA BROWER  [id number]      HPI:    Melissa Brower is an 87 year old obese man with past medical history of Coronary artery disease (history of myocardial infarction s/p PCI), history of mesenteric ischemia with stent to SMA, possible iliac artery disease, Hypertension, Atrial fibrillation (on warfarin), HFrEF (LVEF 46% in 2021) s/p ICD, CKD and CJ who presented with fever, chills and diarrhea.     The patient is present with his wife and reports having recent onset of chills and fever. Also reports having recurrent bouts of diarrhea. Reports chronic shortness of breath on exertion that is unchanged. Denies exertional chest pain. Denies palpitations. Reports compliance with medications.       ALLERGIES:  Lovenox (Other)      PAST MEDICAL & SURGICAL HISTORY:  Coronary artery disease (history of myocardial infarction s/p PCI) History of mesenteric ischemia with stent to SMA  Possible iliac artery disease  Hypertension  HFrEF (s/p ICD-Abbott)  Obstructive sleep apnea  Hip surgery    CURRENT MEDICATIONS:  acetaminophen     Tablet .. 650 milliGRAM(s) Oral every 6 hours PRN  budesonide  80 MICROgram(s)/formoterol 4.5 MICROgram(s) Inhaler 2 Puff(s) Inhalation two times a day  carvedilol 25 milliGRAM(s) Oral every 12 hours  cefTRIAXone   IVPB 1000 milliGRAM(s) IV Intermittent every 24 hours  cholecalciferol 1000 Unit(s) Oral daily  levothyroxine 100 MICROGram(s) Oral daily  metroNIDAZOLE  IVPB 500 milliGRAM(s) IV Intermittent every 8 hours  pantoprazole    Tablet 40 milliGRAM(s) Oral before breakfast  tiotropium 18 MICROgram(s) Capsule 1 Capsule(s) Inhalation daily      FAMILY HISTORY:  FH: CAD (coronary artery disease) (Mother)        ROS:  All 10 systems reviewed and positives noted in HPI    OBJECTIVE:    VITAL SIGNS:  Vital Signs Last 24 Hrs  T(C): 36.9 (25 Apr 2022 08:00), Max: 39.3 (24 Apr 2022 20:52)  T(F): 98.5 (25 Apr 2022 08:00), Max: 102.7 (24 Apr 2022 20:52)  HR: 60 (25 Apr 2022 08:00) (60 - 61)  BP: 109/58 (25 Apr 2022 08:00) (109/58 - 181/81)  BP(mean): 75 (25 Apr 2022 01:00) (75 - 75)  RR: 16 (25 Apr 2022 08:00) (16 - 25)  SpO2: 95% (25 Apr 2022 08:00) (95% - 100%)    PHYSICAL EXAM:  General: obese, elderly man, no acute distress  HEENT: sclera anicteric  Neck: supple, no carotid bruits b/l  CVS: JVP ~ 7 cm H20, RRR, s1, s2, no murmurs/rubs  Chest: unlabored respirations, clear to auscultation b/l  Abdomen: obese  Extremities: chronic, non-pitting lower extremity edema b/l  Neuro: awake, alert & oriented x 3  Psych: normal affect      LABS:                        10.4   7.87  )-----------( 77       ( 25 Apr 2022 06:25 )             32.8     04-25    145  |  110<H>  |  27<H>  ----------------------------<  110<H>  3.7   |  23  |  1.27    Ca    7.9<L>      25 Apr 2022 06:25  Mg     1.5     04-25    TPro  6.4  /  Alb  3.2<L>  /  TBili  0.6  /  DBili  x   /  AST  19  /  ALT  18  /  AlkPhos  53  04-25        PT/INR - ( 25 Apr 2022 06:25 )   PT: 32.1 sec;   INR: 2.74 ratio         PTT - ( 24 Apr 2022 21:10 )  PTT:35.6 sec        Outpatient TTE (4/2021):  LVEF 46%  Normal RV size and function  Mild AR  Moderate pulmonary hypertension  No pericardial effusion     ECG (4/24/22); ventricular paced    CT Abdomen/Pelvis (4/24/22);  Although evaluation of bowel is limited without oral and intravenous   contrast, no definite CT evidence for colitis is suggested.  No bowel obstruction, significant ascites or free air.  Nonspecific mild stranding in the retroperitoneum and mesentery. Question infectious/inflammatory in etiology.

## 2022-04-25 NOTE — PATIENT PROFILE ADULT - NSPROHMSYMPCOND_GEN_A_NUR
Patient states that they cannot do anything physical due to strain from breathing difficulty/cancer/cardiovascular/musculoskeletal/respiratory

## 2022-04-25 NOTE — PROGRESS NOTE ADULT - SUBJECTIVE AND OBJECTIVE BOX
Patient is a 87y old  Male who presents with a chief complaint of chills and watery diarrhea diagnosed with sepsis (2022 10:39)      Patient seen and examined at bedside. No overnight events reported. Pt has not had an episode of diarrhea today. Pt states he has back pain.     ALLERGIES:  Lovenox (Other)    MEDICATIONS  (STANDING):  budesonide  80 MICROgram(s)/formoterol 4.5 MICROgram(s) Inhaler 2 Puff(s) Inhalation two times a day  carvedilol 25 milliGRAM(s) Oral every 12 hours  cefTRIAXone   IVPB 1000 milliGRAM(s) IV Intermittent every 24 hours  cholecalciferol 1000 Unit(s) Oral daily  levothyroxine 100 MICROGram(s) Oral daily  metroNIDAZOLE  IVPB 500 milliGRAM(s) IV Intermittent every 8 hours  pantoprazole    Tablet 40 milliGRAM(s) Oral before breakfast  tiotropium 18 MICROgram(s) Capsule 1 Capsule(s) Inhalation daily    MEDICATIONS  (PRN):  acetaminophen     Tablet .. 650 milliGRAM(s) Oral every 6 hours PRN Temp greater or equal to 38C (100.4F), Mild Pain (1 - 3)    Vital Signs Last 24 Hrs  T(F): 98.5 (2022 08:00), Max: 102.7 (2022 20:52)  HR: 67 (2022 11:06) (60 - 67)  BP: 109/58 (2022 08:00) (109/58 - 181/81)  RR: 16 (2022 08:00) (16 - 25)  SpO2: 97% (2022 11:06) (95% - 100%)  I&O's Summary    PHYSICAL EXAM:  General: NAD, A/O x 3  ENT: No gross hearing impairment, Moist mucous membranes, no thrush  Neck: Supple, No JVD  Lungs: Minimally labored breathing, clear to auscultation bilaterally,  Cardio: RRR, S1/S2, No murmur  Abdomen: Soft, Nontender, Nondistended; Bowel sounds present  Extremities: B/L LE edema, No calf tenderness, No cyanosis  Psych: Appropriate mood and affect    LABS:                        10.4   7.87  )-----------( 77       ( 2022 06:25 )             32.8     04-25    145  |  110  |  27  ----------------------------<  110  3.7   |  23  |  1.27    Ca    7.9      2022 06:25  Mg     1.5         TPro  6.4  /  Alb  3.2  /  TBili  0.6  /  DBili  x   /  AST  19  /  ALT  18  /  AlkPhos  53            PT/INR - ( 2022 06:25 )   PT: 32.1 sec;   INR: 2.74 ratio         PTT - ( 2022 21:10 )  PTT:35.6 sec  Lactate, Blood: 1.7 mmol/L ( @ 23:56)  Lactate, Blood: 2.2 mmol/L ( @ 21:10)    Procalcitonin, Serum: 0.29 ng/mL (22 @ 06:25)  Procalcitonin, Serum: 0.04 ng/mL (22 @ 21:10)    CARDIAC MARKERS ( 2022 06:25 )  x     / 67.2 ng/L / x     / x     / x      CARDIAC MARKERS ( 2022 23:10 )  x     / 70.1 ng/L / x     / x     / x      CARDIAC MARKERS ( 2022 21:10 )  x     / 37.1 ng/L / x     / x     / x            TSH 2.212   TSH with FT4 reflex --  Total T3 --                  Urinalysis Basic - ( 2022 21:25 )    Color: Yellow / Appearance: Clear / S.020 / pH: x  Gluc: x / Ketone: Negative  / Bili: Negative / Urobili: Negative   Blood: x / Protein: 30 mg/dL / Nitrite: Negative   Leuk Esterase: Negative / RBC: 0-4 /HPF / WBC 0-2 /HPF   Sq Epi: x / Non Sq Epi: Neg.-Few / Bacteria: Trace /HPF          RADIOLOGY & ADDITIONAL TESTS:    Care Discussed with Consultants/Other Providers:    Patient is a 87y old  Male who presents with a chief complaint of chills and watery diarrhea diagnosed with sepsis (2022 10:39)      Patient seen and examined at bedside. No overnight events reported. No episodes of diarrhea today.   Admits to back pain.   States he has not urinated since admission.      ALLERGIES:  Lovenox (Other)    MEDICATIONS  (STANDING):  budesonide  80 MICROgram(s)/formoterol 4.5 MICROgram(s) Inhaler 2 Puff(s) Inhalation two times a day  carvedilol 25 milliGRAM(s) Oral every 12 hours  cefTRIAXone   IVPB 1000 milliGRAM(s) IV Intermittent every 24 hours  cholecalciferol 1000 Unit(s) Oral daily  levothyroxine 100 MICROGram(s) Oral daily  metroNIDAZOLE  IVPB 500 milliGRAM(s) IV Intermittent every 8 hours  pantoprazole    Tablet 40 milliGRAM(s) Oral before breakfast  tiotropium 18 MICROgram(s) Capsule 1 Capsule(s) Inhalation daily    MEDICATIONS  (PRN):  acetaminophen     Tablet .. 650 milliGRAM(s) Oral every 6 hours PRN Temp greater or equal to 38C (100.4F), Mild Pain (1 - 3)    Vital Signs Last 24 Hrs  T(F): 98.5 (2022 08:00), Max: 102.7 (2022 20:52)  HR: 67 (2022 11:06) (60 - 67)  BP: 109/58 (2022 08:00) (109/58 - 181/81)  RR: 16 (2022 08:00) (16 - 25)  SpO2: 97% (2022 11:06) (95% - 100%)  I&O's Summary    PHYSICAL EXAM:  General: NAD, A/O x 3, on 2L NC  ENT: No gross hearing impairment, Moist mucous membranes, no thrush  Neck: Supple, No JVD  Lungs: poor inspiratory effort, diminished breath sounds at bases, no wheezing  Cardio: RRR, S1/S2, No murmur  Abdomen: Soft, Nontender, Nondistended; Bowel sounds present, obese  Extremities: 3+ pitting edema in bilateral lower extremities  Psych: Appropriate mood and affect, good judgement      LABS:                        10.4   7.87  )-----------( 77       ( 2022 06:25 )             32.8     04-25    145  |  110  |  27  ----------------------------<  110  3.7   |  23  |  1.27    Ca    7.9      2022 06:25  Mg     1.5         TPro  6.4  /  Alb  3.2  /  TBili  0.6  /  DBili  x   /  AST  19  /  ALT  18  /  AlkPhos  53            PT/INR - ( 2022 06:25 )   PT: 32.1 sec;   INR: 2.74 ratio         PTT - ( 2022 21:10 )  PTT:35.6 sec  Lactate, Blood: 1.7 mmol/L ( @ 23:56)  Lactate, Blood: 2.2 mmol/L ( @ 21:10)    Procalcitonin, Serum: 0.29 ng/mL (22 @ 06:25)  Procalcitonin, Serum: 0.04 ng/mL (22 @ 21:10)    CARDIAC MARKERS ( 2022 06:25 )  x     / 67.2 ng/L / x     / x     / x      CARDIAC MARKERS ( 2022 23:10 )  x     / 70.1 ng/L / x     / x     / x      CARDIAC MARKERS ( 2022 21:10 )  x     / 37.1 ng/L / x     / x     / x            TSH 2.212   TSH with FT4 reflex --  Total T3 --                  Urinalysis Basic - ( 2022 21:25 )    Color: Yellow / Appearance: Clear / S.020 / pH: x  Gluc: x / Ketone: Negative  / Bili: Negative / Urobili: Negative   Blood: x / Protein: 30 mg/dL / Nitrite: Negative   Leuk Esterase: Negative / RBC: 0-4 /HPF / WBC 0-2 /HPF   Sq Epi: x / Non Sq Epi: Neg.-Few / Bacteria: Trace /HPF          RADIOLOGY & ADDITIONAL TESTS:  < from: CT Lumbar Spine No Cont (22 @ 11:27) >    ACC: [id number] EXAM:  CT LUMBAR SPINE                          PROCEDURE DATE:  2022          INTERPRETATION:  CT lumbar spine without IV contrast    CLINICAL INFORMATION: Fever   Back pain.    TECHNIQUE:  Contiguous axial 2 mm sections were obtained through the   lumbar spine using a single helical acquisition.   Additional 2 mm   sagittal and coronal reconstructions of the spine were obtained. These   additional reformatted images were used to evaluate the spine for   alignment, vertebral fractures and the integrity of the the posterior   elements.  This scan was performed using automatic exposure control   (radiation dose reduction software) to obtain a diagnostic image quality   scan with patient dose as low as reasonably achievable.    FINDINGS:   No prior similar studies are available for review    Lumbar vertebral body heights are maintained. No vertebral fracture is   seen. No destructive bone lesion is found.  Alignment is significant for   levoscoliosis with apex at L2-3. Facet joints appear aligned.  The   visualized sacral and pelvic bones appear intact.    Lumbar intervertebral disc spaces show endplate erosions at this inferior   endplate of L2 and superior endplate of L3 with paravertebral soft tissue   suspicious for discitis. MRI with gadolinium recommended for further   evaluation.    Diffuse degenerative disc is and spondylosis is noted with loss of disc   height and associated degenerative endplate changes. Disc bulges are   noted at L1-2 through L5-S1 which flatten the ventral thecal sac and   narrow the BILATERAL neural foramina. Mild central stenosis at L1-2 and   L2-3 with moderate central stenosis at L3-4 and L4-5 on a degenerative   basis due to disc bulge, facet osteophytic hypertrophy and redundancy of   ligamentum flavum.    No paraspinal mass is recognized.  Paraspinal soft tissues appear intact.      IMPRESSION:  Endplate erosions at this inferior endplate of L2 and   superior endplate of L3 with paravertebral soft tissue suspicious for   discitis. MRI with gadolinium recommended for further evaluation.   No vertebral fracture is recognized.  Diffuse degenerative disc is and   spondylosis is noted with loss of disc height and associated degenerative   endplate changes. Disc bulgesare noted at L1-2 through L5-S1 which   flatten the ventral thecal sac and narrow the BILATERAL neural foramina.   Mild central stenosis at L1-2 and L2-3 with moderate central stenosis at   L3-4 and L4-5 on a degenerative basis due to disc bulge, facet   osteophytic hypertrophy and redundancy of ligamentum flavum.    --- End of Report ---            STEFANIE SCHERER MD; Attending Radiologist  This document has been electronically signed. 2022 12:22PM    < end of copied text >      Care Discussed with Consultants/Other Providers:

## 2022-04-25 NOTE — CONSULT NOTE ADULT - ASSESSMENT
Patient is a 87y male with a past history of CAD, A fib, DM, mesenteric ischemia with SMA stent, ICD/PPM, possible MDS, admitted with fever to 102.7, chills and diarrhea on 4/24.  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  He was feeling okay until yesterday when he developed severe chills and had to go inside.He than has diarrhea and became weak, came to ER where his fever was 102.7. CT of A/P without acute pathology, Ct of LS spine with concerns raised of discitis of L2-L3.This study was not compared with prior study 2 months ago .He denies any recent OPD infections or antibiotic use.He was started on CTX and metronidazole.He also received a dose of pip-tazobactam.  This is a difficult case in that while I normally would advise observing off antibiotics when there is a concern of discitis so that a biopsy can be obtained, If there are concerns of sepsis which existed on 4/24 on admission we have to initiate antibiotics.  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 I am not sure if diarrhea was a  nonspecific symptom of sepsis or he truly had a primary GI illness. Given negative CT scan of A/P, benign abdomin, and lack of diarrhea I tend to doubt a primary GI illness.   He has ongoing lower back pain, if CT findings are new c/w Feb 2022 than I would be concerned he has an infectious process. However, not clear if a spinal process accounting for fever to 102.7 on admission, as he has pain x months without any fever and usually discitis is an indolent infection unless related to an aggressive organism such as staph aureus  Suggest:  1. Await blood cultures  2. Okay to continue CTX and metronidazole another 24 hours, if cultures remain negative will consider stopping  3. Favor MRI of spine if ICD will allow  4. He may require IR disc aspirate  5.GI PCR if diarrhea returns  6.Favor Heme-Onc f/u  7.Additional w/u pending above
This is an 87 year old man with a history fo hypertension, hyperlipidemia, mesenteric ischemia, with SMA stents, iliac artery aneurysm, CHF, AICD, hypothyroidism, CJ. Patient presents with sepsis, unknown source, watery diarrhea for a couple of days, chills.  Patient noted to be more anemic on hospital day 2.  Hg 10.2g/dl, Platelets 72k/ul, [id number].  Patient known to our service at [facility], had a mild anemia with a macrocytosis for a couple of years now Baseline Hg [id number]/dl, platelets in the 120's to 130's -105FL. 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  Painted has been followed periodically by Dr. Aarti Langston and Dr. Tiffanie Gonzalez over the years.  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  Now counts are slightly lower at present time given curent febrile episode.  The platelet count of 77k/ul is adequate for now.  Hg 10.2g/dl should be fairly asymptomatic Would follow the CBC along with you, if it rapidly falls could consider a repeat bone marrow biopsy as an outpatient.     Would check B12, folic acid, and iron rule out nutritional deficiencies  Check Haptoglobin LDH rule out hemolysis.   There is no sign of a  plasma cell dyscrasia on BM biopsy but that was 2 years ago. Coudl repeat SPEP and DEEPIKA.    Reviewed peripheral smear, significant left shift indicating infections or inflammatory etiology 64% mature segmented neutrophils 13% bands, 4% monocytes, 18 % lymphocytes, 1% eosinophils.  RBC mostly normocytic and normochromic, some macrocytosis with out basophilic stippling significant anisocytosis which is a feature of dysplasia. No schistocytes . Platelets with some large or giant platelets, over all mostly normal morphology normal granulation.  
Assessment:  Jj Burciaga is an 87 year old obese man with past medical history of Coronary artery disease (history of myocardial infarction s/p PCI), history of mesenteric ischemia with stent to SMA, possible iliac artery disease, Hypertension, Atrial fibrillation (on warfarin), HFrEF (LVEF 46% in 2021) s/p ICD, CKD, CJ & thrombocytopenia who presented with fever, chills and diarrhea.     Cardiology consulted to evaluate for CHF. ECG consistent with ventricular paced rhythm. Troponins at upper limits of normal. Presentation does not appear to be primarily an acute coronary syndrome. Pro BNP elevated but may be due to cardiomyopathy. Prior echocardiogram form 4/2021 consistent with LVEF 46% and moderate pulmonary hypertension.     Recommendations:  [] Fever, chills, diarrhea: Recommend infectious workup per primary team  [] Mild HFrEF: No signs of decompensated heart failure on physical exam, may hold home Lasix (takes 40 mg daily) given concern for infection. May continue home Carvedilol 25 mg BID if BP allows. Hold ACE-I due to elevated Cr on presentation. Follow up echo   [] Atrial fibrillation: Continue home coumadin    We will continue to follow along.    Flor Vidal MD  Cardiology

## 2022-04-25 NOTE — CONSULT NOTE ADULT - SUBJECTIVE AND OBJECTIVE BOX
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  He was feeling okay until yesterday when he developed severe chills and had to go inside.He than has diarrhea and became weak, came to ER where his fever was 102.7. CT of A/P without acute pathology, Ct of LS spine with concerns raised of discitis of L2-L3.This study was not compared with prior study 2 months ago .He denies any recent OPD infections or antibiotic use.He was started on CTX and metronidazole.He also received a dose of pip-tazobactam.    REVIEW OF SYSTEMS:  All other review of systems negative (Comprehensive ROS)    PAST MEDICAL & SURGICAL HISTORY:  Coronary artery disease due to calcified coronary lesion    Atrial fibrillation    Iliac artery aneurysm    Chronic systolic congestive heart failure    Gastroesophageal reflux disease without esophagitis    Bilateral edema of lower extremity    Essential hypertension    HLD (hyperlipidemia)    AICD (automatic cardioverter/defibrillator) present    Other specified hypothyroidism    CJ (obstructive sleep apnea)    Arthritis    Venous insufficiency    Skin cancer    History of hip surgery  right    Status post insertion of iliac artery stent        Allergies    Lovenox (Other)    Intolerances        Antimicrobials Day #  :day 2  cefTRIAXone   IVPB 1000 milliGRAM(s) IV Intermittent every 24 hours  metroNIDAZOLE  IVPB 500 milliGRAM(s) IV Intermittent every 8 hours    Other Medications:  acetaminophen     Tablet .. 650 milliGRAM(s) Oral every 6 hours PRN  benzocaine 15 mG/menthol 3.6 mG Lozenge 1 Lozenge Oral five times a day PRN  budesonide  80 MICROgram(s)/formoterol 4.5 MICROgram(s) Inhaler 2 Puff(s) Inhalation two times a day  carvedilol 25 milliGRAM(s) Oral every 12 hours  cholecalciferol 1000 Unit(s) Oral daily  folic acid 1 milliGRAM(s) Oral daily  levothyroxine 100 MICROGram(s) Oral daily  magnesium sulfate  IVPB 1 Gram(s) IV Intermittent once  pantoprazole    Tablet 40 milliGRAM(s) Oral before breakfast  tiotropium 18 MICROgram(s) Capsule 1 Capsule(s) Inhalation daily  warfarin 5 milliGRAM(s) Oral once      FAMILY HISTORY:  FH: CAD (coronary artery disease) (Mother)        SOCIAL HISTORY:  Smoking: x    ETOH:  x   Drug Use: x     T(F): 98.5 (22 @ 08:00), Max: 102.7 (22 @ 20:52)  HR: 67 (22 @ 11:06)  BP: 109/58 (22 @ 08:00)  RR: 16 (22 @ 08:00)  SpO2: 97% (22 @ 11:06)  Wt(kg): --    PHYSICAL EXAM:  General: alert, no acute distress, obese  Eyes:  anicteric, no conjunctival injection, no discharge  Oropharynx: no lesions or injection 	  Neck: supple, without adenopathy  Lungs: clear to auscultation  Heart: irregular rate and rhythm; no murmur, rubs or gallops  Abdomen: soft, nondistended, nontender, without mass or organomegaly, obese  Skin: no lesions  Extremities: no clubbing, cyanosis,+ edema  Neurologic: alert, oriented, moves all extremities    LAB RESULTS:                        10.4   7.87  )-----------( 77       ( 2022 06:25 )             32.8     25    145  |  110<H>  |  27<H>  ----------------------------<  110<H>  3.7   |  23  |  1.27    Ca    7.9<L>      2022 06:25  Mg     1.5         TPro  6.4  /  Alb  3.2<L>  /  TBili  0.6  /  DBili  x   /  AST  19  /  ALT  18  /  AlkPhos  53      LIVER FUNCTIONS - ( 2022 06:25 )  Alb: 3.2 g/dL / Pro: 6.4 g/dL / ALK PHOS: 53 U/L / ALT: 18 U/L / AST: 19 U/L / GGT: x           Urinalysis Basic - ( 2022 21:25 )    Color: Yellow / Appearance: Clear / S.020 / pH: x  Gluc: x / Ketone: Negative  / Bili: Negative / Urobili: Negative   Blood: x / Protein: 30 mg/dL / Nitrite: Negative   Leuk Esterase: Negative / RBC: 0-4 /HPF / WBC 0-2 /HPF   Sq Epi: x / Non Sq Epi: Neg.-Few / Bacteria: Trace /HPF        MICROBIOLOGY:  RECENT CULTURES:        RADIOLOGY REVIEWED:  < from: CT Lumbar Spine No Cont (22 @ 11:27) >    IMPRESSION:  Endplate erosions at this inferior endplate of L2 and   superior endplate of L3 with paravertebral soft tissue suspicious for   discitis. MRI with gadolinium recommended for further evaluation.   No vertebral fracture is recognized.  Diffuse degenerative disc is and   spondylosis is noted with loss of disc height and associated degenerative   endplate changes. Disc bulgesare noted at L1-2 through L5-S1 which   flatten the ventral thecal sac and narrow the BILATERAL neural foramina.   Mild central stenosis at L1-2 and L2-3 with moderate central stenosis at   L3-4 and L4-5 on a degenerative basis due to disc bulge, facet   osteophytic hypertrophy and redundancy of ligamentum flavum.    --- End of Report ---    < end of copied text >  < from: CT Abdomen and Pelvis No Cont (22 @ 21:53) >  IMPRESSION:    Although evaluation of bowel is limited without oral and intravenous   contrast, no definite CT evidence for colitis is suggested.    < end of copied text >  < from: CT Lumbar Spine No Cont (22 @ 14:16) >  IMPRESSION:    Moderate to severe multilevel spondylosis in the setting of levocurvature   as described above.    < end of copied text >

## 2022-04-25 NOTE — PATIENT PROFILE ADULT - FALL HARM RISK - HARM RISK INTERVENTIONS

## 2022-04-25 NOTE — CONSULT NOTE ADULT - SUBJECTIVE AND OBJECTIVE BOX
This is an 87 year old man with a history fo hypertension, hyperlipidemia, mesenteric ischemia, with SMA stents, iliac artery aneurysm, CHF, AICD, hypothyroidism, CJ. Patient presents with sepsis, unknown source, watery diarrhea for a couple of days, chills.  Patient noted to be more anemic on hospital day 2.  Hg 10.2g/dl, Platelets 72k/ul, [id number].  Patient known to our service at [facility], had a mild anemia with a macrocytosis for a couple of years now Baseline Hg oketcff76.5g/dl, platelets in the 120's to 130's -105FL. 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  Painted has been followed periodically by Dr. Aarti Langston and Dr. Tiffanie Gonzalez over the years.   With the febrile illness and diarrhea, Hg had decreased slightly and  platelets down to 77k/ul.      MEDICATIONS  (STANDING):  budesonide  80 MICROgram(s)/formoterol 4.5 MICROgram(s) Inhaler 2 Puff(s) Inhalation two times a day  carvedilol 25 milliGRAM(s) Oral every 12 hours  cefTRIAXone   IVPB 1000 milliGRAM(s) IV Intermittent every 24 hours  cholecalciferol 1000 Unit(s) Oral daily  folic acid 1 milliGRAM(s) Oral daily  levothyroxine 100 MICROGram(s) Oral daily  magnesium sulfate  IVPB 1 Gram(s) IV Intermittent once  metroNIDAZOLE  IVPB 500 milliGRAM(s) IV Intermittent every 8 hours  pantoprazole    Tablet 40 milliGRAM(s) Oral before breakfast  tiotropium 18 MICROgram(s) Capsule 1 Capsule(s) Inhalation daily  warfarin 5 milliGRAM(s) Oral once    MEDICATIONS  (PRN):  acetaminophen     Tablet .. 650 milliGRAM(s) Oral every 6 hours PRN Temp greater or equal to 38C (100.4F), Mild Pain (1 - 3)  benzocaine 15 mG/menthol 3.6 mG Lozenge 1 Lozenge Oral five times a day PRN Sore Throat    Vital Signs Last 24 Hrs  T(C): 36.8 (25 Apr 2022 16:19), Max: 39.3 (24 Apr 2022 20:52)  T(F): 98.3 (25 Apr 2022 16:19), Max: 102.7 (24 Apr 2022 20:52)  HR: 60 (25 Apr 2022 16:19) (60 - 67)  BP: 111/63 (25 Apr 2022 16:19) (109/58 - 181/81)  BP(mean): 75 (25 Apr 2022 01:00) (75 - 75)  RR: 20 (25 Apr 2022 16:19) (16 - 25)  SpO2: 99% (25 Apr 2022 16:19) (95% - 100%)    04-25    145  |  110<H>  |  27<H>  ----------------------------<  110<H>  3.7   |  23  |  1.27    Ca    7.9<L>      25 Apr 2022 06:25  Mg     1.5     04-25    TPro  6.4  /  Alb  3.2<L>  /  TBili  0.6  /  DBili  x   /  AST  19  /  ALT  18  /  AlkPhos  53  04-25                          10.2   6.16  )-----------( 78       ( 25 Apr 2022 17:20 )             31.8

## 2022-04-25 NOTE — PHARMACOTHERAPY INTERVENTION NOTE - COMMENTS
Met with patient and reviewed current medications. Answered all questions and concerns. Patient confirms good understanding.

## 2022-04-25 NOTE — PROGRESS NOTE ADULT - ASSESSMENT
87M hx of HTN, HLD, hx of mesenteric ischemia with SMA stent x 2, iliac artery aneurysm, hx of CHFrEF, AICD, hypothyroidism, venous insufficiency, CJ not on CPAP pw nausea, diarrhea and sepsis    # Diarrhea with sepsis (fever, lactate, RUFINO)  -Lactate normalized (1.7 on )  s/p Zosyn in ED (discontinued)  Continue IV Flagyl  IV Rocephin for 7 days ( day 1)  no overt source CXR, UA, CTAP negative for colitis, RVP/COVID negative.   significant increased back pain from baseline. CTAP no overt findings in lumbar region. Check dedicated CT L/S to be complete.   ID consult.   check stool for cultures, GI PCR Panel.     #RUFINO  hold Lasix and ramipril for 1-2 days until cr back to baseline    #HTN  cont coreg. hold ACE Cr has improved to 1.27 from 1.62, but       #CHFrEF  cont coreg. hold lasix and ramipril for now  weights, I/Os  2nd trop 70.1 from 37.1 with elevated delta. probably from demand ischemia sec to sepsis. Trop is now downtrending to 67.2 will have cardio follow. Pending serial EKG, ECHO, cardiology consult. on coumadin/BB.     #Hypothyroid  cont Levothyroxine 100 (as stated in [facility]) but pt's list stated 75mcg. Call [facility] in am to confirm dose.  check TSH     #DVT/GI proph  on coumadin with therapeutic INR, check daily inr and dose coumadin. cont PPI.   low suspicion for PE/DVT as pt on coumadin and pt related INR has been high recently and coumadin dose has been reduced and currently INR therapeutic.     Wife and daughter       CAPRINI SCORE [CLOT]    AGE RELATED RISK FACTORS                                                       MOBILITY RELATED FACTORS  [ ] Age 41-60 years                                            (1 Point)                  [ ] Bed rest                                                        (1 Point)  [ ] Age: 61-74 years                                           (2 Points)                 [ ] Plaster cast                                                   (2 Points)  [3 ] Age= 75 years                                              (3 Points)                 [ ] Bed bound for more than 72 hours                 (2 Points)    DISEASE RELATED RISK FACTORS                                               GENDER SPECIFIC FACTORS  [1 ] Edema in the lower extremities                       (1 Point)                  [ ] Pregnancy                                                     (1 Point)  [ ] Varicose veins                                               (1 Point)                  [ ] Post-partum < 6 weeks                                   (1 Point)             [1 ] BMI > 25 Kg/m2                                            (1 Point)                  [ ] Hormonal therapy  or oral contraception          (1 Point)                 [ ] Sepsis (in the previous month)                        (1 Point)                  [ ] History of pregnancy complications                 (1 point)  [ ] Pneumonia or serious lung disease                                               [ ] Unexplained or recurrent                     (1 Point)           (in the previous month)                               (1 Point)  [ ] Abnormal pulmonary function test                     (1 Point)                 SURGERY RELATED RISK FACTORS  [ ] Acute myocardial infarction                              (1 Point)                 [ ]  Section                                             (1 Point)  [ ] Congestive heart failure (in the previous month)  (1 Point)               [ ] Minor surgery                                                  (1 Point)   [ ] Inflammatory bowel disease                             (1 Point)                 [ ] Arthroscopic surgery                                        (2 Points)  [ ] Central venous access                                      (2 Points)                [ ] General surgery lasting more than 45 minutes   (2 Points)       [ ] Stroke (in the previous month)                          (5 Points)               [ ] Elective arthroplasty                                         (5 Points)                                                                                                                                               HEMATOLOGY RELATED FACTORS                                                 TRAUMA RELATED RISK FACTORS  [ ] Prior episodes of VTE                                     (3 Points)                [ ] Fracture of the hip, pelvis, or leg                       (5 Points)  [ ] Positive family history for VTE                         (3 Points)                 [ ] Acute spinal cord injury (in the previous month)  (5 Points)  [ ] Prothrombin 12432 A                                     (3 Points)                 [ ] Paralysis  (less than 1 month)                             (5 Points)  [ ] Factor V Leiden                                             (3 Points)                  [ ] Multiple Trauma within 1 month                        (5 Points)  [ ] Lupus anticoagulants                                     (3 Points)                                                           [ ] Anticardiolipin antibodies                               (3 Points)                                                       [ ] High homocysteine in the blood                      (3 Points)                                             [ ] Other congenital or acquired thrombophilia      (3 Points)                                                [ ] Heparin induced thrombocytopenia                  (3 Points)                                          Total Score [       5   ]    Caprini Score 0 - 2:  Low Risk, No VTE Prophylaxis required for most patients, encourage ambulation  Caprini Score 3 - 6:  At Risk, pharmacologic VTE prophylaxis is indicated for most patients (in the absence of a contraindication)  Caprini Score Greater than or = 7:  High Risk, pharmacologic VTE prophylaxis is indicated for most patients (in the absence of a contraindication) 87 year old obese man with past medical history of Coronary artery disease (history of myocardial infarction s/p PCI), history of mesenteric ischemia with stent to SMA, possible iliac artery disease, Hypertension, Atrial fibrillation (on warfarin), HFrEF (LVEF 46% in 2021) s/p ICD, CKD, CJ & thrombocytopenia who presented with fever, chills and diarrhea.     #Diarrhea with sepsis (fever, lactate, RUFINO)  #r/o discitis   - Lactate normalized, afebrile   - No overt source CXR, UA, CTAP negative for colitis, RVP/COVID negative.   - Significant increased back pain from baseline  - CT Lumbar Spine: Endplate erosions at this inferior endplate of L2 and superior endplate of L3 with paravertebral soft tissue suspicious for discitis.   - Will order MRI with contrast lumbar spine  - Continue IV Flagyl and ceftriaxone (4/25 day 1)  - f/u blood culture  - Check bladder scan   - Check stool for cultures, GI PCR Panel.   - Continue clear liquid for now  - ID consult pending     #RUFINO on CKD3   - Unknown baseline   - Held Lasix and ramipril on admission for RUFINO  - Cr improving, now 1.27  - Continue to hold for now, BPs acceptable     #Hypomagnesemia  - Replete  - Repeat in AM    #HTN  - Cont coreg with hold parameters  - Continue to hold ACEi for now for RUFINO     #CHFrEF  #Atrial fibrillation  #s/p ICD  - Noted elevated trop, now downtrending   - No signs of decompensated heart failure on physical exam  - Cont coreg with hold parameters   - Continue coumadin with daily INR  - Continue to hold ACEi and lasix for now for RUFINO   - f/u TTE  - Cardiology consulted     #hx of mesenteric ischemia with SMA stent x 2  - Continue coumadin    #Hypothyroid  - Cont Levothyroxine 100 (confirmed by pharmacy)  - TSH normal    #DVT/GI proph  - Coumadin  - PPI    Dispo: Pending above.      87 year old obese man with past medical history of Coronary artery disease (history of myocardial infarction s/p PCI), history of mesenteric ischemia with stent to SMA, possible iliac artery disease, Hypertension, Atrial fibrillation (on warfarin), HFrEF (LVEF 46% in 2021) s/p ICD, CKD, CJ & thrombocytopenia who presented with fever, chills and diarrhea.     #Diarrhea with sepsis (fever, lactate, RUFINO)  #r/o discitis   - Lactate normalized, afebrile   - No overt source CXR, UA, CTAP negative for colitis, RVP/COVID negative.   - Significant increased back pain from baseline  - CT Lumbar Spine: Endplate erosions at this inferior endplate of L2 and superior endplate of L3 with paravertebral soft tissue suspicious for discitis.   - Will order MRI with contrast lumbar spine  - Continue IV Flagyl and ceftriaxone (4/25 day 1)  - f/u blood culture  - Check bladder scan   - Check stool for cultures, GI PCR Panel.   - Continue clear liquid for now  - ID consult pending     #RUFINO on CKD3   - Unknown baseline   - Held Lasix and ramipril on admission for RUFINO  - Cr improving, now 1.27  - Continue to hold for now, BPs acceptable     #Hypomagnesemia  - Replete  - Repeat in AM    #Macrocytic anemia  -Folic acid supplement    #HTN  - Cont coreg with hold parameters  - Continue to hold ACEi for now for RUFINO     #CHFrEF  #Atrial fibrillation  #s/p ICD  - Noted elevated trop, now downtrending   - No signs of decompensated heart failure on physical exam  - Cont coreg with hold parameters   - Continue coumadin with daily INR  - Continue to hold ACEi and lasix for now for RUFINO   - f/u TTE  - Cardiology consulted     #hx of mesenteric ischemia with SMA stent x 2  - Continue coumadin    #Hypothyroid  - Cont Levothyroxine 100 (confirmed by pharmacy)  - TSH normal    #DVT/GI proph  - Coumadin  - PPI    Dispo: Pending above.      87 year old obese man with past medical history of Coronary artery disease (history of myocardial infarction s/p PCI), history of mesenteric ischemia with stent to SMA, possible iliac artery disease, Hypertension, Atrial fibrillation (on warfarin), HFrEF (LVEF 46% in 2021) s/p ICD, CKD, CJ & thrombocytopenia who presented with fever, chills and diarrhea.     #Severe sepsis - present on admission - likely source ?discitis ?diarrhea   -Meets severe sepsis due to fever 102.7, RR 21. Lactate >2.0 and RUFINO  -CT Lumbar Spine: Endplate erosions at this inferior endplate of L2 and superior endplate of L3 with paravertebral soft tissue suspicious for discitis.   -Continue IV Flagyl and Rocephin (Day 1)  -Follow up blood cultures x 2  -Continue Tylenol PRN for fever  -Bladder scan and straight cath for ?urinary retention   -Check stool for cultures, GI PCR Panel.   -Advance diet to PO diet  -ID consult    #RUFINO on CKD3   -Unknown baseline   -Held Lasix and ramipril for RUFINO  -Cr improving, now 1.27  -Avoid nephrotoxic medications  -Follow up AM BMP    #Hypomagnesemia  -Replete  -Repeat in AM    #Macrocytic anemia  -Folic acid supplement  -No overt signs of bleeding  -Follow up routine CBC    #Thrombocytopenia   -Chronic, per chart review  -Continue to monitor CBC    #HTN  -Cont coreg with hold parameters  -Continue to hold ACEi for now for RUFINO   -Monitor vitals    #CHFrEF  #Chronic atrial fibrillation  #s/p ICD  -No signs of decompensated heart failure on physical exam  -Cont coreg with hold parameters for rate control  -Continue coumadin with daily INR. Monitor for toxicity. Goal INR 2-3  -Coumadin 5mg daily (hold dose 7.5mg daily)  -Continue to hold ACEi and lasix for now for RUFINO   -Follow up TTE  - Cardiology consulted Dr Vidal, recommendations appreciated.    #hx of mesenteric ischemia with SMA stent x 2  - Continue coumadin    #Hypothyroidism  -Cont Levothyroxine 100 (confirmed by pharmacy)  - TSH normal    #DVT/GI proph  - Coumadin  - PPI    Dispo: Pending above.       Discussed with wife Ceci Burciaga [phone].

## 2022-04-26 LAB
ANION GAP SERPL CALC-SCNC: 9 MMOL/L — SIGNIFICANT CHANGE UP (ref 5–17)
BUN SERPL-MCNC: 26 MG/DL — HIGH (ref 7–23)
CALCIUM SERPL-MCNC: 8.1 MG/DL — LOW (ref 8.4–10.5)
CHLORIDE SERPL-SCNC: 103 MMOL/L — SIGNIFICANT CHANGE UP (ref 96–108)
CO2 SERPL-SCNC: 26 MMOL/L — SIGNIFICANT CHANGE UP (ref 22–31)
CREAT SERPL-MCNC: 1.31 MG/DL — HIGH (ref 0.5–1.3)
CULTURE RESULTS: NO GROWTH — SIGNIFICANT CHANGE UP
EGFR: 52 ML/MIN/1.73M2 — LOW
FERRITIN SERPL-MCNC: 219 NG/ML — SIGNIFICANT CHANGE UP (ref 30–400)
FOLATE SERPL-MCNC: 18 NG/ML — SIGNIFICANT CHANGE UP
GLUCOSE SERPL-MCNC: 112 MG/DL — HIGH (ref 70–99)
HAPTOGLOB SERPL-MCNC: 186 MG/DL — SIGNIFICANT CHANGE UP (ref 34–200)
HCT VFR BLD CALC: 32.4 % — LOW (ref 39–50)
HGB BLD-MCNC: 10.4 G/DL — LOW (ref 13–17)
INR BLD: 2.61 RATIO — HIGH (ref 0.88–1.16)
IRON SATN MFR SERPL: 16 UG/DL — LOW (ref 45–165)
IRON SATN MFR SERPL: 7 % — LOW (ref 16–55)
KAPPA LC SER QL IFE: 2.35 MG/DL — HIGH (ref 0.33–1.94)
KAPPA/LAMBDA FREE LIGHT CHAIN RATIO, SERUM: 1.12 RATIO — SIGNIFICANT CHANGE UP (ref 0.26–1.65)
LAMBDA LC SER QL IFE: 2.09 MG/DL — SIGNIFICANT CHANGE UP (ref 0.57–2.63)
LDH SERPL L TO P-CCNC: 280 U/L — HIGH (ref 50–242)
MAGNESIUM SERPL-MCNC: 1.8 MG/DL — SIGNIFICANT CHANGE UP (ref 1.6–2.6)
MCHC RBC-ENTMCNC: 32.1 GM/DL — SIGNIFICANT CHANGE UP (ref 32–36)
MCHC RBC-ENTMCNC: 33.1 PG — SIGNIFICANT CHANGE UP (ref 27–34)
MCV RBC AUTO: 103.2 FL — HIGH (ref 80–100)
NRBC # BLD: 0 /100 WBCS — SIGNIFICANT CHANGE UP (ref 0–0)
PLATELET # BLD AUTO: 72 K/UL — LOW (ref 150–400)
POTASSIUM SERPL-MCNC: 4 MMOL/L — SIGNIFICANT CHANGE UP (ref 3.5–5.3)
POTASSIUM SERPL-SCNC: 4 MMOL/L — SIGNIFICANT CHANGE UP (ref 3.5–5.3)
PROT SERPL-MCNC: 6.3 G/DL — SIGNIFICANT CHANGE UP (ref 6–8.3)
PROT SERPL-MCNC: 6.3 G/DL — SIGNIFICANT CHANGE UP (ref 6–8.3)
PROTHROM AB SERPL-ACNC: 30.6 SEC — HIGH (ref 10.5–13.4)
RBC # BLD: 3.14 M/UL — LOW (ref 4.2–5.8)
RBC # FLD: 14.2 % — SIGNIFICANT CHANGE UP (ref 10.3–14.5)
SODIUM SERPL-SCNC: 138 MMOL/L — SIGNIFICANT CHANGE UP (ref 135–145)
SPECIMEN SOURCE: SIGNIFICANT CHANGE UP
TIBC SERPL-MCNC: 235 UG/DL — SIGNIFICANT CHANGE UP (ref 220–430)
UIBC SERPL-MCNC: 218 UG/DL — SIGNIFICANT CHANGE UP (ref 110–370)
VIT B12 SERPL-MCNC: 435 PG/ML — SIGNIFICANT CHANGE UP (ref 232–1245)
WBC # BLD: 6.07 K/UL — SIGNIFICANT CHANGE UP (ref 3.8–10.5)
WBC # FLD AUTO: 6.07 K/UL — SIGNIFICANT CHANGE UP (ref 3.8–10.5)

## 2022-04-26 PROCEDURE — 99233 SBSQ HOSP IP/OBS HIGH 50: CPT

## 2022-04-26 PROCEDURE — 93970 EXTREMITY STUDY: CPT | Mod: 26

## 2022-04-26 PROCEDURE — 99232 SBSQ HOSP IP/OBS MODERATE 35: CPT

## 2022-04-26 RX ORDER — PREGABALIN 225 MG/1
2000 CAPSULE ORAL DAILY
Refills: 0 | Status: DISCONTINUED | OUTPATIENT
Start: 2022-04-26 | End: 2022-04-27

## 2022-04-26 RX ORDER — FUROSEMIDE 40 MG
40 TABLET ORAL DAILY
Refills: 0 | Status: DISCONTINUED | OUTPATIENT
Start: 2022-04-26 | End: 2022-04-27

## 2022-04-26 RX ORDER — WARFARIN SODIUM 2.5 MG/1
7.5 TABLET ORAL ONCE
Refills: 0 | Status: COMPLETED | OUTPATIENT
Start: 2022-04-26 | End: 2022-04-26

## 2022-04-26 RX ORDER — FERROUS SULFATE 325(65) MG
325 TABLET ORAL DAILY
Refills: 0 | Status: DISCONTINUED | OUTPATIENT
Start: 2022-04-26 | End: 2022-04-27

## 2022-04-26 RX ADMIN — PANTOPRAZOLE SODIUM 40 MILLIGRAM(S): 20 TABLET, DELAYED RELEASE ORAL at 05:31

## 2022-04-26 RX ADMIN — Medication 1000 UNIT(S): at 11:47

## 2022-04-26 RX ADMIN — WARFARIN SODIUM 5 MILLIGRAM(S): 2.5 TABLET ORAL at 00:56

## 2022-04-26 RX ADMIN — Medication 100 MILLIGRAM(S): at 15:40

## 2022-04-26 RX ADMIN — TIOTROPIUM BROMIDE 1 CAPSULE(S): 18 CAPSULE ORAL; RESPIRATORY (INHALATION) at 08:49

## 2022-04-26 RX ADMIN — CEFTRIAXONE 100 MILLIGRAM(S): 500 INJECTION, POWDER, FOR SOLUTION INTRAMUSCULAR; INTRAVENOUS at 01:22

## 2022-04-26 RX ADMIN — Medication 100 MILLIGRAM(S): at 05:31

## 2022-04-26 RX ADMIN — WARFARIN SODIUM 7.5 MILLIGRAM(S): 2.5 TABLET ORAL at 20:34

## 2022-04-26 RX ADMIN — BUDESONIDE AND FORMOTEROL FUMARATE DIHYDRATE 2 PUFF(S): 160; 4.5 AEROSOL RESPIRATORY (INHALATION) at 21:24

## 2022-04-26 RX ADMIN — CARVEDILOL PHOSPHATE 25 MILLIGRAM(S): 80 CAPSULE, EXTENDED RELEASE ORAL at 05:31

## 2022-04-26 RX ADMIN — Medication 650 MILLIGRAM(S): at 20:34

## 2022-04-26 RX ADMIN — BUDESONIDE AND FORMOTEROL FUMARATE DIHYDRATE 2 PUFF(S): 160; 4.5 AEROSOL RESPIRATORY (INHALATION) at 08:48

## 2022-04-26 RX ADMIN — Medication 1 MILLIGRAM(S): at 11:47

## 2022-04-26 RX ADMIN — Medication 40 MILLIGRAM(S): at 10:56

## 2022-04-26 RX ADMIN — Medication 100 MICROGRAM(S): at 05:31

## 2022-04-26 RX ADMIN — Medication 650 MILLIGRAM(S): at 21:34

## 2022-04-26 RX ADMIN — CARVEDILOL PHOSPHATE 25 MILLIGRAM(S): 80 CAPSULE, EXTENDED RELEASE ORAL at 18:32

## 2022-04-26 NOTE — PROGRESS NOTE ADULT - SUBJECTIVE AND OBJECTIVE BOX
Patient feeling better, no acute complaints.  Was taken off of antibiotics. No clear signs of infection noted.      MEDICATIONS  (STANDING):  budesonide  80 MICROgram(s)/formoterol 4.5 MICROgram(s) Inhaler 2 Puff(s) Inhalation two times a day  carvedilol 25 milliGRAM(s) Oral every 12 hours  cholecalciferol 1000 Unit(s) Oral daily  folic acid 1 milliGRAM(s) Oral daily  furosemide    Tablet 40 milliGRAM(s) Oral daily  levothyroxine 100 MICROGram(s) Oral daily  pantoprazole    Tablet 40 milliGRAM(s) Oral before breakfast  tiotropium 18 MICROgram(s) Capsule 1 Capsule(s) Inhalation daily  warfarin 7.5 milliGRAM(s) Oral once    MEDICATIONS  (PRN):  acetaminophen     Tablet .. 650 milliGRAM(s) Oral every 6 hours PRN Temp greater or equal to 38C (100.4F), Mild Pain (1 - 3)  benzocaine 15 mG/menthol 3.6 mG Lozenge 1 Lozenge Oral five times a day PRN Sore Throat      04-26    138  |  103  |  26<H>  ----------------------------<  112<H>  4.0   |  26  |  1.31<H>    Ca    8.1<L>      26 Apr 2022 06:05  Mg     1.8     04-26    TPro  6.3  /  Alb  x   /  TBili  x   /  DBili  x   /  AST  x   /  ALT  x   /  AlkPhos  x   04-26                          10.4   6.07  )-----------( 72       ( 26 Apr 2022 06:05 )             32.4     Vital Signs Last 24 Hrs  T(C): 36.7 (26 Apr 2022 16:15), Max: 37 (25 Apr 2022 20:35)  T(F): 98 (26 Apr 2022 16:15), Max: 98.6 (25 Apr 2022 20:35)  HR: 60 (26 Apr 2022 16:15) (57 - 62)  BP: 117/58 (26 Apr 2022 16:15) (99/60 - 126/60)  BP(mean): 66 (26 Apr 2022 05:00) (66 - 79)  RR: 19 (26 Apr 2022 16:15) (17 - 20)  SpO2: 94% (26 Apr 2022 16:15) (94% - 96%)

## 2022-04-26 NOTE — PROGRESS NOTE ADULT - SUBJECTIVE AND OBJECTIVE BOX
CC: f/u for fever    Patient reports: he is comfortable this AM.He reports improved back oain.No fever or GI symptoms    REVIEW OF SYSTEMS:  All other review of systems negative (Comprehensive ROS)    Antimicrobials Day #  :day 2-3  cefTRIAXone   IVPB 1000 milliGRAM(s) IV Intermittent every 24 hours  metroNIDAZOLE  IVPB 500 milliGRAM(s) IV Intermittent every 8 hours    Other Medications Reviewed  MEDICATIONS  (STANDING):  budesonide  80 MICROgram(s)/formoterol 4.5 MICROgram(s) Inhaler 2 Puff(s) Inhalation two times a day  carvedilol 25 milliGRAM(s) Oral every 12 hours  cefTRIAXone   IVPB 1000 milliGRAM(s) IV Intermittent every 24 hours  cholecalciferol 1000 Unit(s) Oral daily  folic acid 1 milliGRAM(s) Oral daily  furosemide    Tablet 40 milliGRAM(s) Oral daily  levothyroxine 100 MICROGram(s) Oral daily  metroNIDAZOLE  IVPB 500 milliGRAM(s) IV Intermittent every 8 hours  pantoprazole    Tablet 40 milliGRAM(s) Oral before breakfast  tiotropium 18 MICROgram(s) Capsule 1 Capsule(s) Inhalation daily  warfarin 7.5 milliGRAM(s) Oral once    T(F): 98.4 (22 @ 09:03), Max: 98.6 (22 @ 20:35)  HR: 57 (22 @ 12:54)  BP: 126/60 (22 @ 12:54)  RR: 17 (22 @ 12:54)  SpO2: 94% (22 @ 12:54)  Wt(kg): --    PHYSICAL EXAM:  General: alert, no acute distress  Eyes:  anicteric, no conjunctival injection, no discharge  Oropharynx: no lesions or injection 	  Neck: supple, without adenopathy  Lungs: clear to auscultation  Heart: irregular rate and rhythm; no murmur,  Abdomen: soft, nondistended, nontender, without mass or organomegaly  Skin: no lesions  Extremities: no clubbing, cyanosis, + edema  Neurologic: alert, oriented, moves all extremities    LAB RESULTS:                        10.4   6.07  )-----------( 72       ( 2022 06:05 )             32.4         138  |  103  |  26<H>  ----------------------------<  112<H>  4.0   |  26  |  1.31<H>    Ca    8.1<L>      2022 06:05  Mg     1.8         TPro  6.3  /  Alb  x   /  TBili  x   /  DBili  x   /  AST  x   /  ALT  x   /  AlkPhos  x       LIVER FUNCTIONS - ( 2022 06:10 )  Alb: x     / Pro: 6.3 g/dL / ALK PHOS: x     / ALT: x     / AST: x     / GGT: x           Urinalysis Basic - ( 2022 21:25 )    Color: Yellow / Appearance: Clear / S.020 / pH: x  Gluc: x / Ketone: Negative  / Bili: Negative / Urobili: Negative   Blood: x / Protein: 30 mg/dL / Nitrite: Negative   Leuk Esterase: Negative / RBC: 0-4 /HPF / WBC 0-2 /HPF   Sq Epi: x / Non Sq Epi: Neg.-Few / Bacteria: Trace /HPF      MICROBIOLOGY:  RECENT CULTURES:   @ 21:25 Clean Catch Clean Catch (Midstream)     No growth       @ 21:10 .Blood Blood-Peripheral     No growth to date.          RADIOLOGY REVIEWED:  < from: CT Lumbar Spine No Cont (22 @ 11:27) >  *** ADDENDUM***    COMPARISON is made to prior CT scan dated 2022. The irregular   endplate changes at L2-3 and L3-4 are unchanged from prior examination   and likely degenerative in nature.    < end of copied text >

## 2022-04-26 NOTE — PROGRESS NOTE ADULT - ASSESSMENT
87 year old obese man with past medical history of Coronary artery disease (history of myocardial infarction s/p PCI), history of mesenteric ischemia with stent to SMA, possible iliac artery disease, Hypertension, Atrial fibrillation (on warfarin), HFrEF (LVEF 46% in 2021) s/p ICD, CKD, CJ & thrombocytopenia who presented with fever, chills and diarrhea. CT shows potential discitis    #Severe sepsis - present on admission - likely source ?discitis ?diarrhea   -Meets severe sepsis due to fever 102.7, RR 21. Lactate >2.0 and RUFINO  -CT Lumbar Spine: Endplate erosions at this inferior endplate of L2 and superior endplate of L3 with paravertebral soft tissue suspicious for discitis.   -Continue IV Flagyl and Rocephin (Day 1)    -ID consulted and believes pt should f/u with heme/onc as he may have a possible MDS, advised for abx another 24hrs but to hold them if blood cx are negative. According to pts wife, pt CANNOT have an MRI with his ICD  -Heme/onc consulted patient known to their service at [facility], had a mild anemia with a macrocytosis for a couple of years now. Baseline Hg irqfrtv56.5g/dl, platelets in the 120's to 130's -105FL. 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  Advised to check B12, folic acid, and iron rule out nutritional deficiencies  Check Haptoglobin LDH rule out hemolysis.      -Follow up blood cultures x 2, no growth to date  -Continue Tylenol PRN for fever  -Bladder scan and straight cath for ?urinary retention   -Check stool for cultures, GI PCR Panel.   -Advance diet to PO diet      #RUFINO on CKD3   -Unknown baseline   -Held Lasix and ramipril for RUFINO  -Cr improving, now 1.27  -Avoid nephrotoxic medications  -Follow up AM BMP    #Hypomagnesemia  -Replete  -Repeat in AM    #Macrocytic anemia  -Folic acid supplement  -No overt signs of bleeding  -Follow up routine CBC    #Thrombocytopenia   -Chronic, per chart review  -Continue to monitor CBC    #HTN  -Cont coreg with hold parameters  -Continue to hold ACEi for now for RUFINO   -Monitor vitals    #CHFrEF  #Chronic atrial fibrillation  #s/p ICD  -No signs of decompensated heart failure on physical exam  -Cont coreg with hold parameters for rate control  -Continue coumadin with daily INR. Monitor for toxicity. Goal INR 2-3  -Coumadin 5mg daily (hold dose 7.5mg daily)  -Continue to hold ACEi and lasix for now for RUFINO   -Follow up TTE  - Cardiology consulted Dr Vidal, recommendations appreciated.    #hx of mesenteric ischemia with SMA stent x 2  - Continue coumadin    #Hypothyroidism  -Cont Levothyroxine 100 (confirmed by pharmacy)  - TSH normal    #DVT/GI proph  - Coumadin  - PPI    Dispo: Pending above.       Discussed with wife Ceci Burciaga [phone].      87 year old obese man with past medical history of Coronary artery disease (history of myocardial infarction s/p PCI), history of mesenteric ischemia with stent to SMA, possible iliac artery disease, Hypertension, Atrial fibrillation (on warfarin), HFrEF (LVEF 46% in 2021) s/p ICD, CKD, CJ & thrombocytopenia who presented with fever, chills and diarrhea. CT shows potential discitis    #Severe sepsis - present on admission (fever 102.7, RR 21. Lactate >2.0 and RUFINO) - unclear source. ?discitis ?diarrhea   -CT of A/P without acute pathology, CT of LS spine with concerns raised of discitis of L2-L3.  -ID consulted - low suspicion for primary GI illness, possible discitis, may need IR disc aspirate, recommended MRI if possible. Advised that patient likely has a MDS   -Unable to have an MRI due to ICD   -Will f/u with ID regarding further imaging (CT w/contrast) or need for ortho/neurosurg eval?   -Continue IV Flagyl and Rocephin (Day 2) for now pending blood cultures   -Blood cultures with NGTD  -Pt complaining of sore throat, ordered throat culture to r/o anything infectious  -Bladder scan q6h for next 24hrs and straight cath if needed for urinary retention   -Check stool for cultures, GI PCR Panel if diarrhea resumes (no diarrhea since Sunday)  -ID and hematology following    #R/O MDS  -Heme/onc consulted patient known to their service at [facility], had a mild anemia with a macrocytosis for a couple of years now. Baseline Hg aaeonii89.5g/dl, platelets in the 120's to 130's -105FL.   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  -Advised to check B12, folic acid, and iron rule out nutritional deficiencies  -Iron studies reviewed, f/u with heme  -Haptoglobin w/in normal range r/o hemolysis     #RUFINO on CKD3   -Unknown baseline   -Holding Lasix and ramipril for RUFINO  -Cr now 1.31 worse than 1.27 yesterday   -Avoid nephrotoxic medications  -Follow up AM BMP    #Hypomagnesemia  -Supplemented yesterday   -f/u AM labs from today    #Macrocytic anemia  -Folic acid supplement  -No overt signs of bleeding  -Follow up routine CBC    #Thrombocytopenia   -Chronic, per chart review  -Continue to monitor CBC    #HTN  -Cont coreg with hold parameters  -Continue to hold ACEi for now for RUFINO   -Monitor vitals    #CHFrEF  #Chronic atrial fibrillation  #s/p ICD  -ICD not compatible with MRI  -Cont coreg with hold parameters for rate control  -Continue coumadin with daily INR. Monitor for toxicity. Goal INR 2-3  -Coumadin 5mg daily (hold dose 7.5mg daily)  -Continue to hold ACEi and lasix for now for RUFINO   -TTE consistent with normal LVEF and signs of elevated filling pressures.  -B/L LE doppler ordered  - Cardiology consulted Dr Vidal, recommendations appreciated.    #hx of mesenteric ischemia with SMA stent x 2  - Continue coumadin    #Hypothyroidism  -Cont Levothyroxine 100 (confirmed by pharmacy)  - TSH normal    #DVT/GI proph  - Coumadin  - PPI    Dispo: Pending ID and hematology follow up.  PT consult pending      Discussed with wife Ceci Burciaga [phone].      87 year old obese man with past medical history of Coronary artery disease (history of myocardial infarction s/p PCI), history of mesenteric ischemia with stent to SMA, possible iliac artery disease, Hypertension, Atrial fibrillation (on warfarin), HFrEF (LVEF 46% in 2021) s/p ICD, CKD, CJ & thrombocytopenia who presented with fever, chills and diarrhea. CT shows potential discitis    #Severe sepsis - present on admission (fever 102.7, RR 21. Lactate >2.0 and RUFINO) - unclear source. ?discitis ?diarrhea   -CT of A/P without acute pathology, CT of LS spine with concerns raised of discitis of L2-L3.  -ID consulted - low suspicion for primary GI illness, possible discitis, may need IR disc aspirate, recommended MRI if possible. Advised that patient likely has a MDS   -Unable to have an MRI due to ICD   -Will f/u with ID regarding further imaging (CT w/contrast) or need for ortho/neurosurg eval?   -Continue IV Flagyl and Rocephin (Day 2) for now pending blood cultures   -Blood cultures with NGTD  -Pt complaining of sore throat, ordered throat culture to r/o anything infectious  -Bladder scan q6h for next 24hrs and straight cath if needed for urinary retention   -Check stool for cultures, GI PCR Panel if diarrhea resumes (no diarrhea since Sunday)  -ID and hematology following    #R/O MDS  -Heme/onc consulted patient known to their service at [facility], had a mild anemia with a macrocytosis for a couple of years now. Baseline Hg svohmdz58.5g/dl, platelets in the 120's to 130's -105FL.   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  -Advised to check B12, folic acid, and iron rule out nutritional deficiencies  -Iron studies reviewed, f/u with heme  -Haptoglobin w/in normal range r/o hemolysis     #RUFINO on CKD3   -Unknown baseline   -Holding Lasix and ramipril for RUFINO  -Cr now 1.31 worse than 1.27 yesterday   -Avoid nephrotoxic medications  -Follow up AM BMP    #Hypomagnesemia  -Supplemented yesterday   -f/u AM labs from today    #Macrocytic anemia  -Folic acid supplement  -No overt signs of bleeding  -Follow up routine CBC    #Thrombocytopenia   -Chronic, per chart review  -Continue to monitor CBC    #HTN  -Cont coreg with hold parameters  -Continue to hold ACEi for now for RUFINO   -Monitor vitals    #CHFrEF  #Chronic atrial fibrillation  #s/p ICD  -ICD not compatible with MRI  -Cont coreg with hold parameters for rate control  -Continue coumadin with daily INR. Monitor for toxicity. Goal INR 2-3  -Coumadin 5mg daily (hold dose 7.5mg daily)  -Continue to hold ACEi and lasix for now for RUFINO   -TTE consistent with normal LVEF and signs of elevated filling pressures.  -B/L LE doppler ordered  - Cardiology consulted Dr Vidal, recommendations appreciated.    #hx of mesenteric ischemia with SMA stent x 2  - Continue coumadin    #Hypothyroidism  -Cont Levothyroxine 100 (confirmed by pharmacy)  - TSH normal    #DVT/GI proph  - Coumadin  - PPI    Dispo: Pending ID and hematology follow up.  PT consult pending      Discussed with pts wife and son at bedside    Pts wife: Ceci Burciaga [phone].      87 year old obese man with past medical history of Coronary artery disease (history of myocardial infarction s/p PCI), history of mesenteric ischemia with stent to SMA, possible iliac artery disease, Hypertension, Atrial fibrillation (on warfarin), HFrEF (LVEF 46% in 2021) s/p ICD, CKD, CJ & thrombocytopenia who presented with fever, chills and diarrhea. CT shows potential discitis    #Severe sepsis - present on admission (fever 102.7, RR 21. Lactate >2.0 and RUFINO) - unclear source. ?discitis ?diarrhea   -CT of A/P without acute pathology, CT of LS spine with concerns raised of discitis of L2-L3.  -ID consulted - low suspicion for primary GI illness, possible discitis, may need IR disc aspirate, recommended MRI if possible. Advised that patient likely has a MDS   -Unable to have an MRI due to ICD   -Will f/u with ID regarding further imaging (CT w/contrast) or need for ortho/neurosurg eval?   -Continue IV Flagyl and Rocephin (Day 2) for now pending blood cultures   -Blood cultures with NGTD  -Pt complaining of sore throat, ordered throat culture to r/o anything infectious  -Bladder scan q6h for next 24hrs and straight cath if needed for urinary retention   -Check stool for cultures, GI PCR Panel if diarrhea resumes (no diarrhea since Sunday)  -ID and hematology following    #Possible MDS  -Heme/onc consulted patient known to their service at [facility], had a mild anemia with a macrocytosis for a couple of years now. Baseline Hg cxkkvcr91.5g/dl, platelets in the 120's to 130's -105FL.   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  -Advised to check B12, folic acid, and iron rule out nutritional deficiencies  -Iron studies reviewed, f/u with heme  -Haptoglobin w/in normal range r/o hemolysis     #RUFINO on CKD3   -Unknown baseline   -Will restart Lasix 40mg daily but hold ramipril for RUFINO  -Cr now 1.31 worse than 1.27 yesterday   -Avoid nephrotoxic medications  -Follow up AM BMP    #Hypomagnesemia  -Supplemented yesterday   -f/u AM labs from today    #Macrocytic anemia  -Folic acid supplement  -No overt signs of bleeding  -Follow up routine CBC    #Thrombocytopenia   -Chronic, per chart review  -Continue to monitor CBC    #HTN  -Cont coreg with hold parameters  -Continue to hold ACEi for now for RUFINO   -Monitor vitals    #CHFrEF  #Chronic atrial fibrillation on long term anticoagulation with coumadin, therapeutic INR  #s/p ICD  -ICD not compatible with MRI  -Cont coreg with hold parameters for rate control  -Continue coumadin with daily INR. Monitor for toxicity. Goal INR 2-3  -Coumadin 7.5mg daily  -Continue to hold ACEi; will restart lasix 40mg daily   -TTE consistent with normal LVEF and signs of elevated filling pressures.  -B/L LE doppler ordered  - Cardiology consulted Dr Vidal, recommendations appreciated.    #hx of mesenteric ischemia with SMA stent x 2  - Continue coumadin    #Hypothyroidism  -Cont Levothyroxine 100 (confirmed by pharmacy)  - TSH normal    #DVT/GI proph  - Coumadin  - PPI    Dispo: Pending ID and hematology follow up.  PT consult pending  Discussed with pts wife and son at bedside    Pts wife: Ceci Burciaga [phone].

## 2022-04-26 NOTE — PROGRESS NOTE ADULT - ASSESSMENT
Patient is a 87y male with a past history of CAD, A fib, DM, mesenteric ischemia with SMA stent, ICD/PPM, possible MDS, admitted with fever to 102.7, chills and diarrhea on 4/24.  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  He was feeling okay until yesterday when he developed severe chills and had to go inside.He than has diarrhea and became weak, came to ER where his fever was 102.7. CT of A/P without acute pathology, Ct of LS spine with concerns raised of discitis of L2-L3.This study was not compared with prior study 2 months ago .He denies any recent OPD infections or antibiotic use.He was started on CTX and metronidazole.He also received a dose of pip-tazobactam.  This is a difficult case in that while I normally would advise observing off antibiotics when there is a concern of discitis so that a biopsy can be obtained, If there are concerns of sepsis which existed on 4/24 on admission we have to initiate antibiotics.  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 I am not sure if diarrhea was a  nonspecific symptom of sepsis or he truly had a primary GI illness. Given negative CT scan of A/P, benign abdomin, and lack of diarrhea I tend to doubt a primary GI illness.   He has ongoing lower back pain, if CT findings are new c/w Feb 2022 than I would be concerned he has an infectious process. However, not clear if a spinal process accounting for fever to 102.7 on admission, as he has pain x months without any fever and usually discitis is an indolent infection unless related to an aggressive organism such as staph aureus.  Hi spine films have been reviewed with radiology and are being compared to Feb films. There is no change, probably degenerative in nature.  Hence, no need for additional w/u. of spine. He can not get an MRI secondary to ICD,  Suggest:  1.stop antibiotics-not clear what we are treating  2.observe, giving blood cultures additional time of incubation  3.No need to w/u further for siscitis  4 If he spikes a fever would send repeat blood cultures.

## 2022-04-26 NOTE — PROGRESS NOTE ADULT - SUBJECTIVE AND OBJECTIVE BOX
Patient is a 87y old  Male who presents with a chief complaint of chills and watery diarrhea diagnosed with sepsis, found to have possible discitis      Patient seen and examined at bedside. No overnight events reported. Pt is urinating and has not had an episode of diarrhea since  night.    ALLERGIES:  Lovenox (Other)    MEDICATIONS  (STANDING):  budesonide  80 MICROgram(s)/formoterol 4.5 MICROgram(s) Inhaler 2 Puff(s) Inhalation two times a day  carvedilol 25 milliGRAM(s) Oral every 12 hours  cefTRIAXone   IVPB 1000 milliGRAM(s) IV Intermittent every 24 hours  cholecalciferol 1000 Unit(s) Oral daily  folic acid 1 milliGRAM(s) Oral daily  levothyroxine 100 MICROGram(s) Oral daily  metroNIDAZOLE  IVPB 500 milliGRAM(s) IV Intermittent every 8 hours  pantoprazole    Tablet 40 milliGRAM(s) Oral before breakfast  tiotropium 18 MICROgram(s) Capsule 1 Capsule(s) Inhalation daily    MEDICATIONS  (PRN):  acetaminophen     Tablet .. 650 milliGRAM(s) Oral every 6 hours PRN Temp greater or equal to 38C (100.4F), Mild Pain (1 - 3)  benzocaine 15 mG/menthol 3.6 mG Lozenge 1 Lozenge Oral five times a day PRN Sore Throat    Vital Signs Last 24 Hrs  T(F): 98.6 (2022 05:00), Max: 98.6 (2022 20:35)  HR: 62 (2022 08:51) (60 - 67)  BP: 105/47 (2022 05:00) (99/60 - 114/62)  RR: 18 (2022 05:00) (17 - 20)  SpO2: 94% (2022 08:51) (94% - 99%)  I&O's Summary    2022 07:01  -  2022 07:00  --------------------------------------------------------  IN: 0 mL / OUT: 500 mL / NET: -500 mL      PHYSICAL EXAM:  General: NAD, A/O x 3  ENT: No gross hearing impairment, Moist mucous membranes, no thrush  Neck: Supple, No JVD  Lungs: minimal labored breathing, breath sounds diminished  Cardio: RRR, S1/S2, No murmur  Abdomen: Soft, Nontender, Nondistended; Bowel sounds present, Obese  Extremities: 3+ pitting edema in b/l lower extremities, No calf tenderness, No cyanosis,  Psych: Appropriate mood and affect    LABS:                        10.4   6.07  )-----------( 72       ( 2022 06:05 )             32.4         138  |  103  |  26  ----------------------------<  112  4.0   |  26  |  1.31    Ca    8.1      2022 06:05  Mg     1.5         TPro  6.4  /  Alb  3.2  /  TBili  0.6  /  DBili  x   /  AST  19  /  ALT  18  /  AlkPhos  53            PT/INR - ( 2022 05:25 )   PT: 30.6 sec;   INR: 2.61 ratio         PTT - ( 2022 21:10 )  PTT:35.6 sec  Lactate, Blood: 1.7 mmol/L ( @ 23:56)  Lactate, Blood: 2.2 mmol/L ( @ 21:10)    Procalcitonin, Serum: 0.29 ng/mL (22 @ 06:25)  Procalcitonin, Serum: 0.04 ng/mL (22 @ 21:10)    CARDIAC MARKERS ( 2022 06:25 )  x     / 67.2 ng/L / x     / x     / x      CARDIAC MARKERS ( 2022 23:10 )  x     / 70.1 ng/L / x     / x     / x      CARDIAC MARKERS ( 2022 21:10 )  x     / 37.1 ng/L / x     / x     / x            TSH 2.212   TSH with FT4 reflex --  Total T3 --                  Urinalysis Basic - ( 2022 21:25 )    Color: Yellow / Appearance: Clear / S.020 / pH: x  Gluc: x / Ketone: Negative  / Bili: Negative / Urobili: Negative   Blood: x / Protein: 30 mg/dL / Nitrite: Negative   Leuk Esterase: Negative / RBC: 0-4 /HPF / WBC 0-2 /HPF   Sq Epi: x / Non Sq Epi: Neg.-Few / Bacteria: Trace /HPF        Culture - Urine (collected 2022 21:25)  Source: Clean Catch Clean Catch (Midstream)  Final Report (2022 07:10):    No growth    Culture - Blood (collected 2022 21:10)  Source: .Blood Blood-Peripheral  Preliminary Report (2022 06:01):    No growth to date.    Culture - Blood (collected 2022 21:10)  Source: .Blood Blood-Peripheral  Preliminary Report (2022 06:01):    No growth to date.        RADIOLOGY & ADDITIONAL TESTS:    Care Discussed with Consultants/Other Providers:    Patient is a 87y old Male who presents with a chief complaint of chills and watery diarrhea diagnosed with sepsis, found to have possible discitis      Patient seen and examined at bedside. No overnight events reported. Pt is urinating and has not had an episode of diarrhea since  night. Pt is complaining of sore/irritated throat says its due to his inhaled medication.    ALLERGIES:  Lovenox (Other)    MEDICATIONS  (STANDING):  budesonide  80 MICROgram(s)/formoterol 4.5 MICROgram(s) Inhaler 2 Puff(s) Inhalation two times a day  carvedilol 25 milliGRAM(s) Oral every 12 hours  cefTRIAXone   IVPB 1000 milliGRAM(s) IV Intermittent every 24 hours  cholecalciferol 1000 Unit(s) Oral daily  folic acid 1 milliGRAM(s) Oral daily  levothyroxine 100 MICROGram(s) Oral daily  metroNIDAZOLE  IVPB 500 milliGRAM(s) IV Intermittent every 8 hours  pantoprazole    Tablet 40 milliGRAM(s) Oral before breakfast  tiotropium 18 MICROgram(s) Capsule 1 Capsule(s) Inhalation daily    MEDICATIONS  (PRN):  acetaminophen     Tablet .. 650 milliGRAM(s) Oral every 6 hours PRN Temp greater or equal to 38C (100.4F), Mild Pain (1 - 3)  benzocaine 15 mG/menthol 3.6 mG Lozenge 1 Lozenge Oral five times a day PRN Sore Throat    Vital Signs Last 24 Hrs  T(F): 98.6 (2022 05:00), Max: 98.6 (2022 20:35)  HR: 62 (2022 08:51) (60 - 67)  BP: 105/47 (2022 05:00) (99/60 - 114/62)  RR: 18 (2022 05:00) (17 - 20)  SpO2: 94% (2022 08:51) (94% - 99%)  I&O's Summary    2022 07:01  -  2022 07:00  --------------------------------------------------------  IN: 0 mL / OUT: 500 mL / NET: -500 mL      PHYSICAL EXAM:  General: NAD, A/O x 3  ENT: No gross hearing impairment, Moist mucous membranes, no thrush  Neck: Supple, No JVD  Lungs: minimal labored breathing, breath sounds diminished  Cardio: RRR, S1/S2, No murmur  Abdomen: Soft, Nontender, Nondistended; Bowel sounds present, Obese  Extremities: 3+ pitting edema in b/l lower extremities, No calf tenderness, No cyanosis,  Psych: Appropriate mood and affect    LABS:                        10.4   6.07  )-----------( 72       ( 2022 06:05 )             32.4         138  |  103  |  26  ----------------------------<  112  4.0   |  26  |  1.31    Ca    8.1      2022 06:05  Mg     1.5         TPro  6.4  /  Alb  3.2  /  TBili  0.6  /  DBili  x   /  AST  19  /  ALT  18  /  AlkPhos  53            PT/INR - ( 2022 05:25 )   PT: 30.6 sec;   INR: 2.61 ratio         PTT - ( 2022 21:10 )  PTT:35.6 sec  Lactate, Blood: 1.7 mmol/L ( @ 23:56)  Lactate, Blood: 2.2 mmol/L ( @ 21:10)    Procalcitonin, Serum: 0.29 ng/mL (22 @ 06:25)  Procalcitonin, Serum: 0.04 ng/mL (22 @ 21:10)    CARDIAC MARKERS ( 2022 06:25 )  x     / 67.2 ng/L / x     / x     / x      CARDIAC MARKERS ( 2022 23:10 )  x     / 70.1 ng/L / x     / x     / x      CARDIAC MARKERS ( 2022 21:10 )  x     / 37.1 ng/L / x     / x     / x            TSH 2.212   TSH with FT4 reflex --  Total T3 --                  Urinalysis Basic - ( 2022 21:25 )    Color: Yellow / Appearance: Clear / S.020 / pH: x  Gluc: x / Ketone: Negative  / Bili: Negative / Urobili: Negative   Blood: x / Protein: 30 mg/dL / Nitrite: Negative   Leuk Esterase: Negative / RBC: 0-4 /HPF / WBC 0-2 /HPF   Sq Epi: x / Non Sq Epi: Neg.-Few / Bacteria: Trace /HPF        Culture - Urine (collected 2022 21:25)  Source: Clean Catch Clean Catch (Midstream)  Final Report (2022 07:10):    No growth    Culture - Blood (collected 2022 21:10)  Source: .Blood Blood-Peripheral  Preliminary Report (2022 06:01):    No growth to date.    Culture - Blood (collected 2022 21:10)  Source: .Blood Blood-Peripheral  Preliminary Report (2022 06:01):    No growth to date.        RADIOLOGY & ADDITIONAL TESTS:    Care Discussed with Consultants/Other Providers:    Patient is a 87y old Male who presents with a chief complaint of chills and watery diarrhea diagnosed with sepsis, found to have possible discitis      Patient seen and examined at bedside. No overnight events reported. Pt is urinating and has not had an episode of diarrhea since  night. Pt is complaining of sore/irritated throat says its due to his inhaled medication.    ALLERGIES:  Lovenox (Other)    MEDICATIONS  (STANDING):  budesonide  80 MICROgram(s)/formoterol 4.5 MICROgram(s) Inhaler 2 Puff(s) Inhalation two times a day  carvedilol 25 milliGRAM(s) Oral every 12 hours  cefTRIAXone   IVPB 1000 milliGRAM(s) IV Intermittent every 24 hours  cholecalciferol 1000 Unit(s) Oral daily  folic acid 1 milliGRAM(s) Oral daily  levothyroxine 100 MICROGram(s) Oral daily  metroNIDAZOLE  IVPB 500 milliGRAM(s) IV Intermittent every 8 hours  pantoprazole    Tablet 40 milliGRAM(s) Oral before breakfast  tiotropium 18 MICROgram(s) Capsule 1 Capsule(s) Inhalation daily    MEDICATIONS  (PRN):  acetaminophen     Tablet .. 650 milliGRAM(s) Oral every 6 hours PRN Temp greater or equal to 38C (100.4F), Mild Pain (1 - 3)  benzocaine 15 mG/menthol 3.6 mG Lozenge 1 Lozenge Oral five times a day PRN Sore Throat    Vital Signs Last 24 Hrs  T(F): 98.6 (2022 05:00), Max: 98.6 (2022 20:35)  HR: 62 (2022 08:51) (60 - 67)  BP: 105/47 (2022 05:00) (99/60 - 114/62)  RR: 18 (2022 05:00) (17 - 20)  SpO2: 94% (2022 08:51) (94% - 99%)  I&O's Summary    2022 07:01  -  2022 07:00  --------------------------------------------------------  IN: 0 mL / OUT: 500 mL / NET: -500 mL      PHYSICAL EXAM:  General: NAD, A/O x 3  ENT: No gross hearing impairment, Moist mucous membranes, no thrush  Neck: Supple, No JVD  Lungs: minimal labored breathing, breath sounds diminished  Cardio: RRR, S1/S2, No murmur  Abdomen: Soft, Nontender, Nondistended; Bowel sounds present, Obese  Extremities: 3+ pitting edema in b/l lower extremities, No calf tenderness, No cyanosis,  Psych: Appropriate mood and affect    LABS:                        10.4   6.07  )-----------( 72       ( 2022 06:05 )             32.4         138  |  103  |  26  ----------------------------<  112  4.0   |  26  |  1.31    Ca    8.1      2022 06:05  Mg     1.5         TPro  6.4  /  Alb  3.2  /  TBili  0.6  /  DBili  x   /  AST  19  /  ALT  18  /  AlkPhos  53      PT/INR - ( 2022 05:25 )   PT: 30.6 sec;   INR: 2.61 ratio         PTT - ( 2022 21:10 )  PTT:35.6 sec  Lactate, Blood: 1.7 mmol/L ( @ 23:56)  Lactate, Blood: 2.2 mmol/L ( @ 21:10)    Procalcitonin, Serum: 0.29 ng/mL (22 @ 06:25)  Procalcitonin, Serum: 0.04 ng/mL (22 @ 21:10)    CARDIAC MARKERS ( 2022 06:25 )  x     / 67.2 ng/L / x     / x     / x      CARDIAC MARKERS ( 2022 23:10 )  x     / 70.1 ng/L / x     / x     / x      CARDIAC MARKERS ( 2022 21:10 )  x     / 37.1 ng/L / x     / x     / x        TSH 2.212   TSH with FT4 reflex --  Total T3 --    Urinalysis Basic - ( 2022 21:25 )    Color: Yellow / Appearance: Clear / S.020 / pH: x  Gluc: x / Ketone: Negative  / Bili: Negative / Urobili: Negative   Blood: x / Protein: 30 mg/dL / Nitrite: Negative   Leuk Esterase: Negative / RBC: 0-4 /HPF / WBC 0-2 /HPF   Sq Epi: x / Non Sq Epi: Neg.-Few / Bacteria: Trace /HPF        Culture - Urine (collected 2022 21:25)  Source: Clean Catch Clean Catch (Midstream)  Final Report (2022 07:10):    No growth    Culture - Blood (collected 2022 21:10)  Source: .Blood Blood-Peripheral  Preliminary Report (2022 06:01):    No growth to date.    Culture - Blood (collected 2022 21:10)  Source: .Blood Blood-Peripheral  Preliminary Report (2022 06:01):    No growth to date.        RADIOLOGY & ADDITIONAL TESTS:    Care Discussed with Consultants/Other Providers:

## 2022-04-26 NOTE — DIETITIAN INITIAL EVALUATION ADULT - PERTINENT MEDS FT
MEDICATIONS  (STANDING):  budesonide  80 MICROgram(s)/formoterol 4.5 MICROgram(s) Inhaler 2 Puff(s) Inhalation two times a day  carvedilol 25 milliGRAM(s) Oral every 12 hours  cefTRIAXone   IVPB 1000 milliGRAM(s) IV Intermittent every 24 hours  cholecalciferol 1000 Unit(s) Oral daily  folic acid 1 milliGRAM(s) Oral daily  furosemide    Tablet 40 milliGRAM(s) Oral daily  levothyroxine 100 MICROGram(s) Oral daily  metroNIDAZOLE  IVPB 500 milliGRAM(s) IV Intermittent every 8 hours  pantoprazole    Tablet 40 milliGRAM(s) Oral before breakfast  tiotropium 18 MICROgram(s) Capsule 1 Capsule(s) Inhalation daily  warfarin 7.5 milliGRAM(s) Oral once    MEDICATIONS  (PRN):  acetaminophen     Tablet .. 650 milliGRAM(s) Oral every 6 hours PRN Temp greater or equal to 38C (100.4F), Mild Pain (1 - 3)  benzocaine 15 mG/menthol 3.6 mG Lozenge 1 Lozenge Oral five times a day PRN Sore Throat

## 2022-04-26 NOTE — PROGRESS NOTE ADULT - ASSESSMENT
This is an 87 year old man with a history fo hypertension, hyperlipidemia, mesenteric ischemia, with SMA stents, iliac artery aneurysm, CHF, AICD, hypothyroidism, CJ. Patient presents with sepsis, unknown source, watery diarrhea for a couple of days, chills.  Patient noted to be more anemic on hospital day 2.  Hg 10.2g/dl, Platelets 72k/ul, [id number].  Patient known to our service at [facility], had a mild anemia with a macrocytosis for a couple of years now Baseline Hg roughy 11.5g/dl, platelets in the 120's to 130's -105FL. 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  Painted has been followed periodically by Dr. Aarti Langston and Dr. Tiffanie Gonzalez over the years.  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  Now counts are slightly lower at present time given current febrile episode.  This is still most likely to be from acute process on top of a chronic anemia.   The workup did show a normal ferritin ~200 but % sat 11% which is typical of iron deficiency  on top of anemia of chronic inflammatory disease reccomend starting an iron tablet prefferably ferrous gluconate 38mg for better tolerance at home. B12 borderline at ~400 recommend increasing B12 to 2000mcg, which can still be absorbed by osmosis even if GI tract is not optimal such as in pernicious anemia.      Haptoglobin normal at 166, LDH normal at 200.  Not consistent with hemolysis    There is still an underling early MDS, or clonal hematopoesis causing the baseline counts to be modestly low, and the MCV to be high but this is unlikely to be the predominant factor causing the current presentation. Patient will follow up with Dr Hess with this.      The CBC today was not at all that severely suppressed Hg 10.4g/dl and platelets 72,000 is stable for discharge form hematologic standpoint.

## 2022-04-26 NOTE — DIETITIAN INITIAL EVALUATION ADULT - ORAL INTAKE PTA/DIET HISTORY
Patient reports consuming a regular diet PTA , NKFA noted , no recent weight changes , encouraged diet compliance & encourage portion control to promote weight loss.

## 2022-04-26 NOTE — PHYSICAL THERAPY INITIAL EVALUATION ADULT - ADDITIONAL COMMENTS
pt lives w/wife in private house 3 [address] w/2 rails, 1 flight in house which pt does not use. pt uses radha or rw to ambulate, independent w/most ADL's, drives locally infrequently

## 2022-04-26 NOTE — DIETITIAN INITIAL EVALUATION ADULT - PERTINENT LABORATORY DATA
04-26    138  |  103  |  26<H>  ----------------------------<  112<H>  4.0   |  26  |  1.31<H>    Ca    8.1<L>      26 Apr 2022 06:05  Mg     1.8     04-26    TPro  6.3  /  Alb  x   /  TBili  x   /  DBili  x   /  AST  x   /  ALT  x   /  AlkPhos  x   04-26

## 2022-04-26 NOTE — DIETITIAN INITIAL EVALUATION ADULT - OTHER INFO
7 year old obese man with past medical history of Coronary artery disease (history of myocardial infarction s/p PCI), history of mesenteric ischemia with stent to SMA, possible iliac artery disease, Hypertension, Atrial fibrillation (on warfarin), HFrEF (LVEF 46% in 2021) s/p ICD, CKD, CJ & thrombocytopenia who presented with fever, chills and diarrhea. CT shows potential discitis. Patient reports tolerating die with good appetite , consumed 100% of meals. Patient reports issue with cool liquids , no coughing or choking reported , plans to see ENT .

## 2022-04-26 NOTE — PROGRESS NOTE ADULT - SUBJECTIVE AND OBJECTIVE BOX
MELISSA BRWOER  [id number]      Chief Complaint: Fever/Chills/Diarrhea/CHF/AF    Interval History: The patient reports breathing is ok today, has shortness of breath only on exertion.     Tele: V paced, NSVT      Current meds:   acetaminophen     Tablet .. 650 milliGRAM(s) Oral every 6 hours PRN  benzocaine 15 mG/menthol 3.6 mG Lozenge 1 Lozenge Oral five times a day PRN  budesonide  80 MICROgram(s)/formoterol 4.5 MICROgram(s) Inhaler 2 Puff(s) Inhalation two times a day  carvedilol 25 milliGRAM(s) Oral every 12 hours  cefTRIAXone   IVPB 1000 milliGRAM(s) IV Intermittent every 24 hours  cholecalciferol 1000 Unit(s) Oral daily  folic acid 1 milliGRAM(s) Oral daily  levothyroxine 100 MICROGram(s) Oral daily  metroNIDAZOLE  IVPB 500 milliGRAM(s) IV Intermittent every 8 hours  pantoprazole    Tablet 40 milliGRAM(s) Oral before breakfast  tiotropium 18 MICROgram(s) Capsule 1 Capsule(s) Inhalation daily      Objective:     Vital Signs:  T(C): 36.9 (04-26-22 @ 09:03), Max: 37 (04-25-22 @ 20:35)  HR: 61 (04-26-22 @ 09:03) (60 - 67)  BP: 115/59 (04-26-22 @ 09:03) (99/60 - 115/59)  RR: 18 (04-26-22 @ 09:03) (17 - 20)  SpO2: 95% (04-26-22 @ 09:03) (94% - 99%)  Wt(kg): --    PHYSICAL EXAM:  General: obese, elderly man, no acute distress  HEENT: sclera anicteric  Neck: supple  CVS: JVP ~ 7 cm H20, RRR, s1, s2, no murmurs/rubs  Chest: unlabored respirations, decreased anterior breath sounds  Abdomen: obese  Extremities: chronic, non-pitting lower extremity edema b/l  Neuro: awake, alert & oriented x 3  Psych: normal affect    Labs:   26 Apr 2022 06:05    138    |  103    |  26     ----------------------------<  112    4.0     |  26     |  1.31     Ca    8.1        26 Apr 2022 06:05  Mg     1.5       25 Apr 2022 06:25    TPro  6.4    /  Alb  3.2    /  TBili  0.6    /  DBili  x      /  AST  19     /  ALT  18     /  AlkPhos  53     25 Apr 2022 06:25                          10.4   6.07  )-----------( 72       ( 26 Apr 2022 06:05 )             32.4     PT/INR - ( 26 Apr 2022 05:25 )   PT: 30.6 sec;   INR: 2.61 ratio         PTT - ( 24 Apr 2022 21:10 )  PTT:35.6 sec        Outpatient TTE (4/2021):  LVEF 46%  Normal RV size and function  Mild AR  Moderate pulmonary hypertension  No pericardial effusion     TTE (4/25/22);   1. Technically difficult study with poor endocardial visualization.   2. Normal global left ventricular systolic function.   3. Left ventricular ejection fraction, by visual estimation, is 55 to 60%.   4. Moderately increased LV wall thickness.   5. Normal left ventricular internal cavity size.   6. The right ventricle is not well visualized, appears mildly enlarged   with normal systolic function.   7. Left atrial enlargement.   8. Right atrial enlargement.   9. Trace mitral valve regurgitation.  10. Moderate tricuspid regurgitation.  11. Mild aortic valve leaflet calcification.  12. Top normal aorta at the Sinuses of Valsalva (3.6 cm).  13. Estimated pulmonary artery systolic pressure is 62.3 mmHg assuming a right atrial pressure of 15 mmHg, which is consistent with severe pulmonary hypertension.  14. The pericardium was not well visualized.    ECG (4/24/22); ventricular paced    CT Abdomen/Pelvis (4/24/22);  Although evaluation of bowel is limited without oral and intravenous   contrast, no definite CT evidence for colitis is suggested.  No bowel obstruction, significant ascites or free air.  Nonspecific mild stranding in the retroperitoneum and mesentery. Question infectious/inflammatory in etiology.

## 2022-04-26 NOTE — PROGRESS NOTE ADULT - ASSESSMENT
Assessment:  Jj Burciaga is an 87 year old obese man with past medical history of Coronary artery disease (history of myocardial infarction s/p PCI), history of mesenteric ischemia with stent to SMA, possible iliac artery disease, Hypertension, Atrial fibrillation (on warfarin), HFrEF (LVEF 46% in 2021) s/p ICD, CKD, CJ & thrombocytopenia who presented with fever, chills and diarrhea.     Cardiology consulted to evaluate for CHF. ECG consistent with ventricular paced rhythm. Troponins at upper limits of normal. Presentation does not appear to be primarily an acute coronary syndrome. Pro BNP elevated, echocardiogram consistent with normal LVEF and signs of elevated filling pressures.    Recommendations:  [] Fever, chills, diarrhea: Follow up ID workup  [] HFpEF: Signs of elevated filling pressures, would resume home Lasix 40 mg PO daily if BP allows. May continue home Carvedilol 25 mg BID pending BP. Hold ACE-I due to elevated Cr on presentation. Will follow up if device is MRI compatible   [] Atrial fibrillation: Continue home coumadin    We will continue to follow along.    Flor Vidal MD  Cardiology

## 2022-04-27 ENCOUNTER — TRANSCRIPTION ENCOUNTER (OUTPATIENT)
Age: 87
End: 2022-04-27

## 2022-04-27 ENCOUNTER — APPOINTMENT (OUTPATIENT)
Dept: CARDIOLOGY | Facility: CLINIC | Age: 87
End: 2022-04-27

## 2022-04-27 VITALS
SYSTOLIC BLOOD PRESSURE: 122 MMHG | TEMPERATURE: 98 F | RESPIRATION RATE: 16 BRPM | HEART RATE: 60 BPM | OXYGEN SATURATION: 96 % | DIASTOLIC BLOOD PRESSURE: 61 MMHG

## 2022-04-27 LAB
ANION GAP SERPL CALC-SCNC: 6 MMOL/L — SIGNIFICANT CHANGE UP (ref 5–17)
BUN SERPL-MCNC: 26 MG/DL — HIGH (ref 7–23)
CALCIUM SERPL-MCNC: 8.6 MG/DL — SIGNIFICANT CHANGE UP (ref 8.4–10.5)
CHLORIDE SERPL-SCNC: 107 MMOL/L — SIGNIFICANT CHANGE UP (ref 96–108)
CO2 SERPL-SCNC: 29 MMOL/L — SIGNIFICANT CHANGE UP (ref 22–31)
CREAT SERPL-MCNC: 1.36 MG/DL — HIGH (ref 0.5–1.3)
EGFR: 50 ML/MIN/1.73M2 — LOW
GLUCOSE SERPL-MCNC: 111 MG/DL — HIGH (ref 70–99)
HCT VFR BLD CALC: 31.1 % — LOW (ref 39–50)
HGB BLD-MCNC: 10.1 G/DL — LOW (ref 13–17)
INR BLD: 2.94 RATIO — HIGH (ref 0.88–1.16)
MCHC RBC-ENTMCNC: 32.5 GM/DL — SIGNIFICANT CHANGE UP (ref 32–36)
MCHC RBC-ENTMCNC: 32.8 PG — SIGNIFICANT CHANGE UP (ref 27–34)
MCV RBC AUTO: 101 FL — HIGH (ref 80–100)
NRBC # BLD: 0 /100 WBCS — SIGNIFICANT CHANGE UP (ref 0–0)
PLATELET # BLD AUTO: 74 K/UL — LOW (ref 150–400)
POTASSIUM SERPL-MCNC: 4.2 MMOL/L — SIGNIFICANT CHANGE UP (ref 3.5–5.3)
POTASSIUM SERPL-SCNC: 4.2 MMOL/L — SIGNIFICANT CHANGE UP (ref 3.5–5.3)
PROTHROM AB SERPL-ACNC: 34.5 SEC — HIGH (ref 10.5–13.4)
RBC # BLD: 3.08 M/UL — LOW (ref 4.2–5.8)
RBC # FLD: 14.1 % — SIGNIFICANT CHANGE UP (ref 10.3–14.5)
SODIUM SERPL-SCNC: 142 MMOL/L — SIGNIFICANT CHANGE UP (ref 135–145)
WBC # BLD: 5.18 K/UL — SIGNIFICANT CHANGE UP (ref 3.8–10.5)
WBC # FLD AUTO: 5.18 K/UL — SIGNIFICANT CHANGE UP (ref 3.8–10.5)

## 2022-04-27 PROCEDURE — 87086 URINE CULTURE/COLONY COUNT: CPT

## 2022-04-27 PROCEDURE — 83010 ASSAY OF HAPTOGLOBIN QUANT: CPT

## 2022-04-27 PROCEDURE — 81001 URINALYSIS AUTO W/SCOPE: CPT

## 2022-04-27 PROCEDURE — 85025 COMPLETE CBC W/AUTO DIFF WBC: CPT

## 2022-04-27 PROCEDURE — 82607 VITAMIN B-12: CPT

## 2022-04-27 PROCEDURE — 71045 X-RAY EXAM CHEST 1 VIEW: CPT

## 2022-04-27 PROCEDURE — 84443 ASSAY THYROID STIM HORMONE: CPT

## 2022-04-27 PROCEDURE — 93306 TTE W/DOPPLER COMPLETE: CPT

## 2022-04-27 PROCEDURE — 83605 ASSAY OF LACTIC ACID: CPT

## 2022-04-27 PROCEDURE — 84165 PROTEIN E-PHORESIS SERUM: CPT

## 2022-04-27 PROCEDURE — 82728 ASSAY OF FERRITIN: CPT

## 2022-04-27 PROCEDURE — 74176 CT ABD & PELVIS W/O CONTRAST: CPT | Mod: MA

## 2022-04-27 PROCEDURE — 96374 THER/PROPH/DIAG INJ IV PUSH: CPT

## 2022-04-27 PROCEDURE — 83880 ASSAY OF NATRIURETIC PEPTIDE: CPT

## 2022-04-27 PROCEDURE — 0225U NFCT DS DNA&RNA 21 SARSCOV2: CPT

## 2022-04-27 PROCEDURE — 93005 ELECTROCARDIOGRAM TRACING: CPT

## 2022-04-27 PROCEDURE — 83615 LACTATE (LD) (LDH) ENZYME: CPT

## 2022-04-27 PROCEDURE — 85610 PROTHROMBIN TIME: CPT

## 2022-04-27 PROCEDURE — 99239 HOSP IP/OBS DSCHRG MGMT >30: CPT

## 2022-04-27 PROCEDURE — 87040 BLOOD CULTURE FOR BACTERIA: CPT

## 2022-04-27 PROCEDURE — 80053 COMPREHEN METABOLIC PANEL: CPT

## 2022-04-27 PROCEDURE — 83550 IRON BINDING TEST: CPT

## 2022-04-27 PROCEDURE — 84484 ASSAY OF TROPONIN QUANT: CPT

## 2022-04-27 PROCEDURE — 36415 COLL VENOUS BLD VENIPUNCTURE: CPT

## 2022-04-27 PROCEDURE — 93970 EXTREMITY STUDY: CPT

## 2022-04-27 PROCEDURE — 84155 ASSAY OF PROTEIN SERUM: CPT

## 2022-04-27 PROCEDURE — 94640 AIRWAY INHALATION TREATMENT: CPT

## 2022-04-27 PROCEDURE — 85027 COMPLETE CBC AUTOMATED: CPT

## 2022-04-27 PROCEDURE — 84145 PROCALCITONIN (PCT): CPT

## 2022-04-27 PROCEDURE — 97162 PT EVAL MOD COMPLEX 30 MIN: CPT

## 2022-04-27 PROCEDURE — 80048 BASIC METABOLIC PNL TOTAL CA: CPT

## 2022-04-27 PROCEDURE — 86334 IMMUNOFIX E-PHORESIS SERUM: CPT

## 2022-04-27 PROCEDURE — 83521 IG LIGHT CHAINS FREE EACH: CPT

## 2022-04-27 PROCEDURE — 82746 ASSAY OF FOLIC ACID SERUM: CPT

## 2022-04-27 PROCEDURE — 85730 THROMBOPLASTIN TIME PARTIAL: CPT

## 2022-04-27 PROCEDURE — 83540 ASSAY OF IRON: CPT

## 2022-04-27 PROCEDURE — 83735 ASSAY OF MAGNESIUM: CPT

## 2022-04-27 PROCEDURE — 99285 EMERGENCY DEPT VISIT HI MDM: CPT | Mod: 25

## 2022-04-27 PROCEDURE — 99232 SBSQ HOSP IP/OBS MODERATE 35: CPT

## 2022-04-27 PROCEDURE — 72131 CT LUMBAR SPINE W/O DYE: CPT

## 2022-04-27 RX ORDER — FUROSEMIDE 40 MG
1 TABLET ORAL
Qty: 0 | Refills: 0 | DISCHARGE

## 2022-04-27 RX ORDER — CHOLECALCIFEROL (VITAMIN D3) 125 MCG
1000 CAPSULE ORAL
Qty: 0 | Refills: 0 | DISCHARGE
Start: 2022-04-27

## 2022-04-27 RX ORDER — PREGABALIN 225 MG/1
2 CAPSULE ORAL
Qty: 0 | Refills: 0 | DISCHARGE
Start: 2022-04-27

## 2022-04-27 RX ORDER — FOLIC ACID 0.8 MG
1 TABLET ORAL
Qty: 0 | Refills: 0 | DISCHARGE
Start: 2022-04-27

## 2022-04-27 RX ORDER — PANTOPRAZOLE SODIUM 20 MG/1
1 TABLET, DELAYED RELEASE ORAL
Qty: 0 | Refills: 0 | DISCHARGE
Start: 2022-04-27

## 2022-04-27 RX ORDER — WARFARIN SODIUM 2.5 MG/1
1 TABLET ORAL
Qty: 0 | Refills: 0 | DISCHARGE

## 2022-04-27 RX ORDER — RAMIPRIL 5 MG
1 CAPSULE ORAL
Qty: 0 | Refills: 0 | DISCHARGE

## 2022-04-27 RX ORDER — FERROUS GLUCONATE 100 %
1 POWDER (GRAM) MISCELLANEOUS
Qty: 30 | Refills: 0
Start: 2022-04-27

## 2022-04-27 RX ADMIN — PREGABALIN 2000 MICROGRAM(S): 225 CAPSULE ORAL at 08:47

## 2022-04-27 RX ADMIN — Medication 1 MILLIGRAM(S): at 08:48

## 2022-04-27 RX ADMIN — PANTOPRAZOLE SODIUM 40 MILLIGRAM(S): 20 TABLET, DELAYED RELEASE ORAL at 05:43

## 2022-04-27 RX ADMIN — TIOTROPIUM BROMIDE 1 CAPSULE(S): 18 CAPSULE ORAL; RESPIRATORY (INHALATION) at 10:11

## 2022-04-27 RX ADMIN — BUDESONIDE AND FORMOTEROL FUMARATE DIHYDRATE 2 PUFF(S): 160; 4.5 AEROSOL RESPIRATORY (INHALATION) at 10:11

## 2022-04-27 RX ADMIN — Medication 1000 UNIT(S): at 08:48

## 2022-04-27 RX ADMIN — CARVEDILOL PHOSPHATE 25 MILLIGRAM(S): 80 CAPSULE, EXTENDED RELEASE ORAL at 05:43

## 2022-04-27 RX ADMIN — Medication 325 MILLIGRAM(S): at 08:47

## 2022-04-27 RX ADMIN — Medication 100 MICROGRAM(S): at 05:43

## 2022-04-27 RX ADMIN — Medication 40 MILLIGRAM(S): at 05:42

## 2022-04-27 NOTE — DISCHARGE NOTE PROVIDER - NSDCFUSCHEDAPPT_GEN_ALL_CORE_FT
Nova Clement  [facility] Physician [facility]  Med  [facility]  Scheduled Appointment: 05/02/2022    Kyra Alonzo  [facility] Physician [facility]  [facility]  Scheduled Appointment: 06/13/2022    [facility]  Cardio [address]  Scheduled Appointment: 06/28/2022    Casey Raygoza  [facility]  Cardio [address]  Scheduled Appointment: 07/13/2022

## 2022-04-27 NOTE — DISCHARGE NOTE PROVIDER - HOSPITAL COURSE
Hospital Course  87 year old obese man with past medical history of Coronary artery disease (history of myocardial infarction s/p PCI), history of mesenteric ischemia with stent to SMA, possible iliac artery disease, Hypertension, Atrial fibrillation (on warfarin), HFrEF (LVEF 46% in 2021) s/p ICD, CKD, CJ & thrombocytopenia who presented with fever, chills and diarrhea.   He was admitted with Severe sepsis; probably 2/2 to GI virus which did resolve.  He has a CT of A/P without acute pathology, CT of LS spine with diffuse degenerative changes, no change from previous imaging in Feb.  ID consulted; no need for antibx-- no active infection, no acute discitis.  Pt is unable to have an MRI due to ICD.  Blood cultures with NGTD.  Pt. had urinary Retention; had bladder scans with residuals.  It did improve -pt urinating well on day of discharge.  Pt. 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 Kwon.  He has no signs of gross bleeding.  Pt. is a patient of Dr. Hess's will f/u as outpatient, rec is for home Ferrous Gluconate and B12.  Pt. with RUFINO on CKD3, unknown baseline, Creat 1.36 today.  Plan is to  hold ramipril for RUFINO and avoid nephrotoxic medications.  Pt. also with Hypomagnesemia; resolved after repletion.  Pt with CHFrEF, has an ICD.  He was evaluated by Cardiology.    Per rec is to cont Lasix at home, change to 60 mg. daily.  TTE consistent with normal LVEF and signs of elevated filling pressures.  He had b/l LE dopplers:  no DVTs.  Pt. will continue with home Coumadin for chronic Atrial Fib.  Pt. will be discharged home and f/u with Cardiologist next week.    Discharging Provider:  ROSEMARIE Carpenter  Contact Info: Cell [phone] - Please call with any questions or concerns.    Outpatient Provider:  Dr Clement, notified of discharge home

## 2022-04-27 NOTE — PROGRESS NOTE ADULT - SUBJECTIVE AND OBJECTIVE BOX
Patient is a 87y old  Male who presents with a chief complaint of sepsis (2022 10:15)      Patient seen and examined at bedside. No overnight events reported.  Pt. feels good today, wants to go home.  He states he is urinating well.    ALLERGIES:  Lovenox (Other)    MEDICATIONS  (STANDING):  budesonide  80 MICROgram(s)/formoterol 4.5 MICROgram(s) Inhaler 2 Puff(s) Inhalation two times a day  carvedilol 25 milliGRAM(s) Oral every 12 hours  cholecalciferol 1000 Unit(s) Oral daily  cyanocobalamin 2000 MICROGram(s) Oral daily  ferrous    sulfate 325 milliGRAM(s) Oral daily  folic acid 1 milliGRAM(s) Oral daily  furosemide    Tablet 40 milliGRAM(s) Oral daily  levothyroxine 100 MICROGram(s) Oral daily  pantoprazole    Tablet 40 milliGRAM(s) Oral before breakfast  tiotropium 18 MICROgram(s) Capsule 1 Capsule(s) Inhalation daily    MEDICATIONS  (PRN):  acetaminophen     Tablet .. 650 milliGRAM(s) Oral every 6 hours PRN Temp greater or equal to 38C (100.4F), Mild Pain (1 - 3)  benzocaine 15 mG/menthol 3.6 mG Lozenge 1 Lozenge Oral five times a day PRN Sore Throat    Vital Signs Last 24 Hrs  T(F): 98.6 (2022 08:53), Max: 98.6 (2022 23:55)  HR: 70 (2022 10:13) (57 - 70)  BP: 126/61 (2022 08:53) (117/58 - 132/76)  RR: 17 (2022 08:53) (17 - 19)  SpO2: 92% (2022 10:13) (92% - 95%)  I&O's Summary    2022 07:01  -  2022 07:00  --------------------------------------------------------  IN: 0 mL / OUT: 3100 mL / NET: -3100 mL    2022 07: 11:08  --------------------------------------------------------  IN: 0 mL / OUT: 1050 mL / NET: -1050 mL      PHYSICAL EXAM:  General: NAD, A/O x 3  ENT: No gross hearing impairment, Moist mucous membranes, no thrush  Neck: Supple, No JVD  Lungs: Clear to auscultation bilaterally, good air entry, non-labored breathing  Cardio: irreg, No murmur  Abdomen: Soft, obese, Nontender, Nondistended; Bowel sounds present  Extremities: No calf tenderness, No cyanosis, +lower ext edema, non-pitting  Psych: Appropriate mood and affect    LABS:                        10.1   5.18  )-----------( 74       ( 2022 06:10 )             31.1         142  |  107  |  26  ----------------------------<  111  4.2   |  29  |  1.36    Ca    8.6      2022 06:10  Mg     1.8         TPro  6.3  /  Alb  x   /  TBili  x   /  DBili  x   /  AST  x   /  ALT  x   /  AlkPhos  x             PT/INR - ( 2022 06:10 )   PT: 34.5 sec;   INR: 2.94 ratio         PTT - ( 2022 21:10 )  PTT:35.6 sec  Lactate, Blood: 1.7 mmol/L ( @ 23:56)  Lactate, Blood: 2.2 mmol/L ( @ 21:10)    Procalcitonin, Serum: 0.29 ng/mL (22 @ 06:25)  Procalcitonin, Serum: 0.04 ng/mL (22 @ 21:10)    CARDIAC MARKERS ( 2022 06:25 )  x     / 67.2 ng/L / x     / x     / x      CARDIAC MARKERS ( 2022 23:10 )  x     / 70.1 ng/L / x     / x     / x      CARDIAC MARKERS ( 2022 21:10 )  x     / 37.1 ng/L / x     / x     / x            TSH 2.212   TSH with FT4 reflex --  Total T3 --                  Urinalysis Basic - ( 2022 21:25 )    Color: Yellow / Appearance: Clear / S.020 / pH: x  Gluc: x / Ketone: Negative  / Bili: Negative / Urobili: Negative   Blood: x / Protein: 30 mg/dL / Nitrite: Negative   Leuk Esterase: Negative / RBC: 0-4 /HPF / WBC 0-2 /HPF   Sq Epi: x / Non Sq Epi: Neg.-Few / Bacteria: Trace /HPF        Culture - Urine (collected 2022 21:25)  Source: Clean Catch Clean Catch (Midstream)  Final Report (2022 07:10):    No growth    Culture - Blood (collected 2022 21:10)  Source: .Blood Blood-Peripheral  Preliminary Report (2022 06:01):    No growth to date.    Culture - Blood (collected 2022 21:10)  Source: .Blood Blood-Peripheral  Preliminary Report (2022 06:01):    No growth to date.        RADIOLOGY & ADDITIONAL TESTS:  < from: US Duplex Venous Lower Ext Complete, Bilateral (22 @ 20:27) >    IMPRESSION:  No evidence of deep venous thrombosis in either lower extremity.      < end of copied text >    Care Discussed with Consultants/Other Providers:

## 2022-04-27 NOTE — PROGRESS NOTE ADULT - ASSESSMENT
Assessment:  Jj Burciaga is an 87 year old obese man with past medical history of Coronary artery disease (history of myocardial infarction s/p PCI), history of mesenteric ischemia with stent to SMA, possible iliac artery disease, Hypertension, Atrial fibrillation (on warfarin), HFrEF (LVEF 46% in 2021) s/p ICD, CKD, CJ & thrombocytopenia who presented with fever, chills and diarrhea.     Cardiology consulted to evaluate for CHF. ECG consistent with ventricular paced rhythm. Troponins at upper limits of normal. Presentation does not appear to be primarily an acute coronary syndrome. Pro BNP elevated, echocardiogram consistent with normal LVEF and signs of elevated filling pressures.    Recommendations:  [] Fever, chills, diarrhea: Follow up ID workup  [] HFpEF: Signs of elevated filling pressures, would increase Lasix to 60 mg daily, recommended patient to follow-up with his cardiologist, Dr. Razo next week. May continue home Carvedilol 25 mg BID. Hold ACE-I due to elevated Cr on presentation.   [] Atrial fibrillation: Continue home coumadin    Will sign off, please re-consult if needed. Explained to patient to follow-up next week with his cardiologist, Dr. Danni Vidal MD  Cardiology

## 2022-04-27 NOTE — PROGRESS NOTE ADULT - ASSESSMENT
87 year old obese man with past medical history of Coronary artery disease (history of myocardial infarction s/p PCI), history of mesenteric ischemia with stent to SMA, possible iliac artery disease, Hypertension, Atrial fibrillation (on warfarin), HFrEF (LVEF 46% in 2021) s/p ICD, CKD, CJ & thrombocytopenia who presented with fever, chills and diarrhea.     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 Hess as outpatient, known to their service  -rec is to cont home Ferrous Gluconate and B12     #RUFINO on CKD3   -Unknown baseline, Creat 1.36 today  - hold ramipril for RUFINO  -Avoid nephrotoxic medications    #Hypomagnesemia; resolved  -repleted and improved    #HTN  -Cont coreg with hold parameters  -Continue to hold ACE-I for now for RUFINO   -Monitor vitals    #CHFrEF  #Chronic atrial fibrillation on long term anticoagulation with coumadin, therapeutic INR  #s/p ICD  -ICD not compatible with MRI  -Cont coreg with hold parameters for rate control  -Continue coumadin with daily INR. Goal INR 2-3  -Coumadin 7.5mg daily at home  -Continue Lasix, per Cardiology rec home dose change to 60 mg. daily  -TTE consistent with normal LVEF and signs of elevated filling pressures.  -B/L LE dopplers:  no DVT  -Cardiology consulted- f/u with Dr. Razo as outpatient    #hx of mesenteric ischemia with SMA stent x 2  - Continue coumadin    #Hypothyroidism  -Cont Levothyroxine 100 mcg daily (confirmed by pharmacy)  - TSH normal    #DVT ppx - coumadin    Dispo: D/C home today, updated wife, Ceci Burciaga [phone].

## 2022-04-27 NOTE — DISCHARGE NOTE PROVIDER - CARE PROVIDER_API CALL
[facility])  Family Medicine  [address] [place] [address]  Phone: [phone]  Fax: [phone]  Follow Up Time:

## 2022-04-27 NOTE — PROGRESS NOTE ADULT - NS ATTEND AMEND GEN_ALL_CORE FT
Stable for discharge  C/w current medications  D/c 45 min
87M with PMH CAD, hx of mesenteric ischemia with stent to SMA, chronic a fib (on Coumadin), HTN, HFrEF (LVEF 46% in 2021) s/p ICD, CKD Stage 3 presented with severe sepsis 2/2 ?discitis ?diarrhea. Discussed with cardio Dr Vidal, continue to hold Lasix due to RUFINO. Follow up TTE.   Continue IV abx. Repeat CBC in afternoon for anemia and thrombocytopenia.  Repeat lower extremity dopplers for edema.   Bladder scan and straight cath for urinary retention.
Sepsis possibly due to diskitis  RUFINO on CKD  Afib on coumadin, long term anticoagulation  LE edema  Chronic systolic CHF  Anemia stable with possible MDS  Acute urinary retention  Gait imbalance    Will f/u with ID, c/w abx, cannot have MRI; has AICD  will consider further imaging???? Neurosx vs ortho consult???  coumadin 7.5mg tonight  LE dopplers, PT eval  Lasix 40mg daily, f/u cardio  Monitor I/O, bladder scan today  Throat culture    D/c plan in 24-48hrs: REN vs home with home PT  AM labs

## 2022-04-27 NOTE — DISCHARGE NOTE NURSING/CASE MANAGEMENT/SOCIAL WORK - NSDCPEFALRISK_GEN_ALL_CORE
For information on Fall & Injury Prevention, visit: [email or web address] OR  [email or web address] OR  https://www.cdc.gov/steadi/patient.html

## 2022-04-27 NOTE — PROGRESS NOTE ADULT - SUBJECTIVE AND OBJECTIVE BOX
CC: f/u for fever and sepsis    Patient reports: he is alert and comfortable, no complaints    REVIEW OF SYSTEMS:  All other review of systems negative (Comprehensive ROS)    Antimicrobials Day #  :off    Other Medications Reviewed    T(F): 98.3 (04-27-22 @ 11:50), Max: 98.6 (04-26-22 @ 23:55)  HR: 60 (04-27-22 @ 11:50)  BP: 122/61 (04-27-22 @ 11:50)  RR: 16 (04-27-22 @ 11:50)  SpO2: 96% (04-27-22 @ 11:50)  Wt(kg): --    PHYSICAL EXAM:  General: alert, no acute distress  Eyes:  anicteric, no conjunctival injection, no discharge  Oropharynx: no lesions or injection 	  Neck: supple, without adenopathy  Lungs: clear to auscultation  Heart: irregular rate and rhythm; no murmur, rubs or gallops  Abdomen: soft, nondistended, nontender, without mass or organomegaly  Skin: no lesions  Extremities: no clubbing, cyanosis, + edema  Neurologic: alert, oriented, moves all extremities    LAB RESULTS:                        10.1   5.18  )-----------( 74       ( 27 Apr 2022 06:10 )             31.1     04-27    142  |  107  |  26<H>  ----------------------------<  111<H>  4.2   |  29  |  1.36<H>    Ca    8.6      27 Apr 2022 06:10  Mg     1.8     04-26    TPro  6.3  /  Alb  x   /  TBili  x   /  DBili  x   /  AST  x   /  ALT  x   /  AlkPhos  x   04-26    LIVER FUNCTIONS - ( 26 Apr 2022 06:10 )  Alb: x     / Pro: 6.3 g/dL / ALK PHOS: x     / ALT: x     / AST: x     / GGT: x             MICROBIOLOGY:  RECENT CULTURES:  04-24 @ 21:25 Clean Catch Clean Catch (Midstream)     No growth      04-24 @ 21:10 .Blood Blood-Peripheral     No growth to date.          RADIOLOGY REVIEWED:    < from: US Duplex Venous Lower Ext Complete, Bilateral (04.26.22 @ 20:27) >  IMPRESSION:  No evidence of deep venous thrombosis in either lower extremity.    < end of copied text >

## 2022-04-27 NOTE — DISCHARGE NOTE PROVIDER - NSDCCONDITION_GEN_ALL_CORE
Stable
Principal Discharge DX:	COPD exacerbation  Goal:	Risk management, symptom management.  Assessment and plan of treatment:	Please continue medications as above. Continue supplemental oxygen as needed. You may follow up with Dr. Almonte within 1 week of discharge. In the event of severe shortness of breath, please present to you nearest emergency facility. For vocal cord paralysis, you may follow up with an ENT professional. Follow up with Dr. Joy within 1 month.

## 2022-04-27 NOTE — PROGRESS NOTE ADULT - SUBJECTIVE AND OBJECTIVE BOX
MELISSA BROWER  [id number]      Chief Complaint: Fever/Chills/Diarrhea/CHF/AF    Interval History: The patient reports breathing is ok today    Tele: V paced 60s BPM      Current meds:   acetaminophen     Tablet .. 650 milliGRAM(s) Oral every 6 hours PRN  benzocaine 15 mG/menthol 3.6 mG Lozenge 1 Lozenge Oral five times a day PRN  budesonide  80 MICROgram(s)/formoterol 4.5 MICROgram(s) Inhaler 2 Puff(s) Inhalation two times a day  carvedilol 25 milliGRAM(s) Oral every 12 hours  cholecalciferol 1000 Unit(s) Oral daily  cyanocobalamin 2000 MICROGram(s) Oral daily  ferrous    sulfate 325 milliGRAM(s) Oral daily  folic acid 1 milliGRAM(s) Oral daily  furosemide    Tablet 40 milliGRAM(s) Oral daily  levothyroxine 100 MICROGram(s) Oral daily  pantoprazole    Tablet 40 milliGRAM(s) Oral before breakfast  tiotropium 18 MICROgram(s) Capsule 1 Capsule(s) Inhalation daily      Objective:     Vital Signs:   T(C): 37 (04-27-22 @ 08:53), Max: 37 (04-26-22 @ 23:55)  HR: 70 (04-27-22 @ 10:13) (57 - 70)  BP: 126/61 (04-27-22 @ 08:53) (117/58 - 132/76)  RR: 17 (04-27-22 @ 08:53) (17 - 19)  SpO2: 92% (04-27-22 @ 10:13) (92% - 95%)  Wt(kg): --    PHYSICAL EXAM:  General: obese, elderly man, no acute distress  HEENT: sclera anicteric  Neck: supple  CVS: JVP ~ 7 cm H20, RRR, s1, s2, no murmurs/rubs  Chest: unlabored respirations, decreased breath sounds  Abdomen: obese  Extremities: chronic, non-pitting lower extremity edema b/l  Neuro: awake, alert & oriented x 3  Psych: normal affect    Labs:   27 Apr 2022 06:10    142    |  107    |  26     ----------------------------<  111    4.2     |  29     |  1.36     Ca    8.6        27 Apr 2022 06:10  Mg     1.8       26 Apr 2022 06:05    TPro  6.3    /  Alb  x      /  TBili  x      /  DBili  x      /  AST  x      /  ALT  x      /  AlkPhos  x      26 Apr 2022 06:10                          10.1   5.18  )-----------( 74       ( 27 Apr 2022 06:10 )             31.1     PT/INR - ( 27 Apr 2022 06:10 )   PT: 34.5 sec;   INR: 2.94 ratio               Outpatient TTE (4/2021):  LVEF 46%  Normal RV size and function  Mild AR  Moderate pulmonary hypertension  No pericardial effusion     TTE (4/25/22);   1. Technically difficult study with poor endocardial visualization.   2. Normal global left ventricular systolic function.   3. Left ventricular ejection fraction, by visual estimation, is 55 to 60%.   4. Moderately increased LV wall thickness.   5. Normal left ventricular internal cavity size.   6. The right ventricle is not well visualized, appears mildly enlarged   with normal systolic function.   7. Left atrial enlargement.   8. Right atrial enlargement.   9. Trace mitral valve regurgitation.  10. Moderate tricuspid regurgitation.  11. Mild aortic valve leaflet calcification.  12. Top normal aorta at the Sinuses of Valsalva (3.6 cm).  13. Estimated pulmonary artery systolic pressure is 62.3 mmHg assuming a right atrial pressure of 15 mmHg, which is consistent with severe pulmonary hypertension.  14. The pericardium was not well visualized.    ECG (4/24/22); ventricular paced    CT Abdomen/Pelvis (4/24/22);  Although evaluation of bowel is limited without oral and intravenous   contrast, no definite CT evidence for colitis is suggested.  No bowel obstruction, significant ascites or free air.  Nonspecific mild stranding in the retroperitoneum and mesentery. Question infectious/inflammatory in etiology.

## 2022-04-27 NOTE — PROGRESS NOTE ADULT - PROVIDER SPECIALTY LIST ADULT
Hospitalist
Infectious Disease
Cardiology
Cardiology
Hospitalist
Hospitalist
Infectious Disease
Heme/Onc

## 2022-04-27 NOTE — PROGRESS NOTE ADULT - ASSESSMENT
Patient is a 87y male with a past history of CAD, A fib, DM, mesenteric ischemia with SMA stent, ICD/PPM, possible MDS, admitted with fever to 102.7, chills and diarrhea on 4/24.  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  He was feeling okay until yesterday when he developed severe chills and had to go inside.He than has diarrhea and became weak, came to ER where his fever was 102.7. CT of A/P without acute pathology, Ct of LS spine with concerns raised of discitis of L2-L3.This study was not compared with prior study 2 months ago .He denies any recent OPD infections or antibiotic use.He was started on CTX and metronidazole.He also received a dose of pip-tazobactam.  This is a difficult case in that while I normally would advise observing off antibiotics when there is a concern of discitis so that a biopsy can be obtained, If there are concerns of sepsis which existed on 4/24 on admission we have to initiate antibiotics.  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 I am not sure if diarrhea was a  nonspecific symptom of sepsis or he truly had a primary GI illness. Given negative CT scan of A/P, benign abdomin, and lack of diarrhea I tend to doubt a primary GI illness.   He has ongoing lower back pain, if CT findings are new c/w Feb 2022 than I would be concerned he has an infectious process. However, not clear if a spinal process accounting for fever to 102.7 on admission, as he has pain x months without any fever and usually discitis is an indolent infection unless related to an aggressive organism such as staph aureus.  Hi spine films have been reviewed with radiology and are being compared to Feb films. There is no change, probably degenerative in nature.  Hence, no need for additional w/u. of spine. He can not get an MRI secondary to ICD,  His antibiotics were stopped on 4/26 and he remains afebrile and stable  Suggest:  1.No ID objection to discharge planning  2. Case reviewed with wife at bedside, advised that if fevers return he will need additional w/u  3.No need to w/u for discitis  4.possible viral GE although fever higher than is typically seen.  4 If he spikes a fever would send repeat blood cultures.

## 2022-04-27 NOTE — DISCHARGE NOTE PROVIDER - NSDCMRMEDTOKEN_GEN_ALL_CORE_FT
carvedilol 25 mg oral tablet: orally 2 times a day  cholecalciferol oral tablet: 1000 unit(s) orally once a day  cyanocobalamin 1000 mcg oral tablet: 2 tab(s) orally once a day  ferrous gluconate 324 mg (38 mg elemental iron) oral tablet: 1 tab(s) orally once a day   folic acid 1 mg oral tablet: 1 tab(s) orally once a day  Lasix 40 mg oral tablet: 1.5 tab(s) orally once a day  levothyroxine 100 mcg (0.1 mg) oral tablet: 1 tab(s) orally once a day  nitroglycerin 0.4 mg sublingual tablet: 1 tab(s) sublingual every 5 minutes, As Needed  pantoprazole 40 mg oral delayed release tablet: 1 tab(s) orally once a day (before a meal)  potassium chloride 10 mEq oral capsule, extended release: 1 cap(s) orally once a day  Trelegy Ellipta 200 mcg-62.5 mcg-25 mcg/inh inhalation powder: 1 puff(s) inhaled once a day  warfarin 5 mg oral tablet: 5mg daily at bedtime -- except on Monday and Wed. take 2.5 mg.

## 2022-04-27 NOTE — DISCHARGE NOTE PROVIDER - NSDCCPCAREPLAN_GEN_ALL_CORE_FT
PRINCIPAL DISCHARGE DIAGNOSIS  Diagnosis: Sepsis  Assessment and Plan of Treatment:   -you had an infection causing sespis; which was probably caused by a gastrointestional virus   -you were evaluated by Infectious Disease MD and recommendation is for no antibiotics since infection seems to be resolved  -you had a CAT scan of your abdomen and spine, which did not show signs of acute infection, you have chronic degenerative changes in your lumbar spine      SECONDARY DISCHARGE DIAGNOSES  Diagnosis: Chronic diastolic congestive heart failure  Assessment and Plan of Treatment: -continue Lasix, per Cardiology increase dose to 60 mg. daily  -stop taking Lisinopril due to abnormal kidney function  -follow up with Dr. Razo, Cardiology to re-evaluate    Diagnosis: Anemia due to vitamin B12 deficiency  Assessment and Plan of Treatment: -continue home Iron, Vit B12  -follow up with Dr. Hess, Hematology next week

## 2022-04-27 NOTE — DISCHARGE NOTE NURSING/CASE MANAGEMENT/SOCIAL WORK - PATIENT PORTAL LINK FT
You can access the FollowMyHealth Patient Portal offered by [facility] by registering at the following website: [email or web address]. By joining [employer]’s FollowMyHealth portal, you will also be able to view your health information using other applications (apps) compatible with our system.

## 2022-04-27 NOTE — PROGRESS NOTE ADULT - NS ATTEND OPT1 GEN_ALL_CORE
I attest my time as attending is greater than 50% of the total combined time spent on qualifying patient care activities by the PA/NP and attending.
I independently performed the documented:
I attest my time as attending is greater than 50% of the total combined time spent on qualifying patient care activities by the PA/NP and attending.

## 2022-04-28 LAB
% ALBUMIN: 58.8 % — SIGNIFICANT CHANGE UP
% ALPHA 1: 5.3 % — SIGNIFICANT CHANGE UP
% ALPHA 2: 11.8 % — SIGNIFICANT CHANGE UP
% BETA: 10.2 % — SIGNIFICANT CHANGE UP
% GAMMA: 13.9 % — SIGNIFICANT CHANGE UP
ALBUMIN SERPL ELPH-MCNC: 3.7 G/DL — SIGNIFICANT CHANGE UP (ref 3.6–5.5)
ALBUMIN/GLOB SERPL ELPH: 1.4 RATIO — SIGNIFICANT CHANGE UP
ALPHA1 GLOB SERPL ELPH-MCNC: 0.3 G/DL — SIGNIFICANT CHANGE UP (ref 0.1–0.4)
ALPHA2 GLOB SERPL ELPH-MCNC: 0.7 G/DL — SIGNIFICANT CHANGE UP (ref 0.5–1)
B-GLOBULIN SERPL ELPH-MCNC: 0.6 G/DL — SIGNIFICANT CHANGE UP (ref 0.5–1)
GAMMA GLOBULIN: 0.9 G/DL — SIGNIFICANT CHANGE UP (ref 0.6–1.6)
INTERPRETATION SERPL IFE-IMP: SIGNIFICANT CHANGE UP
PROT PATTERN SERPL ELPH-IMP: SIGNIFICANT CHANGE UP

## 2022-04-29 ENCOUNTER — NON-APPOINTMENT (OUTPATIENT)
Age: 87
End: 2022-04-29

## 2022-04-30 LAB
CULTURE RESULTS: SIGNIFICANT CHANGE UP
CULTURE RESULTS: SIGNIFICANT CHANGE UP
SPECIMEN SOURCE: SIGNIFICANT CHANGE UP
SPECIMEN SOURCE: SIGNIFICANT CHANGE UP

## 2022-05-05 ENCOUNTER — NON-APPOINTMENT (OUTPATIENT)
Age: 87
End: 2022-05-05

## 2022-05-05 ENCOUNTER — APPOINTMENT (OUTPATIENT)
Dept: CARDIOLOGY | Facility: CLINIC | Age: 87
End: 2022-05-05
Payer: MEDICARE

## 2022-05-05 VITALS
TEMPERATURE: 97.8 F | BODY MASS INDEX: 34.91 KG/M2 | DIASTOLIC BLOOD PRESSURE: 70 MMHG | HEIGHT: 74 IN | HEART RATE: 62 BPM | SYSTOLIC BLOOD PRESSURE: 116 MMHG | WEIGHT: 272 LBS | RESPIRATION RATE: 17 BRPM | OXYGEN SATURATION: 96 %

## 2022-05-05 PROCEDURE — 85610 PROTHROMBIN TIME: CPT | Mod: QW

## 2022-05-05 PROCEDURE — 99214 OFFICE O/P EST MOD 30 MIN: CPT

## 2022-05-05 PROCEDURE — 93000 ELECTROCARDIOGRAM COMPLETE: CPT

## 2022-05-05 PROCEDURE — ZZZZZ: CPT

## 2022-05-05 RX ORDER — CHOLECALCIFEROL (VITAMIN D3)
CRYSTALS MISCELLANEOUS
Refills: 0 | Status: ACTIVE | COMMUNITY
Start: 2022-05-05

## 2022-05-06 LAB
INR PPP: 2.2 RATIO
POCT-PROTHROMBIN TIME: 25.9 SECS

## 2022-05-13 ENCOUNTER — APPOINTMENT (OUTPATIENT)
Dept: FAMILY MEDICINE | Facility: CLINIC | Age: 87
End: 2022-05-13
Payer: MEDICARE

## 2022-05-13 VITALS
WEIGHT: 268 LBS | HEART RATE: 60 BPM | SYSTOLIC BLOOD PRESSURE: 145 MMHG | TEMPERATURE: 96.8 F | DIASTOLIC BLOOD PRESSURE: 85 MMHG | BODY MASS INDEX: 34.41 KG/M2 | RESPIRATION RATE: 16 BRPM | OXYGEN SATURATION: 97 %

## 2022-05-13 DIAGNOSIS — Z45.09 ENCOUNTER FOR ADJUSTMENT AND MANAGEMENT OF OTHER CARDIAC DEVICE: ICD-10-CM

## 2022-05-13 DIAGNOSIS — Z79.01 ENCOUNTER FOR THERAPEUTIC DRUG LVL MONITORING: ICD-10-CM

## 2022-05-13 DIAGNOSIS — R60.9 EDEMA, UNSPECIFIED: ICD-10-CM

## 2022-05-13 DIAGNOSIS — Z51.81 ENCOUNTER FOR THERAPEUTIC DRUG LVL MONITORING: ICD-10-CM

## 2022-05-13 PROCEDURE — 99214 OFFICE O/P EST MOD 30 MIN: CPT

## 2022-05-13 NOTE — HISTORY OF PRESENT ILLNESS
[FreeTextEntry1] : hospital discharged f/u [de-identified] : 87 year old PMHX Coronary artery disease(history of myocardial infarction s/p PCI), history of mesenteric ischemia with stent to SMA, possible iliac artery disease, Hypertension, Atrial fibrillation(on warfarin), HFrEF (LVEF 46% in 2021) s/p ICD, CKD, CJ & thrombocytopenia, presented with fever, chills and diarrhea. Admitted with Severe sepsis; probably 2/2 to GI virus which did resolve. He has a CT of A/P without acute pathology, CT of LS spine with diffuse degenerative changes, no change.  No active infection, no acute discitis. Pt is unable to have an MRI due to ICD. Blood cultures with NGTD. Pt. 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 Kwon. He has no signs of gross bleeding. Pt. is on Ferrous Gluconate and B12. Pt. with RUFINO on CKD3, unknown baseline, Creat 1.36 today. Pt with CHFrEF, has an ICD. He evaluated by Cardiology. Per rec is to cont Lasix at home, change to 60 mg. daily. He had b/l LE  and still has it his lasix was increased to 60 mg\par Pt pain now on scale 5-6/10.

## 2022-05-13 NOTE — PHYSICAL EXAM
[Normal S1, S2] : normal S1 and S2 [Normal] : soft, non-tender, non-distended, no masses palpated, no HSM and normal bowel sounds [de-identified] : IRR [de-identified] : left LS spine at L5-4 level no masses or ski nrashes or changes no bowel or bladder issues  [de-identified] : 2+ edema symetric bilateral lower extremities

## 2022-05-13 NOTE — REVIEW OF SYSTEMS
[Fatigue] : fatigue [Lower Ext Edema] : lower extremity edema [Dyspnea on Exertion] : dyspnea on exertion [Negative] : Integumentary [FreeTextEntry7] : lower pain abd muscle area

## 2022-05-16 NOTE — DISCUSSION/SUMMARY
[Procedure Intermediate Risk] : the procedure risk is intermediate [Patient Intermediate Risk] : the patient is an intermediate risk

## 2022-05-16 NOTE — PHYSICAL EXAM
[General Appearance - Well Developed] : well developed [Well Groomed] : well groomed [General Appearance - Well Nourished] : well nourished [General Appearance - In No Acute Distress] : no acute distress [Eyelids - No Xanthelasma] : the eyelids demonstrated no xanthelasmas [Normal Oral Mucosa] : normal oral mucosa [No Oral Pallor] : no oral pallor [No Oral Cyanosis] : no oral cyanosis [Normal Jugular Venous A Waves Present] : normal jugular venous A waves present [Normal Jugular Venous V Waves Present] : normal jugular venous V waves present [No Jugular Venous Perez A Waves] : no jugular venous perez A waves [Respiration, Rhythm And Depth] : normal respiratory rhythm and effort [Exaggerated Use Of Accessory Muscles For Inspiration] : no accessory muscle use [Auscultation Breath Sounds / Voice Sounds] : lungs were clear to auscultation bilaterally [Edema] : no peripheral edema present [Abdomen Soft] : soft [Abdomen Tenderness] : non-tender [Abdomen Mass (___ Cm)] : no abdominal mass palpated [Skin Color & Pigmentation] : normal skin color and pigmentation [] : no rash [No Venous Stasis] : no venous stasis [Skin Lesions] : no skin lesions [No Skin Ulcers] : no skin ulcer [No Xanthoma] : no  xanthoma was observed [Oriented To Time, Place, And Person] : oriented to person, place, and time [Affect] : the affect was normal [Mood] : the mood was normal [No Anxiety] : not feeling anxious [Well Developed] : well developed [Well Nourished] : well nourished [No Acute Distress] : no acute distress [Normal Conjunctiva] : normal conjunctiva [Normal Venous Pressure] : normal venous pressure [No Carotid Bruit] : no carotid bruit [Normal S1, S2] : normal S1, S2 [No Murmur] : no murmur [No Rub] : no rub [No Gallop] : no gallop [Clear Lung Fields] : clear lung fields [Good Air Entry] : good air entry [No Respiratory Distress] : no respiratory distress  [Soft] : abdomen soft [Non Tender] : non-tender [No Masses/organomegaly] : no masses/organomegaly [Normal Bowel Sounds] : normal bowel sounds [Normal Gait] : normal gait [No Cyanosis] : no cyanosis [No Clubbing] : no clubbing [No Varicosities] : no varicosities [No Rash] : no rash [No Skin Lesions] : no skin lesions [Moves all extremities] : moves all extremities [No Focal Deficits] : no focal deficits [Normal Speech] : normal speech [Alert and Oriented] : alert and oriented [Normal memory] : normal memory [de-identified] : bilateral 3 to 4+ [Left Infraclavicular] : left infraclavicular area [FreeTextEntry1] : clean protruding wire but no erythema or sings of dermatologic erythema or compromise

## 2022-05-16 NOTE — ASSESSMENT
[FreeTextEntry1] : INR today is 2.2 which is satisfactory. he takes warfarin 2.5 mg 2 days/week and 5 mg all other days. he will see Dr. Gresham in spine management and pain management evaluation in [place].\par I suspect that the spine is complex and complicated and suspect that the procedure may be increased risk especially while being monitored for anticoagulation\par  they are reluctant to undergo treatment with enoxaparin because of a spontaneous periorbital hemorrhage on his last go around\par \par medical necessity\par this is a high-risk encounter based upon perioperative management of anticoagulants and review of risks and benefits of the procedure in a high-risk patient

## 2022-05-16 NOTE — HISTORY OF PRESENT ILLNESS
[Preoperative Visit] : for a medical evaluation prior to surgery [Scheduled Procedure ___] : a [unfilled] [de-identified] : to be determined [FreeTextEntry1] : Jj was recently hospitalized with a diarrheal type illness he was seen by ID service and was noted to have a markedly abnormal CAT scan. he is felt to be a high risk for epidural and he understands this risk and is unsure about proceeding with a epidural. Dr. Allan is offering a paraspinal injection which may be safer.

## 2022-05-20 ENCOUNTER — APPOINTMENT (OUTPATIENT)
Dept: CARDIOLOGY | Facility: CLINIC | Age: 87
End: 2022-05-20
Payer: MEDICARE

## 2022-05-20 ENCOUNTER — NON-APPOINTMENT (OUTPATIENT)
Age: 87
End: 2022-05-20

## 2022-05-20 VITALS
OXYGEN SATURATION: 96 % | SYSTOLIC BLOOD PRESSURE: 147 MMHG | BODY MASS INDEX: 34.65 KG/M2 | TEMPERATURE: 97.2 F | RESPIRATION RATE: 17 BRPM | HEIGHT: 74 IN | DIASTOLIC BLOOD PRESSURE: 77 MMHG | HEART RATE: 61 BPM | WEIGHT: 270 LBS

## 2022-05-20 LAB
INR PPP: 2.2 RATIO
POCT-PROTHROMBIN TIME: 26.3 SECS
QUALITY CONTROL: YES

## 2022-05-20 PROCEDURE — 85610 PROTHROMBIN TIME: CPT | Mod: QW

## 2022-05-20 PROCEDURE — 93000 ELECTROCARDIOGRAM COMPLETE: CPT

## 2022-05-20 PROCEDURE — 99214 OFFICE O/P EST MOD 30 MIN: CPT

## 2022-05-22 NOTE — ASSESSMENT
[FreeTextEntry1] : INR today is 2.2 which is satisfactory. he takes warfarin 2.5 mg 2 days/week and 5 mg all other days. he will see Dr. Gresham in spine management and pain management evaluation in [place].I suspect that the spine is complex and complicated and suspect that the procedure may be increased risk especially while being monitored for anticoagulation. I suggested the following regimen Coumadin would be discontinued five days prior to the planned procedure and INR will be checked daily and when the INR goes below two then enoxaparin 60 mg two times per day will be instituted; the evening prior and the day of surgery enoxaparin would be held in anticipation of surgery .his wife was reluctant to give the dose subcutaneously although this will be evaluated\par \par medical necessity\par this is a high-risk encounter based upon perioperative management of anticoagulants and review of risks and benefits of the procedure in a high-risk patient

## 2022-05-22 NOTE — PHYSICAL EXAM
[General Appearance - Well Developed] : well developed [Well Groomed] : well groomed [General Appearance - Well Nourished] : well nourished [General Appearance - In No Acute Distress] : no acute distress [Eyelids - No Xanthelasma] : the eyelids demonstrated no xanthelasmas [Normal Oral Mucosa] : normal oral mucosa [No Oral Pallor] : no oral pallor [No Oral Cyanosis] : no oral cyanosis [Normal Jugular Venous A Waves Present] : normal jugular venous A waves present [Normal Jugular Venous V Waves Present] : normal jugular venous V waves present [No Jugular Venous Perez A Waves] : no jugular venous perez A waves [Respiration, Rhythm And Depth] : normal respiratory rhythm and effort [Exaggerated Use Of Accessory Muscles For Inspiration] : no accessory muscle use [Auscultation Breath Sounds / Voice Sounds] : lungs were clear to auscultation bilaterally [Edema] : no peripheral edema present [Abdomen Soft] : soft [Abdomen Tenderness] : non-tender [Abdomen Mass (___ Cm)] : no abdominal mass palpated [Skin Color & Pigmentation] : normal skin color and pigmentation [] : no rash [No Venous Stasis] : no venous stasis [Skin Lesions] : no skin lesions [No Skin Ulcers] : no skin ulcer [No Xanthoma] : no  xanthoma was observed [Oriented To Time, Place, And Person] : oriented to person, place, and time [Affect] : the affect was normal [Mood] : the mood was normal [No Anxiety] : not feeling anxious [Well Developed] : well developed [Well Nourished] : well nourished [No Acute Distress] : no acute distress [Normal Conjunctiva] : normal conjunctiva [Normal Venous Pressure] : normal venous pressure [No Carotid Bruit] : no carotid bruit [Normal S1, S2] : normal S1, S2 [No Murmur] : no murmur [No Rub] : no rub [No Gallop] : no gallop [Clear Lung Fields] : clear lung fields [Good Air Entry] : good air entry [No Respiratory Distress] : no respiratory distress  [Soft] : abdomen soft [Non Tender] : non-tender [No Masses/organomegaly] : no masses/organomegaly [Normal Bowel Sounds] : normal bowel sounds [Normal Gait] : normal gait [No Cyanosis] : no cyanosis [No Clubbing] : no clubbing [No Varicosities] : no varicosities [No Rash] : no rash [No Skin Lesions] : no skin lesions [Moves all extremities] : moves all extremities [No Focal Deficits] : no focal deficits [Normal Speech] : normal speech [Alert and Oriented] : alert and oriented [Normal memory] : normal memory [Left Infraclavicular] : left infraclavicular area [de-identified] : bilateral 3 to 4+ [FreeTextEntry1] : clean protruding wire but no erythema or sings of dermatologic erythema or compromise

## 2022-05-22 NOTE — HISTORY OF PRESENT ILLNESS
[Preoperative Visit] : for a medical evaluation prior to surgery [Scheduled Procedure ___] : a [unfilled] [Surgeon Name ___] : surgeon: [unfilled] [de-identified] : Priscila [FreeTextEntry1] : Jj was recently hospitalized with a diarrheal type illness he was seen by ID service and was noted to have a markedly abnormal CAT scan. He is felt to be a high risk for epidural and he understands this risk  he is seeing the Dr. Gresham in [place] phone [phone] fax [phone]

## 2022-05-25 ENCOUNTER — LABORATORY RESULT (OUTPATIENT)
Age: 87
End: 2022-05-25

## 2022-06-06 ENCOUNTER — OUTPATIENT (OUTPATIENT)
Dept: OUTPATIENT SERVICES | Facility: HOSPITAL | Age: 87
LOS: 1 days | Discharge: HOME | End: 2022-06-06

## 2022-06-06 DIAGNOSIS — Z98.89 OTHER SPECIFIED POSTPROCEDURAL STATES: Chronic | ICD-10-CM

## 2022-06-06 DIAGNOSIS — D64.9 ANEMIA, UNSPECIFIED: ICD-10-CM

## 2022-06-09 ENCOUNTER — LABORATORY RESULT (OUTPATIENT)
Age: 87
End: 2022-06-09

## 2022-06-13 ENCOUNTER — APPOINTMENT (OUTPATIENT)
Dept: HEMATOLOGY ONCOLOGY | Facility: CLINIC | Age: 87
End: 2022-06-13

## 2022-06-18 ENCOUNTER — NON-APPOINTMENT (OUTPATIENT)
Age: 87
End: 2022-06-18

## 2022-06-18 ENCOUNTER — LABORATORY RESULT (OUTPATIENT)
Age: 87
End: 2022-06-18

## 2022-06-30 NOTE — REASON FOR VISIT
[Home] : at home, [unfilled] , at the time of the visit. [Medical Office: ([facility])___] : at the medical office located in  [Spouse] : spouse [Patient] : the patient [Self] : self [Follow-Up Visit] : a follow-up visit for [FreeTextEntry2] : pancytopenia

## 2022-06-30 NOTE — PHYSICAL EXAM
[Normal] : affect appropriate [de-identified] : breathing appeared unlabored [de-identified] : coloration appeared normal

## 2022-06-30 NOTE — ASSESSMENT
[FreeTextEntry1] : Lab work reviewed.\par Patient with history of pancytopenia with elevated MCV and increased monocyte percentage–most recent  ANC available WNL, hemoglobin stable.  Platelet count appears to be fluctuating/acceptable at present.\par It remains possible the patient has an underlying myelodysplastic process/CMML.  No overt bleeding reported currently. \par \par Should hematologic scenario worsen/change, then further evaluation with follow-up BMB would be warranted should he wish to pursue this.\par \par Patient to continue p.o. vitamin B12 supplement for h/o vitamin B-12 level less than 400, and p.o. folic acid (h/o slightly elevated homocysteine level).\par \par Patient and his wife were given the opportunity to ask questions.  Their questions have been answered to their apparent satisfaction at this time.

## 2022-06-30 NOTE — HISTORY OF PRESENT ILLNESS
[de-identified] : 2012–Pancytopenia with macrocytosis.\par \par 10/2016–Bone marrow biopsy and bone marrow aspirate showed a hypercellular bone marrow with trilineage hematopoiesis and maturation, increased iron stores.  Flow cytometry myeloid immunophenotypic findings showed no increase in myeloid immaturity.  The lymphocyte immunophenotypic findings showed T-cell predominance and rare B cells.  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 Alcazar who monitored his CBC with differential.\par \par 7/2020–Peripheral blood flow cytometry lymphocyte immunophenotypic findings showed no diagnostic abnormalities with lymphopenia.  Myeloid immunophenotypic findings showed normal myeloid granularity with no increase in myeloid immaturity.  No significant absolute lymphocytosis, monocytosis or neutropenia.  MDS FISH panel normal. [de-identified] : No LN complaints. No bleeding. No fevers, cough. No abdominal pain. No current c/o CP.\par Had an epidural injection last week for chronic LBP.\par \par Got COVID vaccines (Moderna), along with booster.

## 2022-07-13 NOTE — PROCEDURE
[CRT-D] : Cardiac resynchronization therapy defibrillator [VVIR] : VVIR [Longevity: ___ months] : The estimated remaining battery life is [unfilled] months [Threshold Testing Performed] : Threshold testing was performed [Lead Imp:  ___ohms] : lead impedance was [unfilled] ohms [___V @] : [unfilled] V [___ ms] : [unfilled] ms [None] : none [Pace ___ %] : Pace [unfilled]% [de-identified] : bi v paced [de-identified] : st [facility] [de-identified] : 8398-70f [de-identified] : 12/11/2018 [de-identified] : [address] [de-identified] : gretchen lead present working well no evidence of insulation problem\par \par no therapies\par normal function\par \par

## 2022-07-15 NOTE — ASSESSMENT
[FreeTextEntry1] : INR today is 2.2 which is satisfactory. he takes warfarin 2.5 mg 2 days/week and 5 mg all other days. he will see Dr. Gresham in spine management and pain management evaluation in [place].I suspect that the spine is complex and complicated and suspect that the procedure may be increased risk especially while being monitored for anticoagulation. I suggested the following regimen Coumadin would be discontinued five days prior to the planned procedure and INR will be checked daily and when the INR goes below two then enoxaparin 60 mg two times per day will be instituted; the evening prior and the day of surgery enoxaparin would be held in anticipation of surgery .his wife was reluctant to give the dose subcutaneously although this will be evaluated\par \par Update 7/12/2022\par No current change in regimen aside from resumption of anticoagulation and reevaluation after surgical opinion obtained\par \par medical necessity\par this is a medium risk encounter based upon management of anticoagulants and review of upcoming consultation\par \par Discussed with Suzanne at the bedside

## 2022-07-15 NOTE — HISTORY OF PRESENT ILLNESS
[FreeTextEntry1] : Jj was recently hospitalized with a diarrheal type illness he was seen by ID service and was noted to have a markedly abnormal CAT scan. He is felt to be a high risk for epidural and he understands this risk  he is seeing the Dr. Gresham in [place] phone [phone] fax [phone] \par \par Update 7/12/2022\par Jj had transient relief from the procedure he will be seeing Dr. Cleveland on Monday for a surgical opinion a brain MRI showed ischemic changes still has residual pain would reevaluate after surgical opinion.  Jj seems to have tolerated the anticoagulation regimen better

## 2022-07-15 NOTE — CARDIOLOGY SUMMARY
[No Ischemia] : no Ischemia [Fixed Defect] : fixed defect [LVEF ___%] : LVEF [unfilled]% [Moderate] : moderate LV dysfunction [Enlarged] : enlarged LA size [___] : [unfilled] [___] : [unfilled]

## 2022-07-15 NOTE — PHYSICAL EXAM
[Well Developed] : well developed [Well Nourished] : well nourished [No Acute Distress] : no acute distress [Normal Venous Pressure] : normal venous pressure [No Carotid Bruit] : no carotid bruit [Normal S1, S2] : normal S1, S2 [No Murmur] : no murmur [No Rub] : no rub [No Gallop] : no gallop [Clear Lung Fields] : clear lung fields [Good Air Entry] : good air entry [No Respiratory Distress] : no respiratory distress  [Soft] : abdomen soft [Non Tender] : non-tender [No Masses/organomegaly] : no masses/organomegaly [Normal Bowel Sounds] : normal bowel sounds [Normal Gait] : normal gait [No Cyanosis] : no cyanosis [No Clubbing] : no clubbing [No Varicosities] : no varicosities [No Rash] : no rash [No Skin Lesions] : no skin lesions [Moves all extremities] : moves all extremities [No Focal Deficits] : no focal deficits [Normal Speech] : normal speech [Alert and Oriented] : alert and oriented [Normal memory] : normal memory [General Appearance - Well Developed] : well developed [Well Groomed] : well groomed [General Appearance - Well Nourished] : well nourished [General Appearance - In No Acute Distress] : no acute distress [Normal Conjunctiva] : the conjunctiva exhibited no abnormalities [Eyelids - No Xanthelasma] : the eyelids demonstrated no xanthelasmas [Normal Oral Mucosa] : normal oral mucosa [No Oral Pallor] : no oral pallor [No Oral Cyanosis] : no oral cyanosis [Normal Jugular Venous A Waves Present] : normal jugular venous A waves present [Normal Jugular Venous V Waves Present] : normal jugular venous V waves present [No Jugular Venous Perez A Waves] : no jugular venous perez A waves [Respiration, Rhythm And Depth] : normal respiratory rhythm and effort [Exaggerated Use Of Accessory Muscles For Inspiration] : no accessory muscle use [Auscultation Breath Sounds / Voice Sounds] : lungs were clear to auscultation bilaterally [Edema] : no peripheral edema present [Abdomen Soft] : soft [Abdomen Tenderness] : non-tender [Abdomen Mass (___ Cm)] : no abdominal mass palpated [Skin Color & Pigmentation] : normal skin color and pigmentation [] : no rash [No Venous Stasis] : no venous stasis [Skin Lesions] : no skin lesions [No Skin Ulcers] : no skin ulcer [No Xanthoma] : no  xanthoma was observed [Oriented To Time, Place, And Person] : oriented to person, place, and time [Affect] : the affect was normal [Mood] : the mood was normal [No Anxiety] : not feeling anxious [Left Infraclavicular] : left infraclavicular area [de-identified] : bilateral 3 to 4+ [FreeTextEntry1] : clean protruding wire but no erythema or sings of dermatologic erythema or compromise

## 2022-08-01 PROBLEM — L30.9 HAND DERMATITIS: Status: RESOLVED | Noted: 2022-04-01 | Resolved: 2022-01-01

## 2022-08-01 PROBLEM — K59.09 CHRONIC CONSTIPATION: Status: ACTIVE | Noted: 2022-01-01

## 2022-08-01 PROBLEM — Z86.19 HISTORY OF SEPSIS: Status: RESOLVED | Noted: 2022-05-13 | Resolved: 2022-01-01

## 2022-08-01 PROBLEM — M65.339 TRIGGER MIDDLE FINGER, UNSPECIFIED LATERALITY: Status: RESOLVED | Noted: 2018-06-15 | Resolved: 2022-01-01

## 2022-08-01 NOTE — COUNSELING
[Potential consequences of obesity discussed] : Potential consequences of obesity discussed [Benefits of weight loss discussed] : Benefits of weight loss discussed [Structured Weight Management Program suggested:] : Structured weight management program suggested [Encouraged to maintain food diary] : Encouraged to maintain food diary [Encouraged to increase physical activity] : Encouraged to increase physical activity [Encouraged to use exercise tracking device] : Encouraged to use exercise tracking device [FreeTextEntry1] : calories counting portion control

## 2022-08-01 NOTE — REVIEW OF SYSTEMS
[Fatigue] : fatigue [Lower Ext Edema] : lower extremity edema [Dyspnea on Exertion] : dyspnea on exertion [Constipation] : constipation [Joint Pain] : joint pain [Joint Stiffness] : joint stiffness [Muscle Pain] : muscle pain [Back Pain] : back pain [Unsteady Walk] : ataxia [Insomnia] : insomnia [Easy Bleeding] : easy bleeding [Negative] : ENT

## 2022-08-01 NOTE — HISTORY OF PRESENT ILLNESS
[de-identified] : 86 y/o PMHX CAD CHFrEF obesity Pre-DM2 OA CBP now getting spinal injections but notes pain relieve for a little but then comes back and is going for second injection cardio managing anticoagulation while getting procedure on Lovenox. Pt also notes cannot lay down and has bought all kinds of pillow to see if can rest and sleep better and not able to sleep on his back either  pt also c/o severe constipation not matter what notes has to push really hard to move bowels and stool is formed. Pt denies rectal bleeding\par pt also notes ended up hospitalized after was at home of his daughter at party and started feeling chills all over had abd pain and made a mess as per pt came in dx sepsis ID saw pt no abx. notes told likely viral.

## 2022-08-01 NOTE — PHYSICAL EXAM
[No Acute Distress] : no acute distress [Well-Appearing] : well-appearing [No JVD] : no jugular venous distention [Supple] : supple [Normal S1, S2] : normal S1 and S2 [Soft] : abdomen soft [Non Tender] : non-tender [de-identified] : obese in NAD [de-identified] : obese [de-identified] : pain LS Spine area  [de-identified] : 2+ leg swelling more bilateral feet

## 2022-08-01 NOTE — ASSESSMENT
[FreeTextEntry1] : increase water and fiber in diet\par d/w pt medications\par keep active despite back pain d/w pt and wife in room\par

## 2022-08-13 NOTE — ED PROVIDER NOTE - ATTENDING CONTRIBUTION TO CARE
This was shared visit with GALINA. I have reviewed and verified the documentation and independently performed the documented history, exam and mdm  86 y/o M with Afib on coumadin, CHF, PPM was sent to the ED by Dr. Razo for a rash x yesterday, he wanted patient INR and platelets checked.  Pt had a spinal epidural done 3 day ago, his coumadin was stopped for the procedure and he was on lovenox. Pt restarted his coumadin last night. He denies pain to the rash, f/c, CP, SOB, back pain, LE weakness or numbness, abd pain, n/v or all other complaints  SKIn: superficial ecchymotic skin lesion

## 2022-08-13 NOTE — ED PROVIDER NOTE - CLINICAL SUMMARY MEDICAL DECISION MAKING FREE TEXT BOX
pt p/w skin bruises since yesterday, was started on coumadin 2 days ago , on exam skin lesion looks like skin necrosis due coumadin , INR was sub therapeutic and platelets were 103

## 2022-08-13 NOTE — ED PROVIDER NOTE - MUSCULOSKELETAL, MLM
Spine appears normal, range of motion is not limited, no muscle or joint tenderness. +epidural injection sites are well appearing.

## 2022-08-13 NOTE — ED PROVIDER NOTE - SKIN, MLM
+small areas of coumadin skin necrosis +small areas of coumadin skin necrosis noted on the b/l forearms. No signs of infection noted

## 2022-08-13 NOTE — ED PROVIDER NOTE - NSFOLLOWUPINSTRUCTIONS_ED_ALL_ED_FT
What are the side effects of warfarin?    Too much warfarin can cause bleeding or hemorrhage in any part of the body, such as:  •Bleeding from the gums.      •Unexplained bruises or bruises that get larger.      •A nosebleed that is not easily stopped.      •Bleeding in the brain (hemorrhagic stroke).      •Coughing up or vomiting blood.      •Blood in the urine or stools.      Warfarin may also cause:  •Skin rash or irritations.      •Nausea that does not go away.      •Severe pain in the back or joints.      •Painful toes that turn blue or purple (purple toe syndrome).      •Painful ulcers that do not go away (skin necrosis).

## 2022-08-13 NOTE — ED PROVIDER NOTE - PATIENT PORTAL LINK FT
You can access the FollowMyHealth Patient Portal offered by [facility] by registering at the following website: [email or web address]. By joining [facility]’s FollowMyHealth portal, you will also be able to view your health information using other applications (apps) compatible with our system.

## 2022-08-13 NOTE — ED PROVIDER NOTE - OBJECTIVE STATEMENT
88 y/o M with Afib on coumadin, CHF, PPM was sent to the ED by Dr. Razo for a rash x yesterday, he wanted patient INR and platelets checked.  Pt had a spinal epidural done 3 day ago, his coumadin was stopped for the procedure and he was on lovenox. Pt restarted his coumadin last night. He denies pain to the rash, f/c, CP, SOB, back pain, LE weakness or numbness, abd pain, n/v or all other complaints

## 2022-08-13 NOTE — ED PROVIDER NOTE - NS ED ATTENDING STATEMENT MOD
I have seen and examined this patient and fully participated in the care of this patient as the teaching attending.  The service was shared with the GALINA.  I reviewed and verified the documentation and independently performed the documented:

## 2022-08-20 NOTE — CHART NOTE - NSCHARTNOTEFT_GEN_A_CORE
SW called pt to discuss and assist with follow up care.  Pt is an 86 y/o male presented to ED for abnormal lab result.  Pt did not respond to the phone call and worker left a call back # for further assistance, if needed.  As per HIM pt has appt for Oncology Dr. Kyra Moreno on 9/27/22 and primary care with Dr. Nova Clement on 11/1/22.

## 2022-11-01 PROBLEM — R26.81 GAIT INSTABILITY: Status: ACTIVE | Noted: 2021-07-22

## 2022-11-01 NOTE — PHYSICAL EXAM
[No Acute Distress] : no acute distress [Well-Appearing] : well-appearing [Normal] : no respiratory distress, lungs were clear to auscultation bilaterally and no accessory muscle use [Normal S1, S2] : normal S1 and S2 [Soft] : abdomen soft [Non Tender] : non-tender [Non-distended] : non-distended [de-identified] : obese HO in NAD [de-identified] : sameera masses noted [de-identified] : pain paraspinal muslce LS spine no skin lesions or rashes [de-identified] : 2+ leg edema symetric no calf tenderness [de-identified] : uses roller walker to ambulate

## 2022-11-01 NOTE — HISTORY OF PRESENT ILLNESS
[de-identified] : 88 y/o HTN CHF CAD COPD A Fib here for f/u also chronic back pain recent ablation now pain about 90 % better on spine but the muscle pain on back form awkward position getting in and out of bed Feels muscle are not strong and gets still pain on them. Pt now getting PT.  Pt worried developed a hernia in right inguinal area and states at times has pain

## 2022-11-01 NOTE — ASSESSMENT
[FreeTextEntry1] :  went over his results. His questions were answered and pt verbalized understanding.\par

## 2022-11-03 NOTE — REASON FOR VISIT
[Follow-Up Visit] : a follow-up visit for [Home] : at home, [unfilled] , at the time of the visit. [Medical Office: ([facility])___] : at the medical office located in  [Spouse] : spouse [Patient] : the patient [Self] : self [FreeTextEntry2] : pancytopenia

## 2022-11-03 NOTE — PHYSICAL EXAM
[Normal] : affect appropriate [de-identified] : breathing appeared unlabored [de-identified] : coloration appeared normal

## 2022-11-03 NOTE — ASSESSMENT
[FreeTextEntry1] : Lab work reviewed.\par Patient with history of pancytopenia with elevated MCV and increased monocyte percentage.\par 10/2016–Bone marrow biopsy and bone marrow aspirate showed a hypercellular bone marrow with trilineage hematopoiesis and maturation, increased iron stores.  Flow cytometry myeloid immunophenotypic findings showed no increase in myeloid immaturity.  The lymphocyte immunophenotypic findings showed T-cell predominance and rare B cells.  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  Platelet count appears to be fluctuating/acceptable at present. No overt bleeding reported currently. \par \par It remains possible the patient has an underlying myelodysplastic process/CMML.  \par Should hematologic scenario worsen/change, then further evaluation with follow-up BMB would be warranted should patient wish to pursue this.\par \par Patient to continue p.o. vitamin B12 supplement for h/o vitamin B-12 level less than 400, and p.o. folic acid (h/o slightly elevated homocysteine level).\par \par Patient and his wife were given the opportunity to ask questions.  Their questions have been answered to their apparent satisfaction at this time.

## 2022-11-03 NOTE — REVIEW OF SYSTEMS
DISPLAY PLAN FREE TEXT [Negative] : Allergic/Immunologic [Nosebleeds] : no nosebleeds [Fainting] : no fainting

## 2022-11-03 NOTE — HISTORY OF PRESENT ILLNESS
[de-identified] : 2012–Pancytopenia with macrocytosis.\par \par 10/2016–Bone marrow biopsy and bone marrow aspirate showed a hypercellular bone marrow with trilineage hematopoiesis and maturation, increased iron stores.  Flow cytometry myeloid immunophenotypic findings showed no increase in myeloid immaturity.  The lymphocyte immunophenotypic findings showed T-cell predominance and rare B cells.  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 Alcazar who monitored his CBC with differential.\par \par 7/2020–Peripheral blood flow cytometry lymphocyte immunophenotypic findings showed no diagnostic abnormalities with lymphopenia.  Myeloid immunophenotypic findings showed normal myeloid granularity with no increase in myeloid immaturity.  No significant absolute lymphocytosis, monocytosis or neutropenia.  MDS FISH panel normal. [de-identified] : No c/o bleeding. No fevers, cough. No abdominal pain. No current c/o CP.\par S/P nerve "ablation" for chronic LBP-getting PT, with some improvement in back pain.\par \par Has had COVID vaccines (Moderna), along with booster.

## 2022-11-28 PROBLEM — J06.9 VIRAL URI WITH COUGH: Status: RESOLVED | Noted: 2022-01-01 | Resolved: 2022-01-01

## 2022-11-28 NOTE — PHYSICAL EXAM
[No Acute Distress] : no acute distress [Well Nourished] : well nourished [Well Developed] : well developed [Well-Appearing] : well-appearing [Normal Sclera/Conjunctiva] : normal sclera/conjunctiva [PERRL] : pupils equal round and reactive to light [Normal TMs] : both tympanic membranes were normal [No JVD] : no jugular venous distention [No Lymphadenopathy] : no lymphadenopathy [No Respiratory Distress] : no respiratory distress  [No Accessory Muscle Use] : no accessory muscle use [Normal Percussion] : the chest was normal to percussion [Normal Rate] : normal rate  [No Murmur] : no murmur heard [Soft] : abdomen soft [Non Tender] : non-tender [No HSM] : no HSM [de-identified] : Obese [de-identified] : Few scattered rhonchi [de-identified] : 4+ pitting edema edema bilaterally patient states that this is somewhat worse than it has been but is usually quite pronounced

## 2022-11-28 NOTE — ASSESSMENT
[FreeTextEntry1] : Patient with cough and sore throat did have COVID immunizations has not been exposed to sick contact contacts that he knows he will be sent are given a COVID and flu nasal swab and also be sent for a chest x-ray due to his history of COPD follow-up after these results are available to us

## 2022-11-28 NOTE — REVIEW OF SYSTEMS
[Fever] : no fever [Chills] : no chills [Fatigue] : fatigue [Night Sweats] : no night sweats [Postnasal Drip] : postnasal drip [Sore Throat] : sore throat [Hoarseness] : hoarseness [Chest Pain] : no chest pain [Palpitations] : no palpitations [Orthopena] : no orthopnea [Shortness Of Breath] : no shortness of breath [Wheezing] : no wheezing [Cough] : cough [Dyspnea on Exertion] : not dyspnea on exertion [Negative] : Genitourinary

## 2022-11-28 NOTE — HISTORY OF PRESENT ILLNESS
[FreeTextEntry8] : Patient with a 4-day history of cough congestion and sore throat no fever or chills no shortness of breath the patient does have COPD along with many other issues including possible myelodysplastic syndrome he is in no severe distress no fever no shortness of breath

## 2022-12-01 PROBLEM — R05.3 PERSISTENT COUGH: Status: ACTIVE | Noted: 2022-01-01

## 2022-12-14 NOTE — PROCEDURE
[CRT-D] : Cardiac resynchronization therapy defibrillator [VVIR] : VVIR [Longevity: ___ months] : The estimated remaining battery life is [unfilled] months [Threshold Testing Performed] : Threshold testing was performed [Lead Imp:  ___ohms] : lead impedance was [unfilled] ohms [___V @] : [unfilled] V [___ ms] : [unfilled] ms [None] : none [Pace ___ %] : Pace [unfilled]% [de-identified] : bi v paced [de-identified] : st [facility] [de-identified] : 2865-23x [de-identified] : 12/11/2018 [de-identified] : [address]10 [de-identified] : riata lead present working well no evidence of insulation problem\par 3 nsvt 12/1 39 beats at 157 resolved spontaneously\par no therapies\par normal function\par \par

## 2022-12-18 PROBLEM — R05.9 COUGH: Status: ACTIVE | Noted: 2017-12-27

## 2022-12-18 NOTE — HISTORY OF PRESENT ILLNESS
[FreeTextEntry8] : 88 year old male presents for evaluation of a dry cough that has been present since Thanksgiving. He is not expectorating phlegm but notes that his back is most sore while coughing as he previously had spinal ablation surgery. He has taken benzonatate 100 mg daily with mild cough relief. He also notes nasal stuffiness. PMH of obstructive lung disease, uses inhalers routinely. He has an appointment with his cardiologist on 12/29/22.

## 2022-12-18 NOTE — REVIEW OF SYSTEMS
[Postnasal Drip] : postnasal drip [Nasal Discharge] : nasal discharge [Shortness Of Breath] : no shortness of breath [Wheezing] : no wheezing [Cough] : cough [Dyspnea on Exertion] : not dyspnea on exertion [Negative] : Cardiovascular

## 2022-12-18 NOTE — PHYSICAL EXAM
[Normal Outer Ear/Nose] : the outer ears and nose were normal in appearance [Normal Oropharynx] : the oropharynx was normal [Normal TMs] : both tympanic membranes were normal [Normal] : normal rate, regular rhythm, normal S1 and S2 and no murmur heard [de-identified] : bilateral nasal turbinate swelling without purulence or erythema

## 2022-12-25 NOTE — PHYSICAL EXAM
[Well Developed] : well developed [Well Nourished] : well nourished [No Acute Distress] : no acute distress [Normal Venous Pressure] : normal venous pressure [No Carotid Bruit] : no carotid bruit [Normal S1, S2] : normal S1, S2 [No Murmur] : no murmur [No Rub] : no rub [No Gallop] : no gallop [Clear Lung Fields] : clear lung fields [Good Air Entry] : good air entry [No Respiratory Distress] : no respiratory distress  [Soft] : abdomen soft [Non Tender] : non-tender [No Masses/organomegaly] : no masses/organomegaly [Normal Bowel Sounds] : normal bowel sounds [Normal Gait] : normal gait [No Cyanosis] : no cyanosis [No Clubbing] : no clubbing [No Varicosities] : no varicosities [No Rash] : no rash [No Skin Lesions] : no skin lesions [Moves all extremities] : moves all extremities [No Focal Deficits] : no focal deficits [Normal Speech] : normal speech [Alert and Oriented] : alert and oriented [Normal memory] : normal memory [General Appearance - Well Developed] : well developed [Well Groomed] : well groomed [General Appearance - Well Nourished] : well nourished [General Appearance - In No Acute Distress] : no acute distress [Normal Conjunctiva] : the conjunctiva exhibited no abnormalities [Eyelids - No Xanthelasma] : the eyelids demonstrated no xanthelasmas [Normal Oral Mucosa] : normal oral mucosa [No Oral Pallor] : no oral pallor [No Oral Cyanosis] : no oral cyanosis [Normal Jugular Venous A Waves Present] : normal jugular venous A waves present [Normal Jugular Venous V Waves Present] : normal jugular venous V waves present [No Jugular Venous Perez A Waves] : no jugular venous perez A waves [Respiration, Rhythm And Depth] : normal respiratory rhythm and effort [Exaggerated Use Of Accessory Muscles For Inspiration] : no accessory muscle use [Auscultation Breath Sounds / Voice Sounds] : lungs were clear to auscultation bilaterally [Edema] : no peripheral edema present [Abdomen Soft] : soft [Abdomen Tenderness] : non-tender [Abdomen Mass (___ Cm)] : no abdominal mass palpated [Skin Color & Pigmentation] : normal skin color and pigmentation [No Venous Stasis] : no venous stasis [] : no rash [Skin Lesions] : no skin lesions [No Skin Ulcers] : no skin ulcer [No Xanthoma] : no  xanthoma was observed [Oriented To Time, Place, And Person] : oriented to person, place, and time [Affect] : the affect was normal [Mood] : the mood was normal [No Anxiety] : not feeling anxious [Left Infraclavicular] : left infraclavicular area [de-identified] : bilateral 3 to 4+ [FreeTextEntry1] : clean protruding wire but no erythema or sings of dermatologic erythema or compromise

## 2022-12-25 NOTE — DISCUSSION/SUMMARY
[Procedure Intermediate Risk] : the procedure risk is intermediate [Patient Intermediate Risk] : the patient is an intermediate risk [FreeTextEntry1] : for procedure anesthesia  8/11/22\par last dose Coumadin 8/5/22\par daily INR 8/6-/8/10  (except 8/7)\par begin Lovenox injection 2 times per day AM and PM when INR <2\par no Lovenox PM 8/10 or AM 8/11 begin Coumadin when OK from anesthesia standpoint.

## 2022-12-25 NOTE — ASSESSMENT
[FreeTextEntry1] : INR today is 2.2 which is satisfactory. he takes warfarin 2.5 mg 2 days/week and 5 mg all other days. he will see Dr. Gresham in spine management and pain management evaluation in [place].I suspect that the spine is complex and complicated and suspect that the procedure may be increased risk especially while being monitored for anticoagulation. I suggested the following regimen Coumadin would be discontinued five days prior to the planned procedure and INR will be checked daily and when the INR goes below two then enoxaparin 60 mg two times per day will be instituted; the evening prior and the day of surgery enoxaparin would be held in anticipation of surgery .his wife was reluctant to give the dose subcutaneously although this will be evaluated\par \par Update 7/12/2022\par No current change in regimen aside from resumption of anticoagulation and reevaluation after surgical opinion obtained\par \par addendum 12/2/22\par we will proceed with nuclear stress testing in order to assess the abnormalities seen on echocardiography and to exclude progressive corner disease\par \par medical necessity\par this is a high risk encounter based upon review of a prior echocardiogram and the recommendation for a nuclear stress test the setting of advanced cardiovascular disease on anticoagulants and high-risk medications\par \par Discussed with Suzanne at the bedside

## 2022-12-25 NOTE — HISTORY OF PRESENT ILLNESS
[FreeTextEntry1] : Jj was recently hospitalized with a diarrheal type illness he was seen by ID service and was noted to have a markedly abnormal CAT scan. He is felt to be a high risk for epidural and he understands this risk  he is seeing the Dr. Gresham in [place] phone [phone] fax [phone] \par \par Update 7/12/2022\par Jj had transient relief from the procedure he will be seeing Dr. Cleveland on Monday for a surgical opinion a brain MRI showed ischemic changes still has residual pain would reevaluate after surgical opinion.  Jj seems to have tolerated the anticoagulation regimen better\par \par Update 12/2/22\par Jj underwent an echocardiogram recently which demonstrates an inferior wall motion abnormality and concerns were raised regarding progressive corner disease given a prior history of a mesenteric stent and atherosclerotic cardiovascular disease in the setting of some dyspnea

## 2023-01-01 ENCOUNTER — LABORATORY RESULT (OUTPATIENT)
Age: 88
End: 2023-01-01

## 2023-01-01 ENCOUNTER — APPOINTMENT (OUTPATIENT)
Dept: FAMILY MEDICINE | Facility: CLINIC | Age: 88
End: 2023-01-01

## 2023-01-01 ENCOUNTER — OUTPATIENT (OUTPATIENT)
Dept: OUTPATIENT SERVICES | Facility: HOSPITAL | Age: 88
LOS: 1 days | End: 2023-01-01
Payer: MEDICARE

## 2023-01-01 ENCOUNTER — RX RENEWAL (OUTPATIENT)
Age: 88
End: 2023-01-01

## 2023-01-01 ENCOUNTER — NON-APPOINTMENT (OUTPATIENT)
Age: 88
End: 2023-01-01

## 2023-01-01 ENCOUNTER — APPOINTMENT (OUTPATIENT)
Dept: PAIN MANAGEMENT | Facility: CLINIC | Age: 88
End: 2023-01-01
Payer: MEDICARE

## 2023-01-01 ENCOUNTER — APPOINTMENT (OUTPATIENT)
Dept: CARDIOLOGY | Facility: CLINIC | Age: 88
End: 2023-01-01
Payer: MEDICARE

## 2023-01-01 ENCOUNTER — EMERGENCY (EMERGENCY)
Facility: HOSPITAL | Age: 88
LOS: 1 days | Discharge: HOME | End: 2023-01-01
Attending: STUDENT IN AN ORGANIZED HEALTH CARE EDUCATION/TRAINING PROGRAM | Admitting: STUDENT IN AN ORGANIZED HEALTH CARE EDUCATION/TRAINING PROGRAM
Payer: MEDICARE

## 2023-01-01 ENCOUNTER — OUTPATIENT (OUTPATIENT)
Dept: OUTPATIENT SERVICES | Facility: HOSPITAL | Age: 88
LOS: 1 days | Discharge: HOME | End: 2023-01-01

## 2023-01-01 ENCOUNTER — APPOINTMENT (OUTPATIENT)
Dept: CT IMAGING | Facility: CLINIC | Age: 88
End: 2023-01-01
Payer: MEDICARE

## 2023-01-01 ENCOUNTER — APPOINTMENT (OUTPATIENT)
Dept: ELECTROPHYSIOLOGY | Facility: CLINIC | Age: 88
End: 2023-01-01
Payer: MEDICARE

## 2023-01-01 ENCOUNTER — APPOINTMENT (OUTPATIENT)
Dept: HEMATOLOGY ONCOLOGY | Facility: CLINIC | Age: 88
End: 2023-01-01
Payer: MEDICARE

## 2023-01-01 ENCOUNTER — EMERGENCY (EMERGENCY)
Facility: HOSPITAL | Age: 88
LOS: 1 days | End: 2023-01-01
Attending: INTERNAL MEDICINE | Admitting: INTERNAL MEDICINE
Payer: MEDICARE

## 2023-01-01 ENCOUNTER — APPOINTMENT (OUTPATIENT)
Dept: ORTHOPEDIC SURGERY | Facility: HOSPITAL | Age: 88
End: 2023-01-01

## 2023-01-01 ENCOUNTER — APPOINTMENT (OUTPATIENT)
Dept: CT IMAGING | Facility: HOSPITAL | Age: 88
End: 2023-01-01
Payer: MEDICARE

## 2023-01-01 ENCOUNTER — APPOINTMENT (OUTPATIENT)
Dept: ORTHOPEDIC SURGERY | Facility: CLINIC | Age: 88
End: 2023-01-01
Payer: MEDICARE

## 2023-01-01 ENCOUNTER — APPOINTMENT (OUTPATIENT)
Dept: FAMILY MEDICINE | Facility: CLINIC | Age: 88
End: 2023-01-01
Payer: MEDICARE

## 2023-01-01 ENCOUNTER — EMERGENCY (EMERGENCY)
Facility: HOSPITAL | Age: 88
LOS: 1 days | Discharge: HOME | End: 2023-01-01
Attending: INTERNAL MEDICINE | Admitting: INTERNAL MEDICINE
Payer: MEDICARE

## 2023-01-01 VITALS — BODY MASS INDEX: 34.65 KG/M2 | HEIGHT: 74 IN | WEIGHT: 270 LBS

## 2023-01-01 VITALS
HEART RATE: 96 BPM | BODY MASS INDEX: 32.74 KG/M2 | DIASTOLIC BLOOD PRESSURE: 84 MMHG | WEIGHT: 255 LBS | SYSTOLIC BLOOD PRESSURE: 124 MMHG | RESPIRATION RATE: 16 BRPM | OXYGEN SATURATION: 96 % | TEMPERATURE: 97.5 F

## 2023-01-01 VITALS
HEART RATE: 60 BPM | OXYGEN SATURATION: 97 % | TEMPERATURE: 99 F | DIASTOLIC BLOOD PRESSURE: 77 MMHG | RESPIRATION RATE: 16 BRPM | SYSTOLIC BLOOD PRESSURE: 133 MMHG

## 2023-01-01 VITALS
DIASTOLIC BLOOD PRESSURE: 66 MMHG | OXYGEN SATURATION: 96 % | SYSTOLIC BLOOD PRESSURE: 123 MMHG | TEMPERATURE: 97.5 F | WEIGHT: 271 LBS | BODY MASS INDEX: 34.78 KG/M2 | RESPIRATION RATE: 17 BRPM | HEART RATE: 78 BPM | HEIGHT: 74 IN

## 2023-01-01 VITALS
RESPIRATION RATE: 17 BRPM | OXYGEN SATURATION: 95 % | HEART RATE: 60 BPM | HEIGHT: 74 IN | SYSTOLIC BLOOD PRESSURE: 152 MMHG | BODY MASS INDEX: 35.42 KG/M2 | DIASTOLIC BLOOD PRESSURE: 80 MMHG | TEMPERATURE: 96.3 F | WEIGHT: 276 LBS

## 2023-01-01 VITALS
SYSTOLIC BLOOD PRESSURE: 148 MMHG | HEIGHT: 74 IN | HEART RATE: 60 BPM | BODY MASS INDEX: 34.65 KG/M2 | WEIGHT: 270 LBS | DIASTOLIC BLOOD PRESSURE: 80 MMHG

## 2023-01-01 VITALS
DIASTOLIC BLOOD PRESSURE: 75 MMHG | SYSTOLIC BLOOD PRESSURE: 125 MMHG | HEART RATE: 75 BPM | TEMPERATURE: 98.3 F | RESPIRATION RATE: 16 BRPM | HEIGHT: 74 IN | OXYGEN SATURATION: 99 %

## 2023-01-01 VITALS
HEIGHT: 70 IN | WEIGHT: 255.96 LBS | SYSTOLIC BLOOD PRESSURE: 138 MMHG | TEMPERATURE: 98 F | OXYGEN SATURATION: 96 % | HEART RATE: 98 BPM | DIASTOLIC BLOOD PRESSURE: 86 MMHG | RESPIRATION RATE: 18 BRPM

## 2023-01-01 VITALS
HEIGHT: 74 IN | RESPIRATION RATE: 17 BRPM | SYSTOLIC BLOOD PRESSURE: 124 MMHG | HEART RATE: 62 BPM | OXYGEN SATURATION: 98 % | DIASTOLIC BLOOD PRESSURE: 82 MMHG

## 2023-01-01 VITALS
RESPIRATION RATE: 16 BRPM | HEIGHT: 70 IN | SYSTOLIC BLOOD PRESSURE: 112 MMHG | WEIGHT: 270.07 LBS | HEART RATE: 56 BPM | OXYGEN SATURATION: 98 % | TEMPERATURE: 98 F | DIASTOLIC BLOOD PRESSURE: 64 MMHG

## 2023-01-01 DIAGNOSIS — Z98.89 OTHER SPECIFIED POSTPROCEDURAL STATES: Chronic | ICD-10-CM

## 2023-01-01 DIAGNOSIS — I73.9 PERIPHERAL VASCULAR DISEASE, UNSPECIFIED: Chronic | ICD-10-CM

## 2023-01-01 DIAGNOSIS — M25.78 OTHER INTERVERTEBRAL DISC DEGENERATION, THORACIC REGION: ICD-10-CM

## 2023-01-01 DIAGNOSIS — I25.10 ATHEROSCLEROTIC HEART DISEASE OF NATIVE CORONARY ARTERY W/OUT ANGINA PECTORIS: ICD-10-CM

## 2023-01-01 DIAGNOSIS — M54.50 LOW BACK PAIN, UNSPECIFIED: ICD-10-CM

## 2023-01-01 DIAGNOSIS — D64.9 ANEMIA, UNSPECIFIED: ICD-10-CM

## 2023-01-01 DIAGNOSIS — Z95.810 PRESENCE OF AUTOMATIC (IMPLANTABLE) CARDIAC DEFIBRILLATOR: ICD-10-CM

## 2023-01-01 DIAGNOSIS — I10 ESSENTIAL (PRIMARY) HYPERTENSION: ICD-10-CM

## 2023-01-01 DIAGNOSIS — Z09 ENCOUNTER FOR FOLLOW-UP EXAMINATION AFTER COMPLETED TREATMENT FOR CONDITIONS OTHER THAN MALIGNANT NEOPLASM: ICD-10-CM

## 2023-01-01 DIAGNOSIS — D61.818 OTHER PANCYTOPENIA: ICD-10-CM

## 2023-01-01 DIAGNOSIS — M54.16 RADICULOPATHY, LUMBAR REGION: ICD-10-CM

## 2023-01-01 DIAGNOSIS — M51.37 OTHER INTERVERTEBRAL DISC DEGENERATION, LUMBOSACRAL REGION: ICD-10-CM

## 2023-01-01 DIAGNOSIS — J44.9 CHRONIC OBSTRUCTIVE PULMONARY DISEASE, UNSPECIFIED: Chronic | ICD-10-CM

## 2023-01-01 DIAGNOSIS — E03.9 HYPOTHYROIDISM, UNSPECIFIED: ICD-10-CM

## 2023-01-01 DIAGNOSIS — D72.828 OTHER ELEVATED WHITE BLOOD CELL COUNT: ICD-10-CM

## 2023-01-01 DIAGNOSIS — Z79.01 LONG TERM (CURRENT) USE OF ANTICOAGULANTS: ICD-10-CM

## 2023-01-01 DIAGNOSIS — Z86.79 PERSONAL HISTORY OF OTHER DISEASES OF THE CIRCULATORY SYSTEM: ICD-10-CM

## 2023-01-01 DIAGNOSIS — M51.34 OTHER INTERVERTEBRAL DISC DEGENERATION, THORACIC REGION: ICD-10-CM

## 2023-01-01 DIAGNOSIS — D69.6 THROMBOCYTOPENIA, UNSPECIFIED: ICD-10-CM

## 2023-01-01 DIAGNOSIS — G89.29 DORSALGIA, UNSPECIFIED: ICD-10-CM

## 2023-01-01 DIAGNOSIS — M54.9 DORSALGIA, UNSPECIFIED: ICD-10-CM

## 2023-01-01 DIAGNOSIS — Z00.00 ENCOUNTER FOR GENERAL ADULT MEDICAL EXAMINATION WITHOUT ABNORMAL FINDINGS: ICD-10-CM

## 2023-01-01 DIAGNOSIS — I25.10 ATHEROSCLEROTIC HEART DISEASE OF NATIVE CORONARY ARTERY WITHOUT ANGINA PECTORIS: ICD-10-CM

## 2023-01-01 DIAGNOSIS — M54.6 LOW BACK PAIN, UNSPECIFIED: ICD-10-CM

## 2023-01-01 DIAGNOSIS — I42.0 DILATED CARDIOMYOPATHY: ICD-10-CM

## 2023-01-01 DIAGNOSIS — Z00.8 ENCOUNTER FOR OTHER GENERAL EXAMINATION: ICD-10-CM

## 2023-01-01 LAB
ALBUMIN SERPL ELPH-MCNC: 3.1 G/DL — LOW (ref 3.3–5)
ALBUMIN SERPL ELPH-MCNC: 3.7 G/DL — SIGNIFICANT CHANGE UP (ref 3.3–5)
ALBUMIN SERPL ELPH-MCNC: 4.6 G/DL
ALP BLD-CCNC: 65 U/L
ALP SERPL-CCNC: 63 U/L — SIGNIFICANT CHANGE UP (ref 40–120)
ALP SERPL-CCNC: 68 U/L — SIGNIFICANT CHANGE UP (ref 40–120)
ALT FLD-CCNC: 19 U/L — SIGNIFICANT CHANGE UP (ref 10–45)
ALT FLD-CCNC: 206 U/L — HIGH (ref 10–45)
ALT SERPL-CCNC: 14 U/L
ANION GAP SERPL CALC-SCNC: 14 MMOL/L
ANION GAP SERPL CALC-SCNC: 23 MMOL/L — HIGH (ref 5–17)
ANION GAP SERPL CALC-SCNC: 7 MMOL/L — SIGNIFICANT CHANGE UP (ref 5–17)
APTT BLD: 37.8 SEC — HIGH (ref 27.5–35.5)
APTT BLD: 44 SEC — HIGH (ref 27.5–35.5)
AST SERPL-CCNC: 166 U/L — HIGH (ref 10–40)
AST SERPL-CCNC: 18 U/L
AST SERPL-CCNC: 32 U/L — SIGNIFICANT CHANGE UP (ref 10–40)
BASOPHILS # BLD AUTO: 0 K/UL — SIGNIFICANT CHANGE UP (ref 0–0.2)
BASOPHILS # BLD AUTO: 0.01 K/UL — SIGNIFICANT CHANGE UP (ref 0–0.2)
BASOPHILS NFR BLD AUTO: 0 % — SIGNIFICANT CHANGE UP (ref 0–2)
BASOPHILS NFR BLD AUTO: 0.2 % — SIGNIFICANT CHANGE UP (ref 0–2)
BILIRUB SERPL-MCNC: 0.6 MG/DL — SIGNIFICANT CHANGE UP (ref 0.2–1.2)
BILIRUB SERPL-MCNC: 0.7 MG/DL
BILIRUB SERPL-MCNC: 1.4 MG/DL — HIGH (ref 0.2–1.2)
BLD GP AB SCN SERPL QL: SIGNIFICANT CHANGE UP
BUN SERPL-MCNC: 19 MG/DL — SIGNIFICANT CHANGE UP (ref 7–23)
BUN SERPL-MCNC: 21 MG/DL
BUN SERPL-MCNC: 58 MG/DL — HIGH (ref 7–23)
CALCIUM SERPL-MCNC: 8.6 MG/DL — SIGNIFICANT CHANGE UP (ref 8.4–10.5)
CALCIUM SERPL-MCNC: 9.1 MG/DL — SIGNIFICANT CHANGE UP (ref 8.4–10.5)
CALCIUM SERPL-MCNC: 9.7 MG/DL
CHLORIDE SERPL-SCNC: 100 MMOL/L — SIGNIFICANT CHANGE UP (ref 96–108)
CHLORIDE SERPL-SCNC: 102 MMOL/L
CHLORIDE SERPL-SCNC: 97 MMOL/L — SIGNIFICANT CHANGE UP (ref 96–108)
CHOLEST SERPL-MCNC: 117 MG/DL
CO2 SERPL-SCNC: 16 MMOL/L — LOW (ref 22–31)
CO2 SERPL-SCNC: 28 MMOL/L
CO2 SERPL-SCNC: 30 MMOL/L — SIGNIFICANT CHANGE UP (ref 22–31)
CREAT SERPL-MCNC: 1.47 MG/DL
CREAT SERPL-MCNC: 1.47 MG/DL — HIGH (ref 0.5–1.3)
CREAT SERPL-MCNC: 2.6 MG/DL — HIGH (ref 0.5–1.3)
EGFR: 23 ML/MIN/1.73M2 — LOW
EGFR: 45 ML/MIN/1.73M2 — LOW
EGFR: 46 ML/MIN/1.73M2
EOSINOPHIL # BLD AUTO: 0 K/UL — SIGNIFICANT CHANGE UP (ref 0–0.5)
EOSINOPHIL # BLD AUTO: 0.04 K/UL — SIGNIFICANT CHANGE UP (ref 0–0.5)
EOSINOPHIL NFR BLD AUTO: 0 % — SIGNIFICANT CHANGE UP (ref 0–6)
EOSINOPHIL NFR BLD AUTO: 0.9 % — SIGNIFICANT CHANGE UP (ref 0–6)
FERRITIN SERPL-MCNC: 173 NG/ML
FOLATE SERPL-MCNC: >20 NG/ML
GLUCOSE SERPL-MCNC: 126 MG/DL — HIGH (ref 70–99)
GLUCOSE SERPL-MCNC: 159 MG/DL — HIGH (ref 70–99)
GLUCOSE SERPL-MCNC: 97 MG/DL
HCT VFR BLD CALC: 35 % — LOW (ref 39–50)
HCT VFR BLD CALC: 37 % — LOW (ref 39–50)
HDLC SERPL-MCNC: 40 MG/DL
HGB BLD-MCNC: 11.2 G/DL — LOW (ref 13–17)
HGB BLD-MCNC: 11.3 G/DL — LOW (ref 13–17)
IMM GRANULOCYTES NFR BLD AUTO: 2 % — HIGH (ref 0–0.9)
INR BLD: 1.77 RATIO — HIGH (ref 0.88–1.16)
INR BLD: 3.32 RATIO — HIGH (ref 0.88–1.16)
INR BLD: 6.2 RATIO — CRITICAL HIGH (ref 0.88–1.16)
INR PPP: 2.3
IRON SATN MFR SERPL: 21 %
IRON SERPL-MCNC: 55 UG/DL
LDLC SERPL CALC-MCNC: 61 MG/DL
LIDOCAIN IGE QN: 227 U/L — SIGNIFICANT CHANGE UP (ref 73–393)
LYMPHOCYTES # BLD AUTO: 0.88 K/UL — LOW (ref 1–3.3)
LYMPHOCYTES # BLD AUTO: 19.6 % — SIGNIFICANT CHANGE UP (ref 13–44)
LYMPHOCYTES # BLD AUTO: 27 % — SIGNIFICANT CHANGE UP (ref 13–44)
LYMPHOCYTES # BLD AUTO: 3.32 K/UL — HIGH (ref 1–3.3)
MACROCYTES BLD QL: SIGNIFICANT CHANGE UP
MCHC RBC-ENTMCNC: 30.5 GM/DL — LOW (ref 32–36)
MCHC RBC-ENTMCNC: 32 GM/DL — SIGNIFICANT CHANGE UP (ref 32–36)
MCHC RBC-ENTMCNC: 32.4 PG — SIGNIFICANT CHANGE UP (ref 27–34)
MCHC RBC-ENTMCNC: 33 PG — SIGNIFICANT CHANGE UP (ref 27–34)
MCV RBC AUTO: 101.2 FL — HIGH (ref 80–100)
MCV RBC AUTO: 108.2 FL — HIGH (ref 80–100)
METAMYELOCYTES # FLD: 1 % — HIGH (ref 0–0)
MONOCYTES # BLD AUTO: 0.82 K/UL — SIGNIFICANT CHANGE UP (ref 0–0.9)
MONOCYTES # BLD AUTO: 1.72 K/UL — HIGH (ref 0–0.9)
MONOCYTES NFR BLD AUTO: 14 % — SIGNIFICANT CHANGE UP (ref 2–14)
MONOCYTES NFR BLD AUTO: 18.3 % — HIGH (ref 2–14)
MYELOCYTES NFR BLD: 4 % — HIGH (ref 0–0)
NEUTROPHILS # BLD AUTO: 2.64 K/UL — SIGNIFICANT CHANGE UP (ref 1.8–7.4)
NEUTROPHILS # BLD AUTO: 6.51 K/UL — SIGNIFICANT CHANGE UP (ref 1.8–7.4)
NEUTROPHILS NFR BLD AUTO: 51 % — SIGNIFICANT CHANGE UP (ref 43–77)
NEUTROPHILS NFR BLD AUTO: 59 % — SIGNIFICANT CHANGE UP (ref 43–77)
NONHDLC SERPL-MCNC: 78 MG/DL
NRBC # BLD: 0 /100 WBCS — SIGNIFICANT CHANGE UP (ref 0–0)
NRBC # BLD: 1 /100 — HIGH (ref 0–0)
OB PNL STL: NEGATIVE — SIGNIFICANT CHANGE UP
PLAT MORPH BLD: NORMAL — SIGNIFICANT CHANGE UP
PLATELET # BLD AUTO: 109 K/UL — LOW (ref 150–400)
PLATELET # BLD AUTO: 36 K/UL — LOW (ref 150–400)
POIKILOCYTOSIS BLD QL AUTO: SLIGHT — SIGNIFICANT CHANGE UP
POLYCHROMASIA BLD QL SMEAR: SLIGHT — SIGNIFICANT CHANGE UP
POTASSIUM SERPL-MCNC: 3.6 MMOL/L — SIGNIFICANT CHANGE UP (ref 3.5–5.3)
POTASSIUM SERPL-MCNC: 5.3 MMOL/L — SIGNIFICANT CHANGE UP (ref 3.5–5.3)
POTASSIUM SERPL-SCNC: 3.6 MMOL/L — SIGNIFICANT CHANGE UP (ref 3.5–5.3)
POTASSIUM SERPL-SCNC: 4.5 MMOL/L
POTASSIUM SERPL-SCNC: 5.3 MMOL/L — SIGNIFICANT CHANGE UP (ref 3.5–5.3)
PROT SERPL-MCNC: 6 G/DL — SIGNIFICANT CHANGE UP (ref 6–8.3)
PROT SERPL-MCNC: 7.1 G/DL
PROT SERPL-MCNC: 7.3 G/DL — SIGNIFICANT CHANGE UP (ref 6–8.3)
PROTHROM AB SERPL-ACNC: 20.7 SEC — HIGH (ref 10.5–13.4)
PROTHROM AB SERPL-ACNC: 39 SEC — HIGH (ref 10.5–13.4)
PROTHROM AB SERPL-ACNC: 73.2 SEC — HIGH (ref 10.5–13.4)
RBC # BLD: 3.42 M/UL — LOW (ref 4.2–5.8)
RBC # BLD: 3.46 M/UL — LOW (ref 4.2–5.8)
RBC # BLD: 3.54 M/UL
RBC # FLD: 13.2 % — SIGNIFICANT CHANGE UP (ref 10.3–14.5)
RBC # FLD: 14.4 % — SIGNIFICANT CHANGE UP (ref 10.3–14.5)
RBC BLD AUTO: ABNORMAL
RETICS # AUTO: 1.5 %
RETICS AGGREG/RBC NFR: 51.3 K/UL
SODIUM SERPL-SCNC: 136 MMOL/L — SIGNIFICANT CHANGE UP (ref 135–145)
SODIUM SERPL-SCNC: 137 MMOL/L — SIGNIFICANT CHANGE UP (ref 135–145)
SODIUM SERPL-SCNC: 144 MMOL/L
TIBC SERPL-MCNC: 254 UG/DL
TRIGL SERPL-MCNC: 83 MG/DL
TROPONIN I, HIGH SENSITIVITY RESULT: 5926.5 NG/L — HIGH
UIBC SERPL-MCNC: 200 UG/DL
VARIANT LYMPHS # BLD: 1 % — SIGNIFICANT CHANGE UP (ref 0–6)
VIT B12 SERPL-MCNC: 1944 PG/ML
WBC # BLD: 12.29 K/UL — HIGH (ref 3.8–10.5)
WBC # BLD: 4.48 K/UL — SIGNIFICANT CHANGE UP (ref 3.8–10.5)
WBC # FLD AUTO: 12.29 K/UL — HIGH (ref 3.8–10.5)
WBC # FLD AUTO: 4.48 K/UL — SIGNIFICANT CHANGE UP (ref 3.8–10.5)

## 2023-01-01 PROCEDURE — 72131 CT LUMBAR SPINE W/O DYE: CPT | Mod: 26,MH

## 2023-01-01 PROCEDURE — 96360 HYDRATION IV INFUSION INIT: CPT | Mod: XU

## 2023-01-01 PROCEDURE — 93010 ELECTROCARDIOGRAM REPORT: CPT

## 2023-01-01 PROCEDURE — 99213 OFFICE O/P EST LOW 20 MIN: CPT | Mod: 95

## 2023-01-01 PROCEDURE — 82272 OCCULT BLD FECES 1-3 TESTS: CPT

## 2023-01-01 PROCEDURE — 93000 ELECTROCARDIOGRAM COMPLETE: CPT

## 2023-01-01 PROCEDURE — 75574 CT ANGIO HRT W/3D IMAGE: CPT

## 2023-01-01 PROCEDURE — 84484 ASSAY OF TROPONIN QUANT: CPT

## 2023-01-01 PROCEDURE — 99213 OFFICE O/P EST LOW 20 MIN: CPT

## 2023-01-01 PROCEDURE — 85610 PROTHROMBIN TIME: CPT

## 2023-01-01 PROCEDURE — 99214 OFFICE O/P EST MOD 30 MIN: CPT | Mod: 25

## 2023-01-01 PROCEDURE — 85730 THROMBOPLASTIN TIME PARTIAL: CPT

## 2023-01-01 PROCEDURE — 86850 RBC ANTIBODY SCREEN: CPT

## 2023-01-01 PROCEDURE — 82962 GLUCOSE BLOOD TEST: CPT

## 2023-01-01 PROCEDURE — 99214 OFFICE O/P EST MOD 30 MIN: CPT

## 2023-01-01 PROCEDURE — 80053 COMPREHEN METABOLIC PANEL: CPT

## 2023-01-01 PROCEDURE — 93289 INTERROG DEVICE EVAL HEART: CPT

## 2023-01-01 PROCEDURE — 92950 HEART/LUNG RESUSCITATION CPR: CPT

## 2023-01-01 PROCEDURE — 99285 EMERGENCY DEPT VISIT HI MDM: CPT | Mod: 25

## 2023-01-01 PROCEDURE — 85610 PROTHROMBIN TIME: CPT | Mod: QW

## 2023-01-01 PROCEDURE — 85025 COMPLETE CBC W/AUTO DIFF WBC: CPT

## 2023-01-01 PROCEDURE — 93295 DEV INTERROG REMOTE 1/2/MLT: CPT

## 2023-01-01 PROCEDURE — 93000 ELECTROCARDIOGRAM COMPLETE: CPT | Mod: 59

## 2023-01-01 PROCEDURE — 86901 BLOOD TYPING SEROLOGIC RH(D): CPT

## 2023-01-01 PROCEDURE — 36415 COLL VENOUS BLD VENIPUNCTURE: CPT

## 2023-01-01 PROCEDURE — 85049 AUTOMATED PLATELET COUNT: CPT

## 2023-01-01 PROCEDURE — 99211 OFF/OP EST MAY X REQ PHY/QHP: CPT | Mod: 25

## 2023-01-01 PROCEDURE — 93005 ELECTROCARDIOGRAM TRACING: CPT

## 2023-01-01 PROCEDURE — 99285 EMERGENCY DEPT VISIT HI MDM: CPT | Mod: FS

## 2023-01-01 PROCEDURE — 86900 BLOOD TYPING SEROLOGIC ABO: CPT

## 2023-01-01 PROCEDURE — 74177 CT ABD & PELVIS W/CONTRAST: CPT | Mod: MA

## 2023-01-01 PROCEDURE — 99283 EMERGENCY DEPT VISIT LOW MDM: CPT

## 2023-01-01 PROCEDURE — 74177 CT ABD & PELVIS W/CONTRAST: CPT | Mod: 26,MA

## 2023-01-01 PROCEDURE — 93296 REM INTERROG EVL PM/IDS: CPT

## 2023-01-01 PROCEDURE — 99284 EMERGENCY DEPT VISIT MOD MDM: CPT

## 2023-01-01 PROCEDURE — 99204 OFFICE O/P NEW MOD 45 MIN: CPT

## 2023-01-01 PROCEDURE — 75574 CT ANGIO HRT W/3D IMAGE: CPT | Mod: 26,MH

## 2023-01-01 PROCEDURE — 83690 ASSAY OF LIPASE: CPT

## 2023-01-01 PROCEDURE — 72131 CT LUMBAR SPINE W/O DYE: CPT

## 2023-01-01 RX ORDER — LEVOTHYROXINE SODIUM 125 MCG
1 TABLET ORAL
Qty: 0 | Refills: 0 | DISCHARGE

## 2023-01-01 RX ORDER — SODIUM CHLORIDE 9 MG/ML
500 INJECTION INTRAMUSCULAR; INTRAVENOUS; SUBCUTANEOUS ONCE
Refills: 0 | Status: COMPLETED | OUTPATIENT
Start: 2023-01-01 | End: 2023-01-01

## 2023-01-01 RX ORDER — FUROSEMIDE 40 MG
1.5 TABLET ORAL
Qty: 0 | Refills: 0 | DISCHARGE

## 2023-01-01 RX ORDER — POTASSIUM CHLORIDE 750 MG/1
10 TABLET, EXTENDED RELEASE ORAL DAILY
Qty: 90 | Refills: 0 | Status: ACTIVE | COMMUNITY
Start: 2017-11-06 | End: 1900-01-01

## 2023-01-01 RX ORDER — WARFARIN 5 MG/1
5 TABLET ORAL
Qty: 135 | Refills: 1 | Status: ACTIVE | COMMUNITY
Start: 2020-01-30 | End: 1900-01-01

## 2023-01-01 RX ORDER — FLUTICASONE FUROATE, UMECLIDINIUM BROMIDE AND VILANTEROL TRIFENATATE 200; 62.5; 25 UG/1; UG/1; UG/1
1 POWDER RESPIRATORY (INHALATION)
Qty: 0 | Refills: 0 | DISCHARGE

## 2023-01-01 RX ORDER — FOLIC ACID 1 MG/1
1 TABLET ORAL
Qty: 30 | Refills: 2 | Status: ACTIVE | COMMUNITY
Start: 2020-08-06 | End: 1900-01-01

## 2023-01-01 RX ORDER — CARVEDILOL PHOSPHATE 80 MG/1
0 CAPSULE, EXTENDED RELEASE ORAL
Qty: 0 | Refills: 0 | DISCHARGE

## 2023-01-01 RX ORDER — ENOXAPARIN SODIUM 60 MG/.6ML
60 INJECTION, SOLUTION SUBCUTANEOUS
Qty: 10 | Refills: 0 | Status: ACTIVE | COMMUNITY
Start: 2022-06-14 | End: 1900-01-01

## 2023-01-01 RX ORDER — WARFARIN SODIUM 2.5 MG/1
1 TABLET ORAL
Qty: 0 | Refills: 0 | DISCHARGE

## 2023-01-01 RX ORDER — TRAMADOL HYDROCHLORIDE 50 MG/1
50 TABLET, COATED ORAL
Qty: 20 | Refills: 0 | Status: ACTIVE | COMMUNITY
Start: 2023-01-01 | End: 1900-01-01

## 2023-01-01 RX ORDER — ENOXAPARIN SODIUM 100 MG/ML
60 INJECTION SUBCUTANEOUS ONCE
Refills: 0 | Status: COMPLETED | OUTPATIENT
Start: 2023-01-01 | End: 2023-01-01

## 2023-01-01 RX ORDER — POTASSIUM CHLORIDE 20 MEQ
1 PACKET (EA) ORAL
Qty: 0 | Refills: 0 | DISCHARGE

## 2023-01-01 RX ADMIN — SODIUM CHLORIDE 500 MILLILITER(S): 9 INJECTION INTRAMUSCULAR; INTRAVENOUS; SUBCUTANEOUS at 15:58

## 2023-01-01 RX ADMIN — SODIUM CHLORIDE 500 MILLILITER(S): 9 INJECTION INTRAMUSCULAR; INTRAVENOUS; SUBCUTANEOUS at 16:58

## 2023-01-01 RX ADMIN — ENOXAPARIN SODIUM 60 MILLIGRAM(S): 100 INJECTION SUBCUTANEOUS at 09:55

## 2023-01-02 NOTE — CARDIOLOGY SUMMARY
Patient is A&O x4.  Calm and cooperative. VSS. On tele-NSR, ST. On RA. IV- ml/hr. C/o abdominal pain, IV dilaudid prn per MAR. Denies any N/V.  NPO, ice chips ok. Accuchecks Q6H. Limb precautions. Bed at lowest position. Call light within reach. All needs met at this time.        Problem: PAIN - ADULT  Goal: Verbalizes/displays adequate comfort level or patient's stated pain goal  Description: INTERVENTIONS:  - Encourage pt to monitor pain and request assistance  - Assess pain using appropriate pain scale  - Administer analgesics based on type and severity of pain and evaluate response  - Implement non-pharmacological measures as appropriate and evaluate response  - Consider cultural and social influences on pain and pain management  - Manage/alleviate anxiety  - Utilize distraction and/or relaxation techniques  - Monitor for opioid side effects  - Notify MD/LIP if interventions unsuccessful or patient reports new pain  - Anticipate increased pain with activity and pre-medicate as appropriate  Outcome: Progressing     Problem: GASTROINTESTINAL - ADULT  Goal: Minimal or absence of nausea and vomiting  Description: INTERVENTIONS:  - Maintain adequate hydration with IV or PO as ordered and tolerated  - Nasogastric tube to low intermittent suction as ordered  - Evaluate effectiveness of ordered antiemetic medications  - Provide nonpharmacologic comfort measures as appropriate  - Advance diet as tolerated, if ordered  - Obtain nutritional consult as needed  - Evaluate fluid balance  Outcome: Progressing  Goal: Maintains or returns to baseline bowel function  Description: INTERVENTIONS:  - Assess bowel function  - Maintain adequate hydration with IV or PO as ordered and tolerated  - Evaluate effectiveness of GI medications  - Encourage mobilization and activity  - Obtain nutritional consult as needed  - Establish a toileting routine/schedule  - Consider collaborating with pharmacy to review patient's [No Ischemia] : no Ischemia medication profile  Outcome: Progressing [Fixed Defect] : fixed defect [LVEF ___%] : LVEF [unfilled]% [Moderate] : moderate LV dysfunction [Enlarged] : enlarged LA size [___] : [unfilled]

## 2023-01-02 NOTE — ASSESSMENT
[FreeTextEntry1] : INR today is 2.2 which is satisfactory. he takes warfarin 2.5 mg 2 days/week and 5 mg all other days. he will see Dr. Gresham in spine management and pain management evaluation in [place].I suspect that the spine is complex and complicated and suspect that the procedure may be increased risk especially while being monitored for anticoagulation. I suggested the following regimen Coumadin would be discontinued five days prior to the planned procedure and INR will be checked daily and when the INR goes below two then enoxaparin 60 mg two times per day will be instituted; the evening prior and the day of surgery enoxaparin would be held in anticipation of surgery .his wife was reluctant to give the dose subcutaneously although this will be evaluated\par \par Update 7/12/2022\par No current change in regimen aside from resumption of anticoagulation and reevaluation after surgical opinion obtained\par \par addendum 12/2/22\par we will proceed with nuclear stress testing in order to assess the abnormalities seen on echocardiography and to exclude progressive corner disease\par \par 12/16/22\par nuclear stress test advised consideration to cardiac catheterization and angiography\par \par medical necessity\par this is a high risk encounter based upon review of a prior echocardiogram and the recommendation for a nuclear stress test the setting of advanced cardiovascular disease on anticoagulants and high-risk medications\par \par Discussed with Suzanne at the bedside

## 2023-01-02 NOTE — PHYSICAL EXAM
[Well Developed] : well developed [Well Nourished] : well nourished [No Acute Distress] : no acute distress [Normal Venous Pressure] : normal venous pressure [No Carotid Bruit] : no carotid bruit [Normal S1, S2] : normal S1, S2 [No Murmur] : no murmur [No Rub] : no rub [No Gallop] : no gallop [Clear Lung Fields] : clear lung fields [Good Air Entry] : good air entry [No Respiratory Distress] : no respiratory distress  [Soft] : abdomen soft [Non Tender] : non-tender [No Masses/organomegaly] : no masses/organomegaly [Normal Bowel Sounds] : normal bowel sounds [Normal Gait] : normal gait [No Cyanosis] : no cyanosis [No Clubbing] : no clubbing [No Varicosities] : no varicosities [No Rash] : no rash [No Skin Lesions] : no skin lesions [Moves all extremities] : moves all extremities [No Focal Deficits] : no focal deficits [Normal Speech] : normal speech [Alert and Oriented] : alert and oriented [Normal memory] : normal memory [General Appearance - Well Developed] : well developed [Well Groomed] : well groomed [General Appearance - Well Nourished] : well nourished [General Appearance - In No Acute Distress] : no acute distress [Normal Conjunctiva] : the conjunctiva exhibited no abnormalities [Eyelids - No Xanthelasma] : the eyelids demonstrated no xanthelasmas [Normal Oral Mucosa] : normal oral mucosa [No Oral Pallor] : no oral pallor [No Oral Cyanosis] : no oral cyanosis [Normal Jugular Venous A Waves Present] : normal jugular venous A waves present [Normal Jugular Venous V Waves Present] : normal jugular venous V waves present [No Jugular Venous Perez A Waves] : no jugular venous perez A waves [Respiration, Rhythm And Depth] : normal respiratory rhythm and effort [Exaggerated Use Of Accessory Muscles For Inspiration] : no accessory muscle use [Auscultation Breath Sounds / Voice Sounds] : lungs were clear to auscultation bilaterally [Edema] : no peripheral edema present [Abdomen Soft] : soft [Abdomen Tenderness] : non-tender [Abdomen Mass (___ Cm)] : no abdominal mass palpated [Skin Color & Pigmentation] : normal skin color and pigmentation [] : no rash [No Venous Stasis] : no venous stasis [Skin Lesions] : no skin lesions [No Skin Ulcers] : no skin ulcer [No Xanthoma] : no  xanthoma was observed [Oriented To Time, Place, And Person] : oriented to person, place, and time [Affect] : the affect was normal [Mood] : the mood was normal [No Anxiety] : not feeling anxious [Left Infraclavicular] : left infraclavicular area [de-identified] : bilateral 3 to 4+ [FreeTextEntry1] : clean protruding wire but no erythema or sings of dermatologic erythema or compromise

## 2023-01-02 NOTE — DISCUSSION/SUMMARY
[FreeTextEntry1] : for procedure anesthesia  8/11/22\par last dose Coumadin 8/5/22\par daily INR 8/6-/8/10  (except 8/7)\par begin Lovenox injection 2 times per day AM and PM when INR <2\par no Lovenox PM 8/10 or AM 8/11 begin Coumadin when OK from anesthesia standpoint.  [Procedure Intermediate Risk] : the procedure risk is intermediate [Patient Intermediate Risk] : the patient is an intermediate risk

## 2023-01-02 NOTE — HISTORY OF PRESENT ILLNESS
[FreeTextEntry1] : Jj was recently hospitalized with a diarrheal type illness he was seen by ID service and was noted to have a markedly abnormal CAT scan. He is felt to be a high risk for epidural and he understands this risk  he is seeing the Dr. Gresham in [place] phone [phone] fax [phone] \par \par Update 7/12/2022\par Jj had transient relief from the procedure he will be seeing Dr. Cleveland on Monday for a surgical opinion a brain MRI showed ischemic changes still has residual pain would reevaluate after surgical opinion.  Jj seems to have tolerated the anticoagulation regimen better\par \par Update 12/2/22\par Jj underwent an echocardiogram recently which demonstrates an inferior wall motion abnormality and concerns were raised regarding progressive corner disease given a prior history of a mesenteric stent and atherosclerotic cardiovascular disease in the setting of some dyspnea\par \par update 12/16/22\par The nuclear stress test is planned in order to evaluate complaints of dyspnea I offered cardiac catheterization to Jj he prefers a noninvasive approach first although we have a low index of suspicion for progressive coronary disease this elderly gentleman with advanced peripheral arterial disease

## 2023-02-01 NOTE — ASSESSMENT
[FreeTextEntry1] : Lab work reviewed.\par Patient with history of pancytopenia with elevated MCV and increased monocyte percentage.\par 10/2016–Bone marrow biopsy and bone marrow aspirate showed a hypercellular bone marrow with trilineage hematopoiesis and maturation, increased iron stores.  Flow cytometry myeloid immunophenotypic findings showed no increase in myeloid immaturity.  The lymphocyte immunophenotypic findings showed T-cell predominance and rare B cells.  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  Platelet count appears to be fluctuating/acceptable  on last check. No overt bleeding reported currently. \par \par It remains possible the patient has an underlying myelodysplastic process/CMML.  \par Should hematologic scenario worsen/change, then further evaluation with follow-up BMB would be warranted should patient wish to pursue this. Supportive H/O care at this time for this elderly gentleman.\par \par Patient to continue p.o. vitamin B12 supplement for h/o vitamin B-12 level less than 400, and p.o. folic acid (h/o slightly elevated homocysteine level).\par \par Patient and his wife were given the opportunity to ask questions.  Their questions have been answered to their apparent satisfaction at this time.

## 2023-02-01 NOTE — HISTORY OF PRESENT ILLNESS
[de-identified] : 2012–Pancytopenia with macrocytosis.\par \par 10/2016–Bone marrow biopsy and bone marrow aspirate showed a hypercellular bone marrow with trilineage hematopoiesis and maturation, increased iron stores.  Flow cytometry myeloid immunophenotypic findings showed no increase in myeloid immaturity.  The lymphocyte immunophenotypic findings showed T-cell predominance and rare B cells.  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 Alcazar who monitored his CBC with differential.\par \par 7/2020–Peripheral blood flow cytometry lymphocyte immunophenotypic findings showed no diagnostic abnormalities with lymphopenia.  Myeloid immunophenotypic findings showed normal myeloid granularity with no increase in myeloid immaturity.  No significant absolute lymphocytosis, monocytosis or neutropenia.  MDS FISH panel normal. [de-identified] : Still with back pain post ablation-getting PT. Has orthopedic f/u planned. Considering seeing a pain specialist.\par No c/o bleeding. No fevers, cough. No abdominal pain. No current c/o CP.\par \par Has had COVID vaccines (Moderna), along with booster.

## 2023-02-01 NOTE — PHYSICAL EXAM
[Normal] : affect appropriate [de-identified] : breathing appeared unlabored [de-identified] : coloration appeared normal

## 2023-02-01 NOTE — REASON FOR VISIT
[Follow-Up Visit] : a follow-up visit for [Home] : at home, [unfilled] , at the time of the visit. [Medical Office: ([facility])___] : at the medical office located in  [Spouse] : spouse [Patient] : the patient [Self] : self [FreeTextEntry2] : pancytopenia

## 2023-02-22 NOTE — DIETITIAN INITIAL EVALUATION ADULT - HEIGHT FOR BMI (CENTIMETERS)
187.96 Calcipotriene Pregnancy And Lactation Text: This medication has not been proven safe during pregnancy. It is unknown if this medication is excreted in breast milk.

## 2023-03-01 PROBLEM — M54.50 THORACOLUMBAR BACK PAIN: Status: ACTIVE | Noted: 2023-01-01

## 2023-03-01 NOTE — REVIEW OF SYSTEMS
[Fatigue] : fatigue [Joint Stiffness] : joint stiffness [Back Pain] : back pain [Joint Swelling] : joint swelling [FreeTextEntry9] : worsening back pain

## 2023-03-01 NOTE — PHYSICAL EXAM
[No JVD] : no jugular venous distention [No Respiratory Distress] : no respiratory distress  [No Accessory Muscle Use] : no accessory muscle use [Clear to Auscultation] : lungs were clear to auscultation bilaterally [Normal S1, S2] : normal S1 and S2 [de-identified] : IRR [de-identified] : pain toracolumbard region back no skin changes swelling or masses pain onplapation paraspinal muscle more right sided not able to lay down due to pain [de-identified] : 3+ leg swelling  [de-identified] : antalgic gait

## 2023-03-01 NOTE — HISTORY OF PRESENT ILLNESS
[Spouse] : spouse [Other: _____] : [unfilled] [FreeTextEntry8] : 89 y/o PMHX HTN HLD A Fib CHF CBP hypothyroidism morbid obesity here c/o worsening back pain notes PT does no longer help with pain and went to ortho and given Percocet and oxycodone with some help but pain comes back. pt notes cannot get out of bed w/o assistance and now has a rope that has to hold on to help him sit from recumbent position Also notes pain legs when laying down but no bowel or bladder complains  Pain severe as per pt

## 2023-03-01 NOTE — ASSESSMENT
[FreeTextEntry1] : advised in water exercises swimming \par PT but declined does not help as per pt \par weight loss advised as well.\par f/u ortho spine

## 2023-03-08 NOTE — PHYSICAL EXAM
[Well Developed] : well developed [Well Nourished] : well nourished [No Acute Distress] : no acute distress [Normal Venous Pressure] : normal venous pressure [No Carotid Bruit] : no carotid bruit [Normal S1, S2] : normal S1, S2 [No Murmur] : no murmur [No Rub] : no rub [No Gallop] : no gallop [Clear Lung Fields] : clear lung fields [Good Air Entry] : good air entry [No Respiratory Distress] : no respiratory distress  [Soft] : abdomen soft [Non Tender] : non-tender [No Masses/organomegaly] : no masses/organomegaly [Normal Bowel Sounds] : normal bowel sounds [Normal Gait] : normal gait [No Cyanosis] : no cyanosis [No Clubbing] : no clubbing [No Varicosities] : no varicosities [No Rash] : no rash [No Skin Lesions] : no skin lesions [Moves all extremities] : moves all extremities [No Focal Deficits] : no focal deficits [Normal Speech] : normal speech [Alert and Oriented] : alert and oriented [Normal memory] : normal memory [General Appearance - Well Developed] : well developed [Well Groomed] : well groomed [General Appearance - Well Nourished] : well nourished [General Appearance - In No Acute Distress] : no acute distress [Normal Conjunctiva] : the conjunctiva exhibited no abnormalities [Eyelids - No Xanthelasma] : the eyelids demonstrated no xanthelasmas [Normal Oral Mucosa] : normal oral mucosa [No Oral Pallor] : no oral pallor [No Oral Cyanosis] : no oral cyanosis [Normal Jugular Venous A Waves Present] : normal jugular venous A waves present [Normal Jugular Venous V Waves Present] : normal jugular venous V waves present [No Jugular Venous Perez A Waves] : no jugular venous perez A waves [Respiration, Rhythm And Depth] : normal respiratory rhythm and effort [Exaggerated Use Of Accessory Muscles For Inspiration] : no accessory muscle use [Auscultation Breath Sounds / Voice Sounds] : lungs were clear to auscultation bilaterally [Edema] : no peripheral edema present [Abdomen Soft] : soft [Abdomen Tenderness] : non-tender [Abdomen Mass (___ Cm)] : no abdominal mass palpated [Skin Color & Pigmentation] : normal skin color and pigmentation [] : no rash [No Venous Stasis] : no venous stasis [Skin Lesions] : no skin lesions [No Skin Ulcers] : no skin ulcer [No Xanthoma] : no  xanthoma was observed [Oriented To Time, Place, And Person] : oriented to person, place, and time [Affect] : the affect was normal [Mood] : the mood was normal [No Anxiety] : not feeling anxious [Left Infraclavicular] : left infraclavicular area [de-identified] : bilateral 3 to 4+ [FreeTextEntry1] : clean protruding wire but no erythema or sings of dermatologic erythema or compromise

## 2023-03-08 NOTE — ASSESSMENT
[FreeTextEntry1] : INR today is 2.2 which is satisfactory. he takes warfarin 2.5 mg 2 days/week and 5 mg all other days. he will see Dr. Gresham in spine management and pain management evaluation in [place].I suspect that the spine is complex and complicated and suspect that the procedure may be increased risk especially while being monitored for anticoagulation. I suggested the following regimen Coumadin would be discontinued five days prior to the planned procedure and INR will be checked daily and when the INR goes below two then enoxaparin 60 mg two times per day will be instituted; the evening prior and the day of surgery enoxaparin would be held in anticipation of surgery .his wife was reluctant to give the dose subcutaneously although this will be evaluated\par \par Update 7/12/2022\par No current change in regimen aside from resumption of anticoagulation and reevaluation after surgical opinion obtained\par \par addendum 12/2/22\par we will proceed with nuclear stress testing in order to assess the abnormalities seen on echocardiography and to exclude progressive corner disease\par \par 12/16/22\par nuclear stress test advised consideration to cardiac catheterization and angiography\par \par 3/7/2023\par Given ejection fraction of 39% would therefore add a ARB agent in addition to beta-blocker spironolactone is discretionary Farxiga is also consideration would watch creatinine carefully on ARB I discussed with Pablo at bedside.  Upcoming device check 3/15/2023 for defibrillator pacer.  I will check renal function prior to instituting losartan 50 mg\par \par medical necessity\par this is a high risk encounter based upon review of a prior echocardiogram and stress testing the recommendation for a n ARB i the setting of advanced cardiovascular disease on anticoagulants and high-risk medications\par \par Discussed with Pablo at the bedside

## 2023-03-08 NOTE — HISTORY OF PRESENT ILLNESS
[FreeTextEntry1] : Jj was recently hospitalized with a diarrheal type illness he was seen by ID service and was noted to have a markedly abnormal CAT scan. He is felt to be a high risk for epidural and he understands this risk  he is seeing the Dr. Gresham in [place] phone [phone] fax [phone] \par \par Update 7/12/2022\par Jj had transient relief from the procedure he will be seeing Dr. Cleveland on Monday for a surgical opinion a brain MRI showed ischemic changes still has residual pain would reevaluate after surgical opinion.  Jj seems to have tolerated the anticoagulation regimen better\par \par Update 12/2/22\par Jj underwent an echocardiogram recently which demonstrates an inferior wall motion abnormality and concerns were raised regarding progressive corner disease given a prior history of a mesenteric stent and atherosclerotic cardiovascular disease in the setting of some dyspnea\par \par update 12/16/22\par The nuclear stress test is planned in order to evaluate complaints of dyspnea I offered cardiac catheterization to Jj he prefers a noninvasive approach first although we have a low index of suspicion for progressive coronary disease this elderly gentleman with advanced peripheral arterial disease\par \par Update 3/7/2023 \par Jj comes today for review of recent nuclear testing which shows infarction without ischemia however ejection fraction low at 39%

## 2023-03-13 NOTE — ASSESSMENT
[FreeTextEntry1] : CBC with diff. reviewed.\par Patient with history of pancytopenia with elevated MCV and increased monocyte percentage.\par 10/2016–Bone marrow biopsy and bone marrow aspirate showed a hypercellular bone marrow with trilineage hematopoiesis and maturation, increased iron stores.  Flow cytometry myeloid immunophenotypic findings showed no increase in myeloid immaturity.  The lymphocyte immunophenotypic findings showed T-cell predominance and rare B cells.  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  Platelet count appears to be fluctuating/adequate  on most recent check. No overt bleeding reported currently, though decrease in hemoglobin noted.\par \par It remains possible the patient has an underlying myelodysplastic process/CMML.  \par Should hematologic scenario worsen/change, then further evaluation with follow-up BMB would be warranted should patient wish to pursue this. Supportive H/O care at this time for this elderly gentleman.\par \par Patient to continue p.o. vitamin B12 supplement for h/o vitamin B-12 level less than 400, and p.o. folic acid (h/o slightly elevated homocysteine level).\par \par Patient and his wife were given the opportunity to ask questions.  Their questions have been answered to their apparent satisfaction at this time.

## 2023-03-13 NOTE — HISTORY OF PRESENT ILLNESS
[de-identified] : 2012–Pancytopenia with macrocytosis.\par \par 10/2016–Bone marrow biopsy and bone marrow aspirate showed a hypercellular bone marrow with trilineage hematopoiesis and maturation, increased iron stores.  Flow cytometry myeloid immunophenotypic findings showed no increase in myeloid immaturity.  The lymphocyte immunophenotypic findings showed T-cell predominance and rare B cells.  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 Alcazar who monitored his CBC with differential.\par \par 7/2020–Peripheral blood flow cytometry lymphocyte immunophenotypic findings showed no diagnostic abnormalities with lymphopenia.  Myeloid immunophenotypic findings showed normal myeloid granularity with no increase in myeloid immaturity.  No significant absolute lymphocytosis, monocytosis or neutropenia.  MDS FISH panel normal. [de-identified] : Deciding about having another ablation for persistent back problems, though patient not inclined currently.\par No c/o bleeding. No fevers, cough. No abdominal pain. No current c/o CP.\par \par

## 2023-04-12 NOTE — PROCEDURE
[CRT-D] : Cardiac resynchronization therapy defibrillator [VVIR] : VVIR [Longevity: ___ months] : The estimated remaining battery life is [unfilled] months [Threshold Testing Performed] : Threshold testing was performed [Lead Imp:  ___ohms] : lead impedance was [unfilled] ohms [___V @] : [unfilled] V [___ ms] : [unfilled] ms [None] : none [Pace ___ %] : Pace [unfilled]% [de-identified] : bi v paced [de-identified] : st judes [de-identified] : 6246-65f [de-identified] : 12/11/2018 [de-identified] : [id number] [de-identified] : gretchen lead present working well no evidence of insulation problem\par no therapies\par normal function\par \par

## 2023-05-01 NOTE — PHYSICAL EXAM
[Well Developed] : well developed [Well Nourished] : well nourished [No Acute Distress] : no acute distress [Normal Venous Pressure] : normal venous pressure [No Carotid Bruit] : no carotid bruit [Normal S1, S2] : normal S1, S2 [No Murmur] : no murmur [No Rub] : no rub [No Gallop] : no gallop [Clear Lung Fields] : clear lung fields [Good Air Entry] : good air entry [No Respiratory Distress] : no respiratory distress  [Soft] : abdomen soft [Non Tender] : non-tender [No Masses/organomegaly] : no masses/organomegaly [Normal Bowel Sounds] : normal bowel sounds [Normal Gait] : normal gait [No Cyanosis] : no cyanosis [No Clubbing] : no clubbing [No Varicosities] : no varicosities [No Rash] : no rash [No Skin Lesions] : no skin lesions [Moves all extremities] : moves all extremities [No Focal Deficits] : no focal deficits [Normal Speech] : normal speech [Alert and Oriented] : alert and oriented [Normal memory] : normal memory [General Appearance - Well Developed] : well developed [Well Groomed] : well groomed [General Appearance - Well Nourished] : well nourished [General Appearance - In No Acute Distress] : no acute distress [Normal Conjunctiva] : the conjunctiva exhibited no abnormalities [Eyelids - No Xanthelasma] : the eyelids demonstrated no xanthelasmas [Normal Oral Mucosa] : normal oral mucosa [No Oral Pallor] : no oral pallor [No Oral Cyanosis] : no oral cyanosis [Normal Jugular Venous A Waves Present] : normal jugular venous A waves present [Normal Jugular Venous V Waves Present] : normal jugular venous V waves present [No Jugular Venous Perez A Waves] : no jugular venous perez A waves [Respiration, Rhythm And Depth] : normal respiratory rhythm and effort [Exaggerated Use Of Accessory Muscles For Inspiration] : no accessory muscle use [Auscultation Breath Sounds / Voice Sounds] : lungs were clear to auscultation bilaterally [Edema] : no peripheral edema present [Abdomen Soft] : soft [Abdomen Tenderness] : non-tender [Abdomen Mass (___ Cm)] : no abdominal mass palpated [Skin Color & Pigmentation] : normal skin color and pigmentation [] : no rash [No Venous Stasis] : no venous stasis [Skin Lesions] : no skin lesions [No Skin Ulcers] : no skin ulcer [No Xanthoma] : no  xanthoma was observed [Oriented To Time, Place, And Person] : oriented to person, place, and time [Affect] : the affect was normal [Mood] : the mood was normal [No Anxiety] : not feeling anxious [Left Infraclavicular] : left infraclavicular area [de-identified] : bilateral 3 to 4+ [FreeTextEntry1] : clean protruding wire but no erythema or sings of dermatologic erythema or compromise

## 2023-05-01 NOTE — HISTORY OF PRESENT ILLNESS
[FreeTextEntry1] : Jj was recently hospitalized with a diarrheal type illness he was seen by ID service and was noted to have a markedly abnormal CAT scan. He is felt to be a high risk for epidural and he understands this risk  he is seeing the Dr. Gresham in [facility] phone [phone] fax [phone] \par \par Update 7/12/2022\par Jj had transient relief from the procedure he will be seeing Dr. Cleveland on Monday for a surgical opinion a brain MRI showed ischemic changes still has residual pain would reevaluate after surgical opinion.  Jj seems to have tolerated the anticoagulation regimen better\par \par Update 12/2/22\par Jj underwent an echocardiogram recently which demonstrates an inferior wall motion abnormality and concerns were raised regarding progressive corner disease given a prior history of a mesenteric stent and atherosclerotic cardiovascular disease in the setting of some dyspnea\par \par update 12/16/22\par The nuclear stress test is planned in order to evaluate complaints of dyspnea I offered cardiac catheterization to Jj he prefers a noninvasive approach first although we have a low index of suspicion for progressive coronary disease this elderly gentleman with advanced peripheral arterial disease\par \par Update 3/7/2023 \par Jj comes today for review of recent nuclear testing which shows infarction without ischemia however ejection fraction low at 39% \par \par update 4/4/23\par Javon has persistent complaints of shortness of breath and we are concerned about progression of  disease especially in the setting of prior celiac stent

## 2023-05-01 NOTE — ASSESSMENT
[FreeTextEntry1] : INR today is 2.2 which is satisfactory. he takes warfarin 2.5 mg 2 days/week and 5 mg all other days. he will see Dr. Gresham in spine management and pain management evaluation in [place].I suspect that the spine is complex and complicated and suspect that the procedure may be increased risk especially while being monitored for anticoagulation. I suggested the following regimen Coumadin would be discontinued five days prior to the planned procedure and INR will be checked daily and when the INR goes below two then enoxaparin 60 mg two times per day will be instituted; the evening prior and the day of surgery enoxaparin would be held in anticipation of surgery .his wife was reluctant to give the dose subcutaneously although this will be evaluated\par \par Update 7/12/2022\par No current change in regimen aside from resumption of anticoagulation and reevaluation after surgical opinion obtained\par \par addendum 12/2/22\par we will proceed with nuclear stress testing in order to assess the abnormalities seen on echocardiography and to exclude progressive corner disease\par \par 12/16/22\par nuclear stress test advised consideration to cardiac catheterization and angiography\par \par 3/7/2023\par Given ejection fraction of 39% would therefore add a ARB agent in addition to beta-blocker spironolactone is discretionary Farxiga is also consideration would watch creatinine carefully on ARB I discussed with Pablo at bedside.  Upcoming device check 3/15/2023 for defibrillator pacer.  I will check renal function prior to instituting losartan 50 mg\par \par \par 4/4/23\par cardiac CTA requested in order to assess coronary anatomy probably unable to tolerate a stress test given severe back pains\par \par \par medical necessity\par this is a high risk encounter based upon review of a prior echocardiogram and stress testing the recommendation for a cardiac CTA\par

## 2023-05-03 PROBLEM — M51.37 DISC DEGENERATION, LUMBOSACRAL: Status: ACTIVE | Noted: 2023-01-01

## 2023-05-03 PROBLEM — M54.9 CHRONIC BACK PAIN GREATER THAN 3 MONTHS DURATION: Status: ACTIVE | Noted: 2023-01-01

## 2023-05-03 PROBLEM — M51.34 DEGENERATION OF INTERVERTEBRAL DISC OF THORACIC REGION WITH OSTEOPHYTE: Status: ACTIVE | Noted: 2023-01-01

## 2023-05-03 NOTE — PHYSICAL EXAM
[Walker] : ambulates with walker [Obese] : obese [Stooped] : stooped [de-identified] : exam limited by patients immobility, stiffness \par  [de-identified] : seen with his son and wife [de-identified] : ACC: [id number] EXAM: CT LUMBAR SPINE ORDERED BY: DELFIAN ROGERS\par \par PROCEDURE DATE: 02/27/2023\par \par \par \par INTERPRETATION: LUMBAR SPINE CT\par \par CLINICAL INFORMATION: Pain. No prior surgery.\par \par TECHNIQUE: Axial CT images were obtained of the lumbar spine with coronal and sagittal reconstructions.\par \par COMPARISON: CT dated 4/25/2022. No MRI available.\par \par FINDINGS:\par \par OSSEOUS:\par No acute compression fracture.\par Moderate levoscoliosis with apex at L2-L3.\par Grade 1 retrolisthesis at L1-L2, L2-L3, and L5-S1. Grade 1 anterolisthesis at L4-L5 without pars interarticularis defects.\par Advanced multilevel loss of intervertebral disc height, most severe at L1-L2 and L2-L3 with corresponding prominent degenerative endplate changes.\par Abutment of the posterior spinous processes with pseudoarthrosis most advanced from L2-L3 through L4-L5.\par Multilevel spinal canal stenosis most advanced at L2-L3 with annular disc osteophyte complex and moderate facet arthrosis contributing to at least moderate spinal canal stenosis at this level. Multilevel neural canal stenosis most severe on the right side at L2-L3.\par Advanced multilevel facet arthrosis most severe bilaterally at L4-5 and on the left side L5-S1.\par Bony demineralization.\par \par GENERAL: No prevertebral soft tissue swelling. Vacuum disc phenomenon at L2-L3, excluding discitis. Atherosclerosis. Moderate disproportionate chronic atrophy of the lumbar posterior paraspinal and posterior parasacral musculature.\par \par IMPRESSION:\par 1. Moderate lumbar levoscoliosis with superimposed advanced discogenic spondyloarthropathy most severe at L2-L3, contributing to at least moderate spinal canal stenosis and moderate to severe right-sided neural canal stenosis at this level. Consider follow-up lumbar MRI versus CT myelogram for further evaluation as clinically indicated.\par 2. Advanced multilevel lumbar facet arthrosis most severe bilaterally at L4-L5, left-sided L5-S1.\par \par --- End of Report ---\par \par \par ESEQUIEL SNOW MD; Attending Radiologist\par This document has been electronically signed. Mar 2 2023 9:27AM

## 2023-05-03 NOTE — REASON FOR VISIT
[Initial Visit] : an initial visit for [Back Pain] : back pain [Spouse] : spouse [Family Member] : family member

## 2023-05-03 NOTE — HISTORY OF PRESENT ILLNESS
[Worsening] : worsening [10] : a current pain level of 10/10 [Daily] : ~He/She~ states the symptoms seem to be occuring daily [Rest] : relieved by rest [___ yrs] : [unfilled] year(s) ago [de-identified] : Patient is here today for another opinion on his low back no radicular pain going on for over one year no known injury. Patient been seeing  in August 2022 had lumbar epidural no real relief went for a ablation in September 2022 and also went for physical therapy for over 6 months and nothing is helping. Patient recently went for cat scan of the lumbar spine 2/27/2023. [de-identified] : In the am sitting to standing to walking  [de-identified] : oxycodone cbd oil gummies   rolling walker

## 2023-05-03 NOTE — DISCUSSION/SUMMARY
[Medication Risks Reviewed] : Medication risks reviewed [de-identified] : Patient presents with primarily axial low back pain in the thoracolumbar junction.  He reports pain stabbing in that area in the morning.  He also reports intermittent stabbing pain at the lumbosacral junction.  Denies any radicular symptoms.  Walking tolerance is limited by his thoracolumbar junctional pain.\par \par CT of the lumbar spine performed previously was independent reviewed by me and findings discussed with the patient his son and his wife.  He has extensive degenerative changes in the upper lumbar spine and thoracolumbar junction.  Lumbosacral junction is better preserved.  Recommend he focus treatments at the thoracolumbar junction.  Given his advanced age and multiple medical comorbidities he is not a candidate for any spinal surgical interventions which would be primarily spinal fusion to address degenerative changes.  In this setting referral to pain management was provided for consideration of spinal cord stimulators.  TLSO was prescribed.  Acupuncture was prescribed.  Weight loss was recommended and evaluation by nutritionist prescribed.\par \par I will continue to see him back on as-needed basis for his symptoms.

## 2023-05-12 NOTE — ASSESSMENT
[FreeTextEntry1] : Subjective findings\par This 88-year-old male presents to us with the patient for years.  Patient has been to multiple pain providers and surgeons.  He is not considered a surgical candidate at this time.  His last pain provider has narcotic management which minimally affects his pain.  He also states that he has had injection therapy with his last pain provider records of those procedures are not available at the time of evaluation.  The patient denies any bowel or bladder progressive weakness and states the pain is problematic.  The patient presents to us for our ongoing evaluation of his lumbar radiculopathy.  His last surgeon suggested that he undergo spinal cord stimulation implantation for this pain.  The CV/Pulm/GI/Heme/Renal/Hepatic//Psych/Endo systems are reviewed. A full ROS was performed and reviewed with the patient today, see intake form.  Average pain score for the month is 7 out of ten. The patient's current medications are documented to the best of their ability. The patient has failed conservative therapies to include medical management, and physical activity to treat the pain. The patient has decreased function secondary to the pain.\par Objective findings\par Imaging studies are consistent with the patient's pain complaints.  Patient uses a walker for comfort.  He is able to get to a standing position with difficulty.  While standing patient ambulates with an antalgic gait.\par Plan\par Prior to any interventional pain management the patient will obtain the operative records of the procedure that have already been performed to determine whether there would be another way to help treat this pain.  Patient is on anticoagulation and would have to have clearance from his cardiologist to come off his warfarin for the injections.  Spoke to patient about epidural steroid injections. Explained risks, benefits and alternatives including but not limited to the risk of infection, bleeding, headache, syncopal episode, failure to resolve issues, allergic reaction, symptom recurrence, allergic reaction, nerve injury, and increased pain. The patient understands the risks. All questions were answered. The patient is willing to proceed. The importance of increasing exercise after the injection is also stressed. The use of the injection as a jump start so that the patient can do more exercise, but that the exercise is the long term answer for the patient is reviewed. The patient states understanding.\par The patient was also interested in medical marijuana and will be registered by this office for a high CBD low THC formulation.\par

## 2023-05-12 NOTE — HISTORY OF PRESENT ILLNESS
[Lower back] : lower back [9] : 9 [7] : 7 [Constant] : constant [Household chores] : household chores [Rest] : rest [Sitting] : sitting [Standing] : standing [Walking] : walking [Bending forward] : bending forward [] : Post Surgical Visit: no [FreeTextEntry6] : PIN AND NEEDELE

## 2023-05-27 NOTE — ED PROVIDER NOTE - ATTENDING APP SHARED VISIT CONTRIBUTION OF CARE
88-year-old male with a medical history including atrial fibrillation chronic congestive heart failure PPM/AICD, CAD, HTN, GERD, HLD, iliac artery aneurysm and mesenteric ischemia (status post vascular stenting approximately 15 years ago), PVD, complaining of approximately 1 week of black stools, intermittent lower abdominal pain that patient noted today while getting out of the car. Patient denies any fever/chills chest nausea/vomiting/chest pain/shortness of breath. Patient denies any bright red blood on toilet paper.    Patient is currently taking iron supplements and is on Coumadin.  As per patient's wife last INR was 2.6.  No recent change in Coumadin dose.  Patient denies any falls or injuries.  Patient is constipated and is on laxatives and stool softeners.  Patient also reports recently starting medical cannabis for chronic arthritic spine pain.     CBC, CMP, INR, T&S, Guaiac, IVF, CT A/P, Re-assess.  Guaiac Negative, INR 3.3, Hgb/Hct 11.2/35, CT scan as per report.  Dr. Sanchez:  I have reviewed and discussed with the PA/ resident the case specifics, including the history, physical assessment, evaluation, conclusion, laboratory results, and medical plan. I agree with the contents, and conclusions. I have personally examined, and interviewed the patient.

## 2023-05-27 NOTE — ED PROVIDER NOTE - NONTENDER LOCATION
left upper quadrant/right upper quadrant/umbilical/suprapubic/left costovertebral angle/right costovertebral angle

## 2023-05-27 NOTE — ED PROVIDER NOTE - NS ED ATTENDING STATEMENT MOD
This was a shared visit with the GALINA. I reviewed and verified the documentation and independently performed the documented:

## 2023-05-27 NOTE — ED ADULT NURSE NOTE - OBJECTIVE STATEMENT
Patient came from home with complaint of bloody stool for the past 10 days. Patient takes coumadin and has a PPM in place. Patient denies any fever, chills, nausea, vomit or diarrhea. Denies any dizziness, headache or any recent falls or trauma. Family at bedside.

## 2023-05-27 NOTE — ED PROVIDER NOTE - OBJECTIVE STATEMENT
88-year-old male with a medical history including atrial fibrillation chronic congestive heart failure PPM/AICD, CAD, HTN, GERD, HLD, iliac artery aneurysm and mesenteric ischemia (status post vascular stenting approximately 15 years ago), PVD, complaining of approximately 1 week of black stools, intermittent lower abdominal pain that patient noted today while getting out of the car. Patient denies any fever/chills chest nausea/vomiting/chest pain/shortness of breath. Patient denies any bright red blood on toilet paper.

## 2023-05-27 NOTE — CHART NOTE - NSCHARTNOTEFT_GEN_A_CORE
SW met withe the pt/family at the bedside to assess for social work needs and discuss home assessment of safety.  Pt is an 89 y/o male presented to ED for bloody stool.    Emergency Department Social Work Geriatric Initial Assessment    Cognitive Mental Status -Alert and oriented   Primary Caregiver Information – Mrs. Ornelas, Wife [phone]  Emergency Contact Information – Mrs. Ornelas, Wife [phone]  Primary Care Physician / Specialists -Urbano Barba Brenda  Functional Status Prior to ED Visit - Able to move around using walker and is able complete different tasks with support of the family.  Family and Social Support –Family supportive   Living Arrangement -live in a private house with his wife.  Services Present on Admission – Pt was able to attend all the appts and is being support by the family.  Assistive Devices (Durable Medical Equipment) –Walker   ADLs & Degree of Baxter – Able to complete different task with the support of the family.  Barriers to obtain medications -None  Barriers to attend medical appointments -None  ISAR Score – 1 non significant, discussed about safety measures and provided a copy of the safety check list, and discussed about geriatric care along with the primary appt.    Advanced Directives –   Follow up care – Pt has a scheduled appt with primary Nova Varela on 6/16/23.   Discharge Transportation – Family  Summary/Recommendations/Referrals -Pt is feeling safe at home and has no safety concerns, worker did not identify any other areas of concerns, encourage pt to call SW if further assistance is needed.

## 2023-05-27 NOTE — ED PROVIDER NOTE - PATIENT PORTAL LINK FT
You can access the FollowMyHealth Patient Portal offered by [facility] by registering at the following website: [email or web address]. By joining [employer]’s FollowMyHealth portal, you will also be able to view your health information using other applications (apps) compatible with our system.

## 2023-05-27 NOTE — ED PROVIDER NOTE - CLINICAL SUMMARY MEDICAL DECISION MAKING FREE TEXT BOX
88-year-old male with a medical history including atrial fibrillation chronic congestive heart failure PPM/AICD, CAD, HTN, GERD, HLD, iliac artery aneurysm and mesenteric ischemia (status post vascular stenting approximately 15 years ago), PVD, complaining of approximately 1 week of black stools, intermittent lower abdominal pain that patient noted today while getting out of the car. Patient denies any fever/chills chest nausea/vomiting/chest pain/shortness of breath. Patient denies any bright red blood on toilet paper.    Patient is currently taking iron supplements and is on Coumadin.  As per patient's wife last INR was 2.6.  No recent change in Coumadin dose.  Patient denies any falls or injuries.  Patient is constipated and is on laxatives and stool softeners.  Patient also reports recently starting medical cannabis for chronic arthritic spine pain.     CBC, CMP, INR, T&S, Guaiac, IVF, CT A/P, Re-assess. 88-year-old male with a medical history including atrial fibrillation chronic congestive heart failure PPM/AICD, CAD, HTN, GERD, HLD, iliac artery aneurysm and mesenteric ischemia (status post vascular stenting approximately 15 years ago), PVD, complaining of approximately 1 week of black stools, intermittent lower abdominal pain that patient noted today while getting out of the car. Patient denies any fever/chills chest nausea/vomiting/chest pain/shortness of breath. Patient denies any bright red blood on toilet paper.    Patient is currently taking iron supplements and is on Coumadin.  As per patient's wife last INR was 2.6.  No recent change in Coumadin dose.  Patient denies any falls or injuries.  Patient is constipated and is on laxatives and stool softeners.  Patient also reports recently starting medical cannabis for chronic arthritic spine pain.     CBC, CMP, INR, T&S, Guaiac, IVF, CT A/P, Re-assess.  Guaiac Negative, INR 3.3, Hgb/Hct 11.2/35, CT scan as per report.

## 2023-05-27 NOTE — ED ADULT NURSE NOTE - NSFALLHARMRISKINTERV_ED_ALL_ED

## 2023-05-27 NOTE — ED PROVIDER NOTE - NSFOLLOWUPINSTRUCTIONS_ED_ALL_ED_FT
-Your INR was high (3.3) today in the ED.  Do NOT take your Coumadin (Warfarin) tonight.   Resume your normal dose and time tomorrow, Sunday 5/28/23.    -Follow up with your doctor, your cardiologist and your gastroenterologist.  Be sure to provide the included lab results.    -Return to the ED for any concerns.        Elevated INR    WHAT YOU NEED TO KNOW:    What is an elevated INR? The INR, or International Normalized Ratio, is a measure of how long it takes your blood to clot. A prothrombin time (PT) is a another blood test done to help measure your INR. The higher your PT or INR, the longer your blood takes to clot. An elevated PT or INR means your blood is taking longer to clot than your healthcare provider believes is healthy for you. When your PT or INR is too high, you have an increased risk of bleeding.    What increases my risk for an elevated INR?    Too much anticoagulant medicine, a type of blood thinner that helps prevent clots    Other medicines, such as aspirin, NSAIDs, and some antibiotics, when you also are using anticoagulants    Health conditions, such as liver failure or bleeding disorders    A sudden decrease of vitamin K in your diet  What are the signs and symptoms of an elevated INR? You may have small cuts that bleed more than normal, and for longer than normal. You may bruise easily, have frequent nosebleeds, or notice your gums bleeding.    How is an elevated INR treated? Treatment depends on whether you currently have bleeding and how severe it is. If you take an anticoagulant medicine, your healthcare provider may change your dose, or tell you to skip one or more doses. You may need one of the following treatments:    Vitamin K may be given to decrease your INR and bleeding.    Blood components may be given during a transfusion to help stop your bleeding. Blood components are the parts of blood that help it to clot. Examples are clotting factors, platelets, and plasma.  How can I prevent an elevated INR?    Have your INR measured regularly. Your healthcare provider may want your INR to be measured every few days until it is stable, and then only once a month. You may have blood drawn at your healthcare provider's office. Some people can test their blood at home.    If you take medicine, take it as directed. Contact your healthcare provider before you take other medicines or supplements, because they may elevate your INR.    Eat the same amount of vitamin K daily to keep your INR stable. Vitamin K changes how your blood clots and affects your INR. Vitamin K is found in green leafy vegetables, broccoli, grapes, and other foods. Ask your healthcare provider for more information about what to eat when you have an elevated INR.    Limit alcohol. Alcohol increases your INR. Ask your healthcare provider how much alcohol is safe for you.    Do not smoke. If you smoke, it is never too late to quit. Smoking can affect the way your blood clots. Ask for information if you need help quitting.  How can I decrease my risk of bleeding?    Avoid activities that may cause bleeding or bruising.    Brush and shave gently. Use a soft toothbrush and an electric razor to avoid bleeding.    Tell your dentist and other healthcare providers if you take anticoagulant medicine or have a bleeding disorder. Wear medical alert jewelry, or carry a card that gives this information. Ask where you can get these items.  When should I contact my healthcare provider?    Your menstrual period is heavier than normal.    You see blood in your urine.    Your bowel movement is bloody or black.    You bruise or bleed more than normal, your gums bleed, or you have frequent nosebleeds.    You have pain or swelling in your joints.    Your fingers or toes turn dark purple.    You have more headaches than normal, or your headaches are different than before.    You have questions or concerns about your care.  When should I seek immediate care or call 911?    You throw up blood, or your vomit looks like coffee grounds.    You have any kind of bleeding that does not stop in 15 minutes.    Your leg feels warm, tender, and painful. It may look swollen and red.    You have any of the following signs of a stroke:  Numbness or drooping on one side of your face    Weakness in an arm or leg    Confusion or difficulty speaking    Dizziness, a severe headache, or vision loss

## 2023-06-01 NOTE — CHART NOTE - NSCHARTNOTEFT_GEN_A_CORE
As per chart review, SW assessed patient during ED visit and was informed patient has a scheduled appt with primary Nova Varela on 6/16/23. appears normal and intact

## 2023-06-06 PROBLEM — M54.16 CHRONIC LUMBAR RADICULOPATHY: Status: ACTIVE | Noted: 2023-01-01

## 2023-06-06 PROBLEM — D72.828 HIGH BLOOD MONOCYTE COUNT: Status: ACTIVE | Noted: 2020-06-23

## 2023-06-06 PROBLEM — D64.9 ANEMIA: Status: ACTIVE | Noted: 2023-01-01

## 2023-06-06 NOTE — PHYSICAL EXAM
[Normal] : affect appropriate [de-identified] : breathing appeared unlabored [de-identified] : coloration appeared normal

## 2023-06-06 NOTE — REASON FOR VISIT
[Follow-Up Visit] : a follow-up visit for [Home] : at home, [unfilled] , at the time of the visit. [Medical Office: ([facility])___] : at the medical office located in  [Spouse] : spouse [Patient] : the patient [Self] : self [FreeTextEntry2] : pancytopenia

## 2023-06-06 NOTE — HISTORY OF PRESENT ILLNESS
[de-identified] : 2012–Pancytopenia with macrocytosis.\par \par 10/2016–Bone marrow biopsy and bone marrow aspirate showed a hypercellular bone marrow with trilineage hematopoiesis and maturation, increased iron stores.  Flow cytometry myeloid immunophenotypic findings showed no increase in myeloid immaturity.  The lymphocyte immunophenotypic findings showed T-cell predominance and rare B cells.  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 Alcazar who monitored his CBC with differential.\par \par 7/2020–Peripheral blood flow cytometry lymphocyte immunophenotypic findings showed no diagnostic abnormalities with lymphopenia.  Myeloid immunophenotypic findings showed normal myeloid granularity with no increase in myeloid immaturity.  No significant absolute lymphocytosis, monocytosis or neutropenia.  MDS FISH panel normal. [de-identified] : Saw pain specialist today-has chronic back pain. Prescribed cannabis, but didn't work. Considering epidural steroid injections. Plans f/u with Dr. Razo regarding Coumadin.\par No c/o bleeding. No fevers, cough. No abdominal pain. No current c/o CP.\par \par

## 2023-06-06 NOTE — ASSESSMENT
[FreeTextEntry1] : Lab work reviewed.\par Patient with history of pancytopenia with elevated MCV and increased monocyte percentage.\par 10/2016–Bone marrow biopsy and bone marrow aspirate showed a hypercellular bone marrow with trilineage hematopoiesis and maturation, increased iron stores.  Flow cytometry myeloid immunophenotypic findings showed no increase in myeloid immaturity.  The lymphocyte immunophenotypic findings showed T-cell predominance and rare B cells.  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  Platelet count appears to be fluctuating/adequate  on most recent check. Hemoglobin stable. No overt bleeding reported currently.\par \par It remains possible the patient has an underlying myelodysplastic process/CMML.  \par Should hematologic scenario worsen/change, then further evaluation with follow-up BMB would be warranted should patient wish to pursue this. Supportive H/O care at this time for this elderly gentleman.\par \par Patient to continue p.o. vitamin B12 supplement for h/o vitamin B-12 level less than 400, and p.o. folic acid (h/o slightly elevated homocysteine level).\par \par Patient and his wife were given the opportunity to ask questions.  Their questions have been answered to their apparent satisfaction at this time.

## 2023-06-06 NOTE — ASSESSMENT
[FreeTextEntry1] : Interim history\par he patient returns with pain that has been treated adequately in the past with epidural steroid injections.  The patient's complaints are consistent with radiculopathy. Patient's ADL's increase with prior TAHIR.   The last injection gave greater than 60% reduction of the pain but now there is a return of symptoms. The patient is requesting a subsequent epidural steroid injection to alleviate pain and improve functional ability and quality of life.  Patient is a candidate.\par Objective findings\par Since the last visit, there have been no new imaging studies. THe patient has no new symptoms except the return of the their pain complaints. Motor and sensory function is unchanged.\par Plan\par Spoke to patient about epidural steroid injections. Explained risks, benefits and alternatives including but not limited to the risk of infection, bleeding, headache, syncopal episode, failure to resolve issues, allergic reaction, symptom recurrence, allergic reaction, nerve injury, and increased pain. The patient understands the risks. All questions were answered. The patient is willing to proceed.\par Patient is on anticoagulation therapy and will get clearance from his cardiologist to interrupt the therapy for the injection.

## 2023-06-11 NOTE — ED ADULT NURSE NOTE - NSFALLUNIVINTERV_ED_ALL_ED
Bed/Stretcher in lowest position, wheels locked, appropriate side rails in place/Call bell, personal items and telephone in reach/Instruct patient to call for assistance before getting out of bed/chair/stretcher/Non-slip footwear applied when patient is off stretcher/[place] to call system/Physically safe environment - no spills, clutter or unnecessary equipment/Purposeful proactive rounding/Room/bathroom lighting operational, light cord in reach

## 2023-06-11 NOTE — ED ADULT NURSE NOTE - OBJECTIVE STATEMENT
PT sent by DR Razo for INR check as pt is on coumadin and having a steroidal epidural to the spine tomorrow. As per Dr Razo, Pt to receive lovenox if INR below 2. Pt has no complaints on time of arrival.

## 2023-06-11 NOTE — ED PROVIDER NOTE - OBJECTIVE STATEMENT
88-year-old male with history of CAD, hypertension, A-fib, heart failure reduced ejection fraction presenting to the ED sent in by Dr. Razo for INR check due to to get epidural injection tomorrow.  Patient takes Lovenox 60 mg daily, plan was to check INR less than 2 give Lovenox 60 mg, if greater than 2 patient to be discharged for procedure tomorrow.

## 2023-06-11 NOTE — ED PROVIDER NOTE - PATIENT PORTAL LINK FT
You can access the FollowMyHealth Patient Portal offered by [facility] by registering at the following website: [email or web address]. By joining [employer]’s FollowMyHealth portal, you will also be able to view your health information using other applications (apps) compatible with our system.

## 2023-06-11 NOTE — ED ADULT TRIAGE NOTE - BEFAST EYES
Left neck, arm, torso (side), hip, and thigh pain, intermittent (worse at night) for past 1-2 years.  Unknown cause.   No

## 2023-06-13 NOTE — CHART NOTE - NSCHARTNOTEFT_GEN_A_CORE
Chart reviewed. Patient presented to the ED on 6/11/23 for a medical evaluation for blood work in preparation for his procedure. Patient was advised to follow up with his PMD post d/c. Per to [facility], patient has a scheduled PCP appt with Dr. Clement on 6/16/23.

## 2023-06-14 NOTE — PROCEDURE
[CRT-D] : Cardiac resynchronization therapy defibrillator [VVIR] : VVIR [Longevity: ___ months] : The estimated remaining battery life is [unfilled] months [Threshold Testing Performed] : Threshold testing was performed [Lead Imp:  ___ohms] : lead impedance was [unfilled] ohms [___V @] : [unfilled] V [___ ms] : [unfilled] ms [None] : none [Pace ___ %] : Pace [unfilled]% [de-identified] : bi v paced [de-identified] : st jud[facility] [de-identified] : 4816-28y [de-identified] : 12/11/2018 [de-identified] : [address] [de-identified] : riata lead present working well no evidence of insulation problem\par 1 episode nsvt 4/15/2023\par 12 beats at 155 bpm\par no therapies\par normal function\par \par

## 2023-06-16 PROBLEM — M54.50 BACK PAIN, LUMBOSACRAL: Status: ACTIVE | Noted: 2018-06-15

## 2023-06-16 PROBLEM — J44.9 LUNG DISEASE, OBSTRUCTIVE: Chronic | Status: ACTIVE | Noted: 2021-05-10

## 2023-06-16 NOTE — HISTORY OF PRESENT ILLNESS
[FreeTextEntry1] : hospital discharge f/u [de-identified] :  89 y/o PMH HTN HLD CAD CHF with AICD in place, A fib on Coumadin, Pre-Dm2. Hypothyroidism, CKD decreased GFR, thrombocytopenia Anemia was seen in ED for INR check (2.09 in ED) due to to get epidural injection for severe back pain Present here for f/u and notes is in severe pain and the procedure for epidural injections was unable to get it done due to increased INR and low platelets. Pt states came here to see if can get something else for pain now since nothing seems to help  Is using THC products but no much improvement, Only on th cough in certain position is able to get a little relieve. Pt states pain is severe that cannot even eat and is loosing weight  Percocet does not help.  Pt denies bowel or bladder incontinence. Does have hesitancy but no change in stream and chronic constipation and does take medication for it\par

## 2023-06-16 NOTE — ASSESSMENT
[FreeTextEntry1] : time spent d/w pt medication and trial tramadol prn risks and benefits d/w pt Need to f/u ortho and trial get injection as well d/w pt weight loss and keep active

## 2023-06-16 NOTE — PHYSICAL EXAM
[Well-Appearing] : well-appearing [Normal S1, S2] : normal S1 and S2 [Soft] : abdomen soft [Non Tender] : non-tender [de-identified] : c/o pain severe in side position on wheelchair [de-identified] : pain thoracolumbar portion spine

## 2023-06-16 NOTE — REVIEW OF SYSTEMS
[Fatigue] : fatigue [Recent Change In Weight] : ~T recent weight change [Joint Stiffness] : joint stiffness [Muscle Pain] : muscle pain [Muscle Weakness] : muscle weakness [Back Pain] : back pain [Joint Swelling] : joint swelling [FreeTextEntry9] : severe back pain

## 2023-06-17 NOTE — DISCUSSION/SUMMARY
[Procedure Low Risk] : the procedure risk is low [Patient Intermediate Risk] : the patient is an intermediate risk [Optimized for Surgery Pending Laboratory Results] : the patient is optimized for surgery pending laboratory results [FreeTextEntry1] : warfarin to be held for target INR

## 2023-06-17 NOTE — HISTORY OF PRESENT ILLNESS
[Preoperative Visit] : for a medical evaluation prior to surgery [Scheduled Procedure ___] : a [unfilled] [Date of Surgery ___] : on [unfilled] [Surgeon Name ___] : surgeon: [unfilled] [de-identified] : Lyle [FreeTextEntry1] : Jj was recently hospitalized with a diarrheal type illness he was seen by ID service and was noted to have a markedly abnormal CAT scan. He is felt to be a high risk for epidural and he understands this risk  he is seeing the Dr. Gresham in [place] phone [phone] fax [phone] \par \par Update 7/12/2022\par Jj had transient relief from the procedure he will be seeing Dr. Cleveland on Monday for a surgical opinion a brain MRI showed ischemic changes still has residual pain would reevaluate after surgical opinion.  Jj seems to have tolerated the anticoagulation regimen better\par \par Update 12/2/22\par Jj underwent an echocardiogram recently which demonstrates an inferior wall motion abnormality and concerns were raised regarding progressive corner disease given a prior history of a mesenteric stent and atherosclerotic cardiovascular disease in the setting of some dyspnea\par \par update 12/16/22\par The nuclear stress test is planned in order to evaluate complaints of dyspnea I offered cardiac catheterization to Jj he prefers a noninvasive approach first although we have a low index of suspicion for progressive coronary disease this elderly gentleman with advanced peripheral arterial disease\par \par Update 3/7/2023 \par Jj comes today for review of recent nuclear testing which shows infarction without ischemia however ejection fraction low at 39% \par \par update 4/4/23\par Javon has persistent complaints of shortness of breath and we are concerned about progression of  disease especially in the setting of prior celiac stent\par \par update 6/14/23\par despite multiple attempts to achieve an acceptable Inr the epidural was canceled. Jj remains in severe pain and epidural was planned on Monday however it was aborted due to a platelet count of 101,000 and an INR of 1.5 the epidural was to be rescheduled and concern was raised regarding a platelet count of less than 110,000 the INR target is approximately one

## 2023-06-17 NOTE — PHYSICAL EXAM
[General Appearance - Well Developed] : well developed [Well Groomed] : well groomed [General Appearance - Well Nourished] : well nourished [General Appearance - In No Acute Distress] : no acute distress [Eyelids - No Xanthelasma] : the eyelids demonstrated no xanthelasmas [No Oral Pallor] : no oral pallor [Normal Oral Mucosa] : normal oral mucosa [No Oral Cyanosis] : no oral cyanosis [Normal Jugular Venous A Waves Present] : normal jugular venous A waves present [Normal Jugular Venous V Waves Present] : normal jugular venous V waves present [No Jugular Venous Perez A Waves] : no jugular venous perez A waves [Respiration, Rhythm And Depth] : normal respiratory rhythm and effort [Exaggerated Use Of Accessory Muscles For Inspiration] : no accessory muscle use [Auscultation Breath Sounds / Voice Sounds] : lungs were clear to auscultation bilaterally [Edema] : no peripheral edema present [Abdomen Soft] : soft [Abdomen Tenderness] : non-tender [Abdomen Mass (___ Cm)] : no abdominal mass palpated [Skin Color & Pigmentation] : normal skin color and pigmentation [] : no rash [No Venous Stasis] : no venous stasis [Skin Lesions] : no skin lesions [No Skin Ulcers] : no skin ulcer [No Xanthoma] : no  xanthoma was observed [Oriented To Time, Place, And Person] : oriented to person, place, and time [Affect] : the affect was normal [Mood] : the mood was normal [No Anxiety] : not feeling anxious [Well Developed] : well developed [Well Nourished] : well nourished [No Acute Distress] : no acute distress [Normal Conjunctiva] : normal conjunctiva [Normal Venous Pressure] : normal venous pressure [No Carotid Bruit] : no carotid bruit [Normal S1, S2] : normal S1, S2 [No Murmur] : no murmur [No Rub] : no rub [No Gallop] : no gallop [Clear Lung Fields] : clear lung fields [Good Air Entry] : good air entry [No Respiratory Distress] : no respiratory distress  [Soft] : abdomen soft [Non Tender] : non-tender [No Masses/organomegaly] : no masses/organomegaly [Normal Bowel Sounds] : normal bowel sounds [Normal Gait] : normal gait [No Cyanosis] : no cyanosis [No Clubbing] : no clubbing [No Rash] : no rash [No Varicosities] : no varicosities [No Skin Lesions] : no skin lesions [Moves all extremities] : moves all extremities [No Focal Deficits] : no focal deficits [Normal Speech] : normal speech [Alert and Oriented] : alert and oriented [Normal memory] : normal memory [Left Infraclavicular] : left infraclavicular area [de-identified] : bilateral 3 to 4+ [FreeTextEntry1] : clean protruding wire but no erythema or sings of dermatologic erythema or compromise

## 2023-06-17 NOTE — ASSESSMENT
[FreeTextEntry1] : INR today is 2.2 which is satisfactory. he takes warfarin 2.5 mg 2 days/week and 5 mg all other days. he will see Dr. Gresham in spine management and pain management evaluation in [place].I suspect that the spine is complex and complicated and suspect that the procedure may be increased risk especially while being monitored for anticoagulation. I suggested the following regimen Coumadin would be discontinued five days prior to the planned procedure and INR will be checked daily and when the INR goes below two then enoxaparin 60 mg two times per day will be instituted; the evening prior and the day of surgery enoxaparin would be held in anticipation of surgery .his wife was reluctant to give the dose subcutaneously although this will be evaluated\par \par Update 7/12/2022\par No current change in regimen aside from resumption of anticoagulation and reevaluation after surgical opinion obtained\par \par addendum 12/2/22\par we will proceed with nuclear stress testing in order to assess the abnormalities seen on echocardiography and to exclude progressive corner disease\par \par 12/16/22\par nuclear stress test advised consideration to cardiac catheterization and angiography\par \par 3/7/2023\par Given ejection fraction of 39% would therefore add a ARB agent in addition to beta-blocker spironolactone is discretionary Farxiga is also consideration would watch creatinine carefully on ARB I discussed with Pablo at bedside.  Upcoming device check 3/15/2023 for defibrillator pacer.  I will check renal function prior to instituting losartan 50 mg\par \par \par 4/4/23\par cardiac CTA requested in order to assess coronary anatomy probably unable to tolerate a stress test given severe back pains\par \par 6/14/23\par INR today resume warfarin reschedule epidural will coordinate care with Dr. Hess  who was willing to transfuse with platelets in order to allow for an epidural to proceed\par \par \par medical necessity\par this is a high risk encounter based upon review of a prior echocardiogram and stress testing the recommendation for a cardiac CTA\par

## 2023-06-19 NOTE — ED PROVIDER NOTE - NSTIMEPROVIDERCAREINITIATE_GEN_ER
How Severe Is Your Skin Lesion?: mild
Has Your Skin Lesion Been Treated?: not been treated
Is This A New Presentation, Or A Follow-Up?: Growth
19-Jun-2023 11:36

## 2023-06-19 NOTE — ED ADULT TRIAGE NOTE - CHIEF COMPLAINT QUOTE
Pt. BIBA in cardiac arrest with 7.5 ET tube and compressions being done.  As per EMS family heard pt. fall in kitchen and had 5 mins of downtime prior to EMS arrival with no bystander CPR.  EMS preformed approximately 45 mins of CPR.  Pt. given 5 epi, 1 bicarb and 1 calcium via EMS.  Pt. initially in PEA and went into fine v. fib and shocked twice.  Pt. had approximately 30 seconds of ROSC around 1118am.

## 2023-06-19 NOTE — ED PROVIDER NOTE - PHYSICAL EXAMINATION
General:     Unresponsive  Head:     NC/Pupils round bilaterally Not reactive  Neck:     trachea midline  Lungs:     Bilateral air entry with ventilation  CVS:     No orthopedic  Abd:     +BS, s/nt/nd, no organomegaly  Ext:   No pulses  Neuro: Unresponsive

## 2023-06-19 NOTE — ED PROVIDER NOTE - CLINICAL SUMMARY MEDICAL DECISION MAKING FREE TEXT BOX
Cardiopulmonary arrest ACLS protocol no return of spontaneous circulation patient pronounced dead at 11:33 AM patient's family contacted medical examiner contacted Nina Campbell at 12:10 PM

## 2023-06-19 NOTE — ED PROVIDER NOTE - OBJECTIVE STATEMENT
88-year-old male with a past history of AICD in place CHF coronary artery disease came to the emergency room status postcardiac arrest patient was treated in the field with Epinephrine 3 times calcium chloride 1 amp and bicarb 1 amp patient was in the asystole first there was a return of circulation but later went to PEA and did not respond patient in the emergency room was in PEA and asystole patient was given multiple doses of epinephrine with no response Patient pronounced dead at 11:33 AM

## 2023-06-28 ENCOUNTER — LABORATORY RESULT (OUTPATIENT)
Age: 88
End: 2023-06-28

## 2023-07-16 LAB — GLUCOSE BLDC GLUCOMTR-MCNC: 165 MG/DL — HIGH (ref 70–99)

## 2023-08-09 ENCOUNTER — APPOINTMENT (OUTPATIENT)
Dept: CARDIOLOGY | Facility: CLINIC | Age: 88
End: 2023-08-09

## 2023-09-13 ENCOUNTER — APPOINTMENT (OUTPATIENT)
Dept: CARDIOLOGY | Facility: CLINIC | Age: 88
End: 2023-09-13

## 2023-09-13 ENCOUNTER — APPOINTMENT (OUTPATIENT)
Dept: ELECTROPHYSIOLOGY | Facility: CLINIC | Age: 88
End: 2023-09-13

## 2023-10-09 ENCOUNTER — APPOINTMENT (OUTPATIENT)
Dept: HEMATOLOGY ONCOLOGY | Facility: CLINIC | Age: 88
End: 2023-10-09

## 2023-12-31 PROBLEM — Z23 NEED FOR 23-POLYVALENT PNEUMOCOCCAL POLYSACCHARIDE VACCINE: Status: RESOLVED | Noted: 2017-01-19 | Resolved: 2023-12-31

## 2024-05-15 NOTE — ASSESSMENT
[FreeTextEntry1] : Mr. BROWER and his spouse 's questions were answered to their satisfaction. He  will return to the office in 6 months.\par \par \par \par \par  Yes

## 2024-05-31 NOTE — ED PROVIDER NOTE - CARDIAC RATE
Received Renewal request via [employer]  for insulin lispro (HUMALOG) 100 UNIT/ML INJ . (Quantity:30 ml, Day Supply:30)     Insurance: RX [employer]  Member ID:  [id number]  BIN: [id number]  PCN: [id number]  Group: [id number]     Ran Test claim via [place] & medication Pays for a $35.00 copay. Will outreach to patient to offer specialty pharmacy services and or release to preferred pharmacy    Art Guzmán [facility]   Pharmacy Liaison  [phone]  5/31/2024 6:31 AM         normal

## 2024-11-04 NOTE — DISCHARGE NOTE NURSING/CASE MANAGEMENT/SOCIAL WORK - NURSING SECTION COMPLETE
Caller: Self    Doctor: Brooks    Reason for call: Calling to check if bone scan has been authorized by insurance. Made aware it has been per referral note. Can someone call patient back to confirm test has been authorized    Call back#: [phone]   Patient/Caregiver provided printed discharge information. Nsaids Pregnancy And Lactation Text: These medications are considered safe up to 30 weeks gestation. It is excreted in breast milk.

## 2024-11-15 NOTE — ED ADULT TRIAGE NOTE - BIRTH SEX
Chief Complaint   Patient presents with    Neck Pain     S/s for 2 days.  No OTC meds taken. acute left side of neck pain with movement     HPI:     Neymar Hamilton is a 76 year old male who presents with a chief complaint of left sided neck pain  Onset:  gradual starting 2 days ago.  Cause: unknown. Patient still works as a morgan and pain started after working 2 days in a row  Radiation: without radiation.    Pain is described as aching and throbbing.   Severity of symptoms now is moderate.   Symptoms have been constant  Range of motion of neck: somewhat limited  Pain with movement:  Yes  Pain radiates into arm:  n/a.   Symptoms are aggravated by movement and palpation  Alleviated by rest   Associated symptoms:  none .   Care prior to arrival consisted of nothing, with minimal relief.  History of prior cervical strain/sprain: no      Current Outpatient Medications   Medication Sig Dispense Refill    Naproxen Sodium 550 MG Oral Tab Take 1 tablet (550 mg total) by mouth 2 (two) times daily with meals for 14 days. 28 tablet 0    cyclobenzaprine 5 MG Oral Tab Take 1 tablet (5 mg total) by mouth 3 (three) times daily as needed for Muscle spasms. 15 tablet 0    ferrous sulfate 325 (65 FE) MG Oral Tab EC Take 1 tablet (325 mg total) by mouth daily with breakfast.      Ascorbic Acid (VITAMIN C) 100 MG Oral Tab Take 1 tablet (100 mg total) by mouth daily. (Patient not taking: Reported on 8/14/2023)      omega-3 fatty acids 1000 MG Oral Cap Take 1,000 mg by mouth daily. (Patient not taking: Reported on 8/14/2023)      Vitamin B-12 100 MCG Oral Tab Take 0.5 tablets (50 mcg total) by mouth daily. (Patient not taking: Reported on 8/14/2023)       Past Medical History:    Arthritis    Cancer (HCC)    Low Grade Follicular Lymphoma    Hearing impairment    [place] bilateral hearing aids    Hernia, inguinal    Problems with swallowing    chokes easily-needs to drink/eat slowly    Sciatica of right side    Vertigo    Visual impairment     glasses     Past Surgical History:   Procedure Laterality Date    Appendectomy      Appendectomy      Colonoscopy      Colonoscopy  2014    Hip replacement surgery Right 2017    Other surgical history  10/18/2019    Laparoscopic robotic-assisted mesenteric and retroperitoneal lymph node biopsies    Tonsillectomy  jean elvin. years    childhood    Total hip replacement Right      ROS:   GENERAL HEALTH: feels well otherwise  SKIN: denies any unusual skin lesions or rashes  RESPIRATORY: denies shortness of breath with exertion  CARDIOVASCULAR: denies chest pain on exertion  GI: denies abdominal pain and denies heartburn  NEURO: denies headaches  Musculoskeletal: neck pain as described above.       Physical Exam:     /70   Pulse 78   Temp 100.4 °F (38 °C) (Oral)   Resp 18   Wt 164 lb (74.4 kg)   SpO2 98%   BMI 23.53 kg/m²     General: Well-nourished, well hydrated. No acute distress. No pallor. No tachypnea  HEENT: normocephaly, atraumatic. PERRL bilaterally. Sclera clear and non icteric bilaterally. Normal nasal turbinates. tonsils are clear no exudates bilaterally. Moist mucous membranes. No erythema of the oral pharynx.   Heart: RRR without S3 or S4 murmur . Clear S1S2  Lungs: clear to auscultation bilaterally. No rales, rhonchi or wheezes. Good inspiratory and expiratory effort  Abdomen: soft, nontender, nondistended. NL bowel sounds. No organomegaly.  Extremities: No cyanosis, clubbing, or edema bilaterally . MS +5/5 all u/LE symmetric and bilateral. Radial pulse +2 and strong.  Skin: no rashes  Neuro: AOx3. Sensation intact/ UE DTR +2/4 bilateral and equal and symmetric. Normal gait.  Neck exam:   - Pericervical Tenderness:  Yes   - Trapezius Tenderness:  Yes   - C-spine Tenderness:  no   - Normal Arm Sensation:  no   - Normal  Strength:  Yes   - Lhermitte sign: negative   - Movements: Rotation decreased right and left - 45 degrees                        Forward bending - 20 degrees                         Extension - 10 degrees   - Motor exam : intact  -  Sensations: intact  -  DTRs : 2+  -  Good hand   -  Radial pulses intact   - No cervical lymphadenopathy       Assessment/Plan:     Diagnosis:    ICD-10-CM    1. Cervical strain, acute, initial encounter  S16.1XXA Naproxen Sodium 550 MG Oral Tab     cyclobenzaprine 5 MG Oral Tab          Medications for this encounter:  Encounter Medications[1]    Orders Placed This Encounter    Naproxen Sodium 550 MG Oral Tab     Sig: Take 1 tablet (550 mg total) by mouth 2 (two) times daily with meals for 14 days.     Dispense:  28 tablet     Refill:  0    cyclobenzaprine 5 MG Oral Tab     Sig: Take 1 tablet (5 mg total) by mouth 3 (three) times daily as needed for Muscle spasms.     Dispense:  15 tablet     Refill:  0       Labs performed this visit:  No results found for this or any previous visit (from the past 10 hours).    Plan:  Discharge medications:   OTC NSAIDs and/or Tylenol for pain  Heat/ice to neck  Follow up with pcp for recheck in 1 week. May need PT     All results reviewed and discussed with patient.  See AVS for detailed discharge instructions for your condition today.    Follow Up with:  No follow-up provider specified.           [1]   Outpatient Encounter Medications as of 11/15/2024   Medication Sig Dispense Refill    Naproxen Sodium 550 MG Oral Tab Take 1 tablet (550 mg total) by mouth 2 (two) times daily with meals for 14 days. 28 tablet 0    cyclobenzaprine 5 MG Oral Tab Take 1 tablet (5 mg total) by mouth 3 (three) times daily as needed for Muscle spasms. 15 tablet 0    ferrous sulfate 325 (65 FE) MG Oral Tab EC Take 1 tablet (325 mg total) by mouth daily with breakfast.      Ascorbic Acid (VITAMIN C) 100 MG Oral Tab Take 1 tablet (100 mg total) by mouth daily. (Patient not taking: Reported on 8/14/2023)      omega-3 fatty acids 1000 MG Oral Cap Take 1,000 mg by mouth daily. (Patient not taking: Reported on 8/14/2023)      Vitamin B-12 100 MCG  Oral Tab Take 0.5 tablets (50 mcg total) by mouth daily. (Patient not taking: Reported on 8/14/2023)       No facility-administered encounter medications on file as of 11/15/2024.      Male

## 2025-01-07 NOTE — H&P CARDIOLOGY - BP NONINVASIVE SYSTOLIC (MM HG)
Last office visit date: 8.13.2024  Next appointment scheduled?: No;  follow up in 1 year    Number of refills given: 1    Medication:    carvedilol (COREG) 12.5 MG tablet         Sig: Take 1 tablet by mouth in the morning and 1 tablet in the evening. Take with meals.    Disp: 180 tablet    Refills: 0    Start: 1/5/2025    Class: Eprescribe    For: Coronary artery disease involving native coronary artery of native heart without angina pectoris    To pharmacy: Dose increase    Last ordered: 3 months ago (10/7/2024) by Doug Robledo MD    Beta Blocker Refill Protocol - 12 Month Protocol [id number]/05/2025 09:05 AM   Protocol Details Seen by prescribing provider or same department within the last 12 months or has a future appt in 3 months - IF FAILED PLEASE LOOK AT CHART REVIEW FOR LAST VISIT AND PROCEED ACCORDINGLY    Last recorded pulse is greater than 49    Medication (including dose and sig) on current meds list    Request is NOT for Sotalol HCL    Current med list does not contain more than one Beta Blocker medication    Last BP was equal to or less than 150/100 -- IF CRITERIA FAILED REFER TO PROTOCOL DETAILS      To be filled at: [id number] - [address] AT [facility] [address]       passed protocol.       
Pharmacy faxed request for medication refill.    Preferred pharmacy set up and verified     
[phone]

## 2025-03-21 NOTE — ASSESSMENT
SW/VIVIEN Discharge Plan  Informed patient is ready for discharge.  Patient to be picked up by BLS due to elopement. Patient/interested person has been counseled for post hospitalization care.   . Initial implementation of the patient’s discharge plan has been arranged, including any devices/equipment needed for discharge. Discharge plan communicated to MD, RN, VIVIEN, Receiving Facility/Agency, and dgt Abhishek .    Patient’s discharge destination is long term care .    Selected Continued Care - Admitted Since 3/19/2025       Destination  Coordination complete.      Service Provider Selected Services Address Phone Fax    SKYLAR DUNAWAY [place] [facility] [address], [facility] [address] [phone]                    Pt has been cleared for dc. Dgt notified and in agreement with transfer. Dgt inquired about POA. Writer explained documents cannot be completed as pt is non decisional however Skylar Dunaway [place] sur[place] and Abhishek will remain decision maker as established by . VIVIEN also spoke with PAUL Crow who will complete screen within the hour. 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  Have explained that bone marrow processes may evolve over time.  Potential complications (for example, leukemic transformation) if MDS discussed, as well as treatment options pending course.  At this time, no plans for treatment for his cytopenias.  Patient is agreeable to interval follow-up.  Should hematologic scenario worsen/change, then further evaluation with follow-up BMB would be warranted should he wish to pursue this.\par Patient to begin p.o. vitamin B12 supplement for vitamin B-12 level less than 400, and will begin p.o. folic acid (slightly elevated homocysteine level).\par Patient will follow-up with PCP for ongoing thyroid management.\par Patient and his wife were given the opportunity to ask questions.  Their questions have been answered to their apparent satisfaction at this time.